# Patient Record
Sex: FEMALE | Race: WHITE | NOT HISPANIC OR LATINO | Employment: UNEMPLOYED | URBAN - METROPOLITAN AREA
[De-identification: names, ages, dates, MRNs, and addresses within clinical notes are randomized per-mention and may not be internally consistent; named-entity substitution may affect disease eponyms.]

---

## 2018-03-21 ENCOUNTER — OFFICE VISIT (OUTPATIENT)
Dept: URGENT CARE | Facility: CLINIC | Age: 28
End: 2018-03-21

## 2018-03-21 ENCOUNTER — HOSPITAL ENCOUNTER (OUTPATIENT)
Facility: MEDICAL CENTER | Age: 28
End: 2018-03-21
Attending: PHYSICIAN ASSISTANT

## 2018-03-21 VITALS
DIASTOLIC BLOOD PRESSURE: 62 MMHG | HEIGHT: 65 IN | TEMPERATURE: 97.3 F | RESPIRATION RATE: 12 BRPM | OXYGEN SATURATION: 97 % | BODY MASS INDEX: 20.68 KG/M2 | HEART RATE: 64 BPM | WEIGHT: 124.12 LBS | SYSTOLIC BLOOD PRESSURE: 110 MMHG

## 2018-03-21 DIAGNOSIS — N30.01 ACUTE CYSTITIS WITH HEMATURIA: ICD-10-CM

## 2018-03-21 LAB
APPEARANCE UR: CLEAR
BILIRUB UR STRIP-MCNC: NEGATIVE MG/DL
COLOR UR AUTO: NORMAL
GLUCOSE UR STRIP.AUTO-MCNC: NEGATIVE MG/DL
KETONES UR STRIP.AUTO-MCNC: NEGATIVE MG/DL
LEUKOCYTE ESTERASE UR QL STRIP.AUTO: NEGATIVE
NITRITE UR QL STRIP.AUTO: NEGATIVE
PH UR STRIP.AUTO: 8 [PH] (ref 5–8)
PROT UR QL STRIP: NEGATIVE MG/DL
RBC UR QL AUTO: NORMAL
SP GR UR STRIP.AUTO: 1
UROBILINOGEN UR STRIP-MCNC: 0.2 MG/DL

## 2018-03-21 PROCEDURE — 99000 SPECIMEN HANDLING OFFICE-LAB: CPT | Performed by: PHYSICIAN ASSISTANT

## 2018-03-21 PROCEDURE — 81002 URINALYSIS NONAUTO W/O SCOPE: CPT | Performed by: PHYSICIAN ASSISTANT

## 2018-03-21 PROCEDURE — 87086 URINE CULTURE/COLONY COUNT: CPT

## 2018-03-21 PROCEDURE — 99202 OFFICE O/P NEW SF 15 MIN: CPT | Performed by: PHYSICIAN ASSISTANT

## 2018-03-21 RX ORDER — NITROFURANTOIN 25; 75 MG/1; MG/1
100 CAPSULE ORAL EVERY 12 HOURS
Qty: 14 CAP | Refills: 0 | Status: SHIPPED | OUTPATIENT
Start: 2018-03-21 | End: 2018-03-28

## 2018-03-21 ASSESSMENT — ENCOUNTER SYMPTOMS
ABDOMINAL PAIN: 1
MUSCULOSKELETAL NEGATIVE: 1
FLANK PAIN: 0
CONSTITUTIONAL NEGATIVE: 1

## 2018-03-22 DIAGNOSIS — N30.01 ACUTE CYSTITIS WITH HEMATURIA: ICD-10-CM

## 2018-03-22 NOTE — PROGRESS NOTES
"Subjective:      Mary Carmen Head is a 27 y.o. female who presents with Dysuria (x1week, frequent urination, difficult to urinate, burns to urinate, does not want cipro)            Dysuria    This is a new problem. The current episode started yesterday. The problem occurs every urination. The problem has been unchanged. The quality of the pain is described as burning. The pain is moderate. There has been no fever. Associated symptoms include frequency and urgency. Pertinent negatives include no discharge, flank pain or hematuria. She has tried nothing for the symptoms. The treatment provided no relief. There is no history of catheterization, kidney stones, recurrent UTIs, a single kidney, urinary stasis or a urological procedure.       Review of Systems   Constitutional: Negative.    Gastrointestinal: Positive for abdominal pain.   Genitourinary: Positive for dysuria, frequency and urgency. Negative for flank pain and hematuria.   Musculoskeletal: Negative.    Skin: Negative.           Objective:     /62   Pulse 64   Temp 36.3 °C (97.3 °F)   Resp 12   Ht 1.651 m (5' 5\")   Wt 56.3 kg (124 lb 1.9 oz)   SpO2 97%   BMI 20.65 kg/m²      Physical Exam   Constitutional: She is oriented to person, place, and time. She appears well-developed and well-nourished. No distress.   Abdominal: She exhibits no distension. There is no tenderness.    No flank or CVAT             Neurological: She is alert and oriented to person, place, and time.   Skin: Skin is warm and dry.   Psychiatric: She has a normal mood and affect. Her behavior is normal.   Nursing note and vitals reviewed.    Active Ambulatory Problems     Diagnosis Date Noted   • No Active Ambulatory Problems     Resolved Ambulatory Problems     Diagnosis Date Noted   • No Resolved Ambulatory Problems     No Additional Past Medical History     Current Outpatient Prescriptions on File Prior to Visit   Medication Sig Dispense Refill   • " promethazine-codeine (PHENERGAN-CODEINE) 6.25-10 MG/5ML SYRP Take 5-10 mL by mouth 4 times a day as needed for Cough. 150 mL 0     No current facility-administered medications on file prior to visit.      Social History     Social History   • Marital status: Single     Spouse name: N/A   • Number of children: N/A   • Years of education: N/A     Occupational History   • Not on file.     Social History Main Topics   • Smoking status: Current Every Day Smoker   • Smokeless tobacco: Never Used   • Alcohol use Not on file   • Drug use: Yes     Types: Marijuana   • Sexual activity: Not on file     Other Topics Concern   • Not on file     Social History Narrative   • No narrative on file     History reviewed. No pertinent family history.  Claritin         ua ng     Assessment/Plan:     ·  uti [consider IC?]      · rx abx, cx

## 2018-03-25 ENCOUNTER — TELEPHONE (OUTPATIENT)
Dept: URGENT CARE | Facility: CLINIC | Age: 28
End: 2018-03-25

## 2018-03-25 LAB
BACTERIA UR CULT: NORMAL
SIGNIFICANT IND 70042: NORMAL
SITE SITE: NORMAL
SOURCE SOURCE: NORMAL

## 2018-04-03 ENCOUNTER — TELEPHONE (OUTPATIENT)
Dept: URGENT CARE | Facility: CLINIC | Age: 28
End: 2018-04-03

## 2018-04-03 NOTE — TELEPHONE ENCOUNTER
1. Caller Name: Mireille                                         Call Back Number: [phone] (home)       Patient approves a detailed voicemail message: yes    2. Patient is requesting lab results dated: 3/25/18.  Per pt, states she left a couple of messages and never received a call back from Tj.    3. Confirmed results are in chart. Patient advised they will be contacted once interpreted by provider.

## 2018-04-04 ENCOUNTER — TELEPHONE (OUTPATIENT)
Dept: URGENT CARE | Facility: CLINIC | Age: 28
End: 2018-04-04

## 2018-04-04 NOTE — TELEPHONE ENCOUNTER
Called pt back [this time did not get 'not in service' ans.but left msg of UA results ; told to call back if still having sx or follow up PCP. rw

## 2018-04-04 NOTE — TELEPHONE ENCOUNTER
Talked to pt; still having urinary urg [but almost sounds more like polyuria?; recommend PCP f/u [can r/o diab.vs ADH /diab.insip.sx?]; rw

## 2018-05-23 ENCOUNTER — HOSPITAL ENCOUNTER (OUTPATIENT)
Dept: LAB | Facility: MEDICAL CENTER | Age: 28
End: 2018-05-23
Attending: NURSE PRACTITIONER
Payer: COMMERCIAL

## 2018-05-23 ENCOUNTER — HOSPITAL ENCOUNTER (OUTPATIENT)
Facility: MEDICAL CENTER | Age: 28
End: 2018-05-23
Attending: NURSE PRACTITIONER
Payer: COMMERCIAL

## 2018-05-23 ENCOUNTER — OFFICE VISIT (OUTPATIENT)
Dept: MEDICAL GROUP | Facility: MEDICAL CENTER | Age: 28
End: 2018-05-23
Payer: COMMERCIAL

## 2018-05-23 VITALS
DIASTOLIC BLOOD PRESSURE: 70 MMHG | HEIGHT: 65 IN | BODY MASS INDEX: 20.66 KG/M2 | OXYGEN SATURATION: 95 % | TEMPERATURE: 98 F | WEIGHT: 124 LBS | RESPIRATION RATE: 16 BRPM | HEART RATE: 68 BPM | SYSTOLIC BLOOD PRESSURE: 122 MMHG

## 2018-05-23 DIAGNOSIS — Z00.00 ANNUAL PHYSICAL EXAM: ICD-10-CM

## 2018-05-23 DIAGNOSIS — R35.89 POLYURIA: ICD-10-CM

## 2018-05-23 DIAGNOSIS — R30.0 DYSURIA: ICD-10-CM

## 2018-05-23 DIAGNOSIS — N30.01 ACUTE CYSTITIS WITH HEMATURIA: ICD-10-CM

## 2018-05-23 DIAGNOSIS — K02.9 TOOTH DECAY: ICD-10-CM

## 2018-05-23 DIAGNOSIS — R63.1 POLYDIPSIA: ICD-10-CM

## 2018-05-23 DIAGNOSIS — H04.123 BILATERAL DRY EYES: ICD-10-CM

## 2018-05-23 PROBLEM — R68.2 DRY MOUTH: Status: ACTIVE | Noted: 2018-05-23

## 2018-05-23 LAB
25(OH)D3 SERPL-MCNC: 26 NG/ML (ref 30–100)
ALBUMIN SERPL BCP-MCNC: 4.6 G/DL (ref 3.2–4.9)
ALBUMIN/GLOB SERPL: 1.4 G/DL
ALP SERPL-CCNC: 44 U/L (ref 30–99)
ALT SERPL-CCNC: 14 U/L (ref 2–50)
ANION GAP SERPL CALC-SCNC: 7 MMOL/L (ref 0–11.9)
APPEARANCE UR: NORMAL
AST SERPL-CCNC: 15 U/L (ref 12–45)
BASOPHILS # BLD AUTO: 1.6 % (ref 0–1.8)
BASOPHILS # BLD: 0.09 K/UL (ref 0–0.12)
BILIRUB SERPL-MCNC: 0.7 MG/DL (ref 0.1–1.5)
BILIRUB UR STRIP-MCNC: NEGATIVE MG/DL
BUN SERPL-MCNC: 6 MG/DL (ref 8–22)
CALCIUM SERPL-MCNC: 9.6 MG/DL (ref 8.5–10.5)
CHLORIDE SERPL-SCNC: 103 MMOL/L (ref 96–112)
CHOLEST SERPL-MCNC: 130 MG/DL (ref 100–199)
CO2 SERPL-SCNC: 30 MMOL/L (ref 20–33)
COLOR UR AUTO: NORMAL
CREAT SERPL-MCNC: 0.61 MG/DL (ref 0.5–1.4)
CREAT UR-MCNC: 22 MG/DL
EOSINOPHIL # BLD AUTO: 0.19 K/UL (ref 0–0.51)
EOSINOPHIL NFR BLD: 3.3 % (ref 0–6.9)
ERYTHROCYTE [DISTWIDTH] IN BLOOD BY AUTOMATED COUNT: 38.6 FL (ref 35.9–50)
GLOBULIN SER CALC-MCNC: 3.2 G/DL (ref 1.9–3.5)
GLUCOSE BLD-MCNC: 86 MG/DL (ref 70–100)
GLUCOSE SERPL-MCNC: 84 MG/DL (ref 65–99)
GLUCOSE UR STRIP.AUTO-MCNC: NEGATIVE MG/DL
HBA1C MFR BLD: 5.2 % (ref ?–5.8)
HCT VFR BLD AUTO: 41.4 % (ref 37–47)
HDLC SERPL-MCNC: 58 MG/DL
HGB BLD-MCNC: 14.2 G/DL (ref 12–16)
IMM GRANULOCYTES # BLD AUTO: 0.01 K/UL (ref 0–0.11)
IMM GRANULOCYTES NFR BLD AUTO: 0.2 % (ref 0–0.9)
INT CON NEG: NEGATIVE
INT CON POS: POSITIVE
KETONES UR STRIP.AUTO-MCNC: NEGATIVE MG/DL
LDLC SERPL CALC-MCNC: 61 MG/DL
LEUKOCYTE ESTERASE UR QL STRIP.AUTO: NORMAL
LYMPHOCYTES # BLD AUTO: 1.95 K/UL (ref 1–4.8)
LYMPHOCYTES NFR BLD: 33.7 % (ref 22–41)
MCH RBC QN AUTO: 30.5 PG (ref 27–33)
MCHC RBC AUTO-ENTMCNC: 34.3 G/DL (ref 33.6–35)
MCV RBC AUTO: 89 FL (ref 81.4–97.8)
MICROALBUMIN UR-MCNC: 64.8 MG/DL
MICROALBUMIN/CREAT UR: 2945 MG/G (ref 0–30)
MONOCYTES # BLD AUTO: 0.36 K/UL (ref 0–0.85)
MONOCYTES NFR BLD AUTO: 6.2 % (ref 0–13.4)
NEUTROPHILS # BLD AUTO: 3.18 K/UL (ref 2–7.15)
NEUTROPHILS NFR BLD: 55 % (ref 44–72)
NITRITE UR QL STRIP.AUTO: POSITIVE
NRBC # BLD AUTO: 0 K/UL
NRBC BLD-RTO: 0 /100 WBC
OSMOLALITY UR: 61 MOSM/KG H2O (ref 300–900)
PH UR STRIP.AUTO: 7 [PH] (ref 5–8)
PLATELET # BLD AUTO: 226 K/UL (ref 164–446)
PMV BLD AUTO: 10.5 FL (ref 9–12.9)
POTASSIUM SERPL-SCNC: 3.6 MMOL/L (ref 3.6–5.5)
PROT SERPL-MCNC: 7.8 G/DL (ref 6–8.2)
PROT UR QL STRIP: 100 MG/DL
RBC # BLD AUTO: 4.65 M/UL (ref 4.2–5.4)
RBC UR QL AUTO: NORMAL
SODIUM SERPL-SCNC: 140 MMOL/L (ref 135–145)
SP GR UR STRIP.AUTO: 1
TRIGL SERPL-MCNC: 56 MG/DL (ref 0–149)
TSH SERPL DL<=0.005 MIU/L-ACNC: 2.08 UIU/ML (ref 0.38–5.33)
UROBILINOGEN UR STRIP-MCNC: NEGATIVE MG/DL
WBC # BLD AUTO: 5.8 K/UL (ref 4.8–10.8)

## 2018-05-23 PROCEDURE — 36415 COLL VENOUS BLD VENIPUNCTURE: CPT

## 2018-05-23 PROCEDURE — 83036 HEMOGLOBIN GLYCOSYLATED A1C: CPT | Performed by: NURSE PRACTITIONER

## 2018-05-23 PROCEDURE — 83935 ASSAY OF URINE OSMOLALITY: CPT

## 2018-05-23 PROCEDURE — 99214 OFFICE O/P EST MOD 30 MIN: CPT | Performed by: NURSE PRACTITIONER

## 2018-05-23 PROCEDURE — 85025 COMPLETE CBC W/AUTO DIFF WBC: CPT

## 2018-05-23 PROCEDURE — 82043 UR ALBUMIN QUANTITATIVE: CPT

## 2018-05-23 PROCEDURE — 84443 ASSAY THYROID STIM HORMONE: CPT

## 2018-05-23 PROCEDURE — 87086 URINE CULTURE/COLONY COUNT: CPT

## 2018-05-23 PROCEDURE — 87086 URINE CULTURE/COLONY COUNT: CPT | Mod: 91

## 2018-05-23 PROCEDURE — 81002 URINALYSIS NONAUTO W/O SCOPE: CPT | Performed by: NURSE PRACTITIONER

## 2018-05-23 PROCEDURE — 82306 VITAMIN D 25 HYDROXY: CPT

## 2018-05-23 PROCEDURE — 80061 LIPID PANEL: CPT

## 2018-05-23 PROCEDURE — 80053 COMPREHEN METABOLIC PANEL: CPT

## 2018-05-23 PROCEDURE — 82570 ASSAY OF URINE CREATININE: CPT

## 2018-05-23 RX ORDER — SULFAMETHOXAZOLE AND TRIMETHOPRIM 800; 160 MG/1; MG/1
1 TABLET ORAL 2 TIMES DAILY
Qty: 6 TAB | Refills: 0 | Status: SHIPPED | OUTPATIENT
Start: 2018-05-23 | End: 2018-05-23

## 2018-05-23 RX ORDER — SULFAMETHOXAZOLE AND TRIMETHOPRIM 800; 160 MG/1; MG/1
1 TABLET ORAL 2 TIMES DAILY
Qty: 6 TAB | Refills: 0 | Status: SHIPPED | OUTPATIENT
Start: 2018-05-23 | End: 2018-05-26

## 2018-05-23 ASSESSMENT — PATIENT HEALTH QUESTIONNAIRE - PHQ9
5. POOR APPETITE OR OVEREATING: 3 - NEARLY EVERY DAY
CLINICAL INTERPRETATION OF PHQ2 SCORE: 2

## 2018-05-23 NOTE — PROGRESS NOTES
Mary Carmen Head is a 28 y.o. female here to establish care and discuss the following concerns:    HPI:      Polyuria  Doesn't matter if she is drinking moderate amounts of water or no water, she is having to urinate frequently all day.     Tooth decay  Due to dry mouth, diagnosed by dentist.  Patient not using mouth moisturizers, patient using fluids to moisten mouth.    Polydipsia  Patient complains of significant dry mouth as well as dry eyes.  She was told by her dentist that she has rapid and persistent tooth decay due to dry mouth.  She has been drinking approximately 64 ounces of water per day, 64 ounces of tea per day, and between 8 and 16 ounces of cranberry juice per day.  She is not currently on any medications.    Bilateral dry eyes  Patient began having eye irritation and what she thought was an eye infection in January.  She was seen for this and prescribed lubricating eyedrops and glasses at [facility].  She states the eyedrops are helping with the dry eye.    Acute cystitis with hematuria  Patient has been having persistent dysuria, foul-smelling urine, urine color changes (green per patient) and lower abdominal pain since March.  Patient was prescribed Macrobid via urgent care, POC urinalysis that day was negative.  Patient was also prescribed ciprofloxacin via [facility] which also did not improve symptoms.  Patient has been drinking excessive amounts of water as  this helps with the dysuria.  If patient is not drinking moderate amounts of water and her bladder discomfort worsens.    Current medicines (including changes today)  Current Outpatient Prescriptions   Medication Sig Dispense Refill   • sulfamethoxazole-trimethoprim (BACTRIM DS) 800-160 MG tablet Take 1 Tab by mouth 2 times a day for 3 days. 6 Tab 0   • CRANBERRY PO Take  by mouth.     • Probiotic Product (PROBIOTIC PO) Take  by mouth.       No current facility-administered medications for this visit.      She  has no past medical  "history on file.  She  has no past surgical history on file.  Social History   Substance Use Topics   • Smoking status: Former Smoker   • Smokeless tobacco: Never Used   • Alcohol use No     Social History     Social History Narrative   • No narrative on file     No family history on file.  No family status information on file.         ROS  No chest pain, no abdominal pain, no rash.  Positive ROS as per HPI.  All other systems reviewed and are negative      Objective:     Blood pressure 122/70, pulse 68, temperature 36.7 °C (98 °F), resp. rate 16, height 1.651 m (5' 5\"), weight 56.2 kg (124 lb), last menstrual period 05/20/2018, SpO2 95 %, not currently breastfeeding. Body mass index is 20.63 kg/m².     Physical Exam:    Constitutional: Alert, no distress.  Skin: Warm, dry, good turgor, no rashes in visible areas.  Eye: Equal, round and reactive, conjunctiva clear, lids normal.  ENMT: Lips without lesions, good dentition, oropharynx clear.  Neck: Trachea midline, no masses, no thyromegaly. No cervical or supraclavicular lymphadenopathy.  Respiratory: Unlabored respiratory effort, lungs clear to auscultation, no wheezes, no ronchi.  Cardiovascular: Normal S1, S2, no murmur, no edema.  Abdomen: Soft, non-tender, no masses, no hepatosplenomegaly. + CVA tenderness  Psych: Alert and oriented x3, normal affect and mood.        Assessment and Plan:   The following treatment plan was discussed    1. Annual physical exam  Check labs, follow for annual.  - CBC WITH DIFFERENTIAL; Future  - COMP METABOLIC PANEL; Future  - MICROALBUMIN CREAT RATIO URINE; Future  - TSH WITH REFLEX TO FT4; Future  - VITAMIN D,25 HYDROXY; Future  - LIPID PROFILE; Future    2. Polyuria  Unstable.  Likely due to UTI and excessive liquid intake.  Check labs to rule out diabetes insipidus or kidney dysfunction.  - MICROALBUMIN CREAT RATIO URINE; Future  - VASOPRESSIN(ADH); Future  - OSMOLALITY URINE; Future    3. Polydipsia  Unstable.  POC glucose was " 86.  A1c 5.2.  Check labs to rule out diabetes insipidus or kidney dysfunction.  - MICROALBUMIN CREAT RATIO URINE; Future  - VASOPRESSIN(ADH); Future  - OSMOLALITY URINE; Future  - POCT Glucose  - POCT Hemoglobin A1C    4. Dysuria  Unstable.  Urine positive for large leuks and positive for nitrates.  Also positive for blood the patient is currently on her period.  Urine sent for culture.  Patient not responsive to Macrobid or ciprofloxacin.  Begin Bactrim -160 mg twice daily for 3 days.  - URINE CULTURE(NEW); Future  - sulfamethoxazole-trimethoprim (BACTRIM DS) 800-160 MG tablet; Take 1 Tab by mouth 2 times a day for 3 days.  Dispense: 6 Tab; Refill: 0    5. Acute cystitis with hematuria  Unstable, as above.  - URINE CULTURE(NEW); Future  - sulfamethoxazole-trimethoprim (BACTRIM DS) 800-160 MG tablet; Take 1 Tab by mouth 2 times a day for 3 days.  Dispense: 6 Tab; Refill: 0    6. Tooth decay  Unstable.  Continue follow-up with dentist.  Evaluated for diabetes insipidus.  We will do further evaluation for causes of dry mouth induced tooth decay at follow-up appointment.    7. Bilateral dry eyes  Unstable.  Continue lubricating eyedrops.  Check labs, follow-up on dry eyes.      Records requested.  Followup: Return in about 3 weeks (around 6/13/2018) for UTI, dry mouth/dry eyes, polyuria/polydipsia, labs.    I have placed the below orders and discussed them with an approved delegating provider. The MA is performing the below orders under the direction of Dr. Villalobos

## 2018-05-23 NOTE — ASSESSMENT & PLAN NOTE
Due to dry mouth, diagnosed by dentist.  Patient not using mouth moisturizers, patient using fluids to moisten mouth.

## 2018-05-23 NOTE — ASSESSMENT & PLAN NOTE
Patient complains of significant dry mouth as well as dry eyes.  She was told by her dentist that she has rapid and persistent tooth decay due to dry mouth.  She has been drinking approximately 64 ounces of water per day, 64 ounces of tea per day, and between 8 and 16 ounces of cranberry juice per day.  She is not currently on any medications.

## 2018-05-23 NOTE — ASSESSMENT & PLAN NOTE
Patient has been having persistent dysuria, foul-smelling urine, urine color changes (green per patient) and lower abdominal pain since March.  Patient was prescribed Macrobid via urgent care, POC urinalysis that day was negative.  Patient was also prescribed ciprofloxacin via [facility] which also did not improve symptoms.  Patient has been drinking excessive amounts of water as  this helps with the dysuria.  If patient is not drinking moderate amounts of water and her bladder discomfort worsens.

## 2018-05-23 NOTE — ASSESSMENT & PLAN NOTE
Patient began having eye irritation and what she thought was an eye infection in January.  She was seen for this and prescribed lubricating eyedrops and glasses at [facility].  She states the eyedrops are helping with the dry eye.

## 2018-05-23 NOTE — ASSESSMENT & PLAN NOTE
Doesn't matter if she is drinking moderate amounts of water or no water, she is having to urinate frequently all day.

## 2018-05-24 ENCOUNTER — HOSPITAL ENCOUNTER (OUTPATIENT)
Dept: LAB | Facility: MEDICAL CENTER | Age: 28
End: 2018-05-24
Attending: NURSE PRACTITIONER
Payer: COMMERCIAL

## 2018-05-24 ENCOUNTER — TELEPHONE (OUTPATIENT)
Dept: MEDICAL GROUP | Facility: MEDICAL CENTER | Age: 28
End: 2018-05-24

## 2018-05-24 DIAGNOSIS — H04.129 DRY EYE: ICD-10-CM

## 2018-05-24 DIAGNOSIS — R35.89 POLYURIA: ICD-10-CM

## 2018-05-24 DIAGNOSIS — R68.2 DRY MOUTH: ICD-10-CM

## 2018-05-24 DIAGNOSIS — R80.9 MICROALBUMINURIA: ICD-10-CM

## 2018-05-24 DIAGNOSIS — R63.1 POLYDIPSIA: ICD-10-CM

## 2018-05-24 LAB
CRP SERPL HS-MCNC: 0.2 MG/L (ref 0–7.5)
ERYTHROCYTE [SEDIMENTATION RATE] IN BLOOD BY WESTERGREN METHOD: 5 MM/HOUR (ref 0–20)
OSMOLALITY SERPL: 289 MOSM/KG H2O (ref 278–298)

## 2018-05-24 PROCEDURE — 86225 DNA ANTIBODY NATIVE: CPT

## 2018-05-24 PROCEDURE — 86141 C-REACTIVE PROTEIN HS: CPT

## 2018-05-24 PROCEDURE — 86235 NUCLEAR ANTIGEN ANTIBODY: CPT | Mod: 91

## 2018-05-24 PROCEDURE — 84588 ASSAY OF VASOPRESSIN: CPT

## 2018-05-24 PROCEDURE — 83930 ASSAY OF BLOOD OSMOLALITY: CPT

## 2018-05-24 PROCEDURE — 85652 RBC SED RATE AUTOMATED: CPT

## 2018-05-24 PROCEDURE — 36415 COLL VENOUS BLD VENIPUNCTURE: CPT

## 2018-05-24 PROCEDURE — 86038 ANTINUCLEAR ANTIBODIES: CPT

## 2018-05-24 NOTE — TELEPHONE ENCOUNTER
Please notify patient that her preliminary lab work came back abnormal and possibly concerning for diabetes insipidus.  I have ordered additional lab work but I would like her to have done as soon as possible.  She can go to any [facility] and they will have the orders in their system.  Also please schedule patient for a follow-up appointment with me as soon as possible in the next few weeks.    ADEBAYO David.

## 2018-05-25 LAB
BACTERIA UR CULT: NORMAL
BACTERIA UR CULT: NORMAL
SIGNIFICANT IND 70042: NORMAL
SIGNIFICANT IND 70042: NORMAL
SITE SITE: NORMAL
SITE SITE: NORMAL
SOURCE SOURCE: NORMAL
SOURCE SOURCE: NORMAL

## 2018-05-26 ENCOUNTER — PATIENT MESSAGE (OUTPATIENT)
Dept: MEDICAL GROUP | Facility: MEDICAL CENTER | Age: 28
End: 2018-05-26

## 2018-05-26 DIAGNOSIS — E23.2 DIABETES INSIPIDUS (HCC): ICD-10-CM

## 2018-05-26 LAB — NUCLEAR IGG SER QL IA: NORMAL

## 2018-05-27 LAB
SSA52 R0ENA AB IGG Q0420: 6 AU/ML (ref 0–40)
SSA60 R0ENA AB IGG Q0419: 0 AU/ML (ref 0–40)

## 2018-05-30 ENCOUNTER — PATIENT MESSAGE (OUTPATIENT)
Dept: MEDICAL GROUP | Facility: MEDICAL CENTER | Age: 28
End: 2018-05-30

## 2018-05-30 DIAGNOSIS — E23.2 DIABETES INSIPIDUS (HCC): ICD-10-CM

## 2018-05-31 ENCOUNTER — HOSPITAL ENCOUNTER (OUTPATIENT)
Dept: LAB | Facility: MEDICAL CENTER | Age: 28
End: 2018-05-31
Attending: FAMILY MEDICINE
Payer: COMMERCIAL

## 2018-05-31 DIAGNOSIS — E23.2 DIABETES INSIPIDUS (HCC): ICD-10-CM

## 2018-05-31 LAB — MISCELLANEOUS LAB RESULT MISCLAB: NORMAL

## 2018-05-31 PROCEDURE — 36415 COLL VENOUS BLD VENIPUNCTURE: CPT

## 2018-05-31 PROCEDURE — 84588 ASSAY OF VASOPRESSIN: CPT

## 2018-06-01 ENCOUNTER — PATIENT MESSAGE (OUTPATIENT)
Dept: MEDICAL GROUP | Facility: MEDICAL CENTER | Age: 28
End: 2018-06-01

## 2018-06-01 DIAGNOSIS — E23.2 DIABETES INSIPIDUS (HCC): ICD-10-CM

## 2018-06-02 ENCOUNTER — HOSPITAL ENCOUNTER (OUTPATIENT)
Facility: MEDICAL CENTER | Age: 28
End: 2018-06-02
Attending: FAMILY MEDICINE
Payer: COMMERCIAL

## 2018-06-02 PROCEDURE — 84156 ASSAY OF PROTEIN URINE: CPT

## 2018-06-02 PROCEDURE — 81050 URINALYSIS VOLUME MEASURE: CPT

## 2018-06-04 ENCOUNTER — HOSPITAL ENCOUNTER (OUTPATIENT)
Dept: LAB | Facility: MEDICAL CENTER | Age: 28
End: 2018-06-04
Attending: FAMILY MEDICINE
Payer: COMMERCIAL

## 2018-06-04 DIAGNOSIS — E23.2 DIABETES INSIPIDUS (HCC): ICD-10-CM

## 2018-06-04 LAB
ANION GAP SERPL CALC-SCNC: 9 MMOL/L (ref 0–11.9)
BUN SERPL-MCNC: 6 MG/DL (ref 8–22)
CALCIUM SERPL-MCNC: 9.6 MG/DL (ref 8.5–10.5)
CHLORIDE SERPL-SCNC: 104 MMOL/L (ref 96–112)
CO2 SERPL-SCNC: 29 MMOL/L (ref 20–33)
CREAT SERPL-MCNC: 0.63 MG/DL (ref 0.5–1.4)
GLUCOSE SERPL-MCNC: 84 MG/DL (ref 65–99)
OSMOLALITY SERPL: 296 MOSM/KG H2O (ref 278–298)
OSMOLALITY UR: 216 MOSM/KG H2O (ref 300–900)
POTASSIUM SERPL-SCNC: 3.4 MMOL/L (ref 3.6–5.5)
PROT 24H UR-MCNC: NORMAL MG/24 HR (ref 30–150)
PROT 24H UR-MRATE: <4 MG/DL (ref 0–15)
SODIUM SERPL-SCNC: 142 MMOL/L (ref 135–145)
SPECIMEN VOL UR: 2900 ML

## 2018-06-04 PROCEDURE — 36415 COLL VENOUS BLD VENIPUNCTURE: CPT

## 2018-06-04 PROCEDURE — 83935 ASSAY OF URINE OSMOLALITY: CPT

## 2018-06-04 PROCEDURE — 80048 BASIC METABOLIC PNL TOTAL CA: CPT

## 2018-06-04 PROCEDURE — 84300 ASSAY OF URINE SODIUM: CPT

## 2018-06-04 PROCEDURE — 83930 ASSAY OF BLOOD OSMOLALITY: CPT

## 2018-06-04 PROCEDURE — 82570 ASSAY OF URINE CREATININE: CPT

## 2018-06-05 LAB
CREAT UR-MCNC: 45.9 MG/DL
MISCELLANEOUS LAB RESULT MISCLAB: NORMAL
SODIUM UR-SCNC: 10 MMOL/L

## 2018-06-08 ENCOUNTER — TELEPHONE (OUTPATIENT)
Dept: MEDICAL GROUP | Facility: MEDICAL CENTER | Age: 28
End: 2018-06-08

## 2018-06-08 DIAGNOSIS — E23.2 DIABETES INSIPIDUS (HCC): ICD-10-CM

## 2018-06-08 NOTE — TELEPHONE ENCOUNTER
Urgent referral placed to endocrinology for new diagnosis of diabetes insipidus.  Collaborating physician, Dr. Villalobos, has spoken with endocrinology, Dr. Rhodes, and agrees that patient will need treatment for this.  Outpatient treatment possible with infusion center.  Message sent to patient regarding this referral, however patient states that her symptoms are worsening and she may not feel that she can wait a few more days for treatment to begin.  Advised patient to go to the emergency room if she feels she cannot wait for her appointment for more urgent treatment.    ADEBAYO David.

## 2018-06-09 ENCOUNTER — HOSPITAL ENCOUNTER (EMERGENCY)
Facility: MEDICAL CENTER | Age: 28
End: 2018-06-09
Attending: EMERGENCY MEDICINE
Payer: COMMERCIAL

## 2018-06-09 VITALS
SYSTOLIC BLOOD PRESSURE: 101 MMHG | HEIGHT: 65 IN | WEIGHT: 125 LBS | DIASTOLIC BLOOD PRESSURE: 66 MMHG | HEART RATE: 61 BPM | OXYGEN SATURATION: 100 % | TEMPERATURE: 98.6 F | RESPIRATION RATE: 18 BRPM | BODY MASS INDEX: 20.83 KG/M2

## 2018-06-09 DIAGNOSIS — R39.15 URINARY URGENCY: ICD-10-CM

## 2018-06-09 DIAGNOSIS — R10.9 ABDOMINAL PAIN, UNSPECIFIED ABDOMINAL LOCATION: ICD-10-CM

## 2018-06-09 LAB
ALBUMIN SERPL BCP-MCNC: 4.5 G/DL (ref 3.2–4.9)
ALBUMIN/GLOB SERPL: 1.7 G/DL
ALP SERPL-CCNC: 46 U/L (ref 30–99)
ALT SERPL-CCNC: 32 U/L (ref 2–50)
ANION GAP SERPL CALC-SCNC: 12 MMOL/L (ref 0–11.9)
APPEARANCE UR: CLEAR
AST SERPL-CCNC: 23 U/L (ref 12–45)
BACTERIA #/AREA URNS HPF: NEGATIVE /HPF
BASOPHILS # BLD AUTO: 1.1 % (ref 0–1.8)
BASOPHILS # BLD: 0.08 K/UL (ref 0–0.12)
BILIRUB SERPL-MCNC: 0.5 MG/DL (ref 0.1–1.5)
BILIRUB UR QL STRIP.AUTO: NEGATIVE
BUN SERPL-MCNC: 8 MG/DL (ref 8–22)
CA-I SERPL-SCNC: 1.1 MMOL/L (ref 1.1–1.3)
CALCIUM SERPL-MCNC: 10 MG/DL (ref 8.5–10.5)
CHLORIDE SERPL-SCNC: 105 MMOL/L (ref 96–112)
CO2 SERPL-SCNC: 26 MMOL/L (ref 20–33)
COLOR UR: YELLOW
CREAT SERPL-MCNC: 0.51 MG/DL (ref 0.5–1.4)
EOSINOPHIL # BLD AUTO: 0.18 K/UL (ref 0–0.51)
EOSINOPHIL NFR BLD: 2.5 % (ref 0–6.9)
EPI CELLS #/AREA URNS HPF: ABNORMAL /HPF
ERYTHROCYTE [DISTWIDTH] IN BLOOD BY AUTOMATED COUNT: 38.8 FL (ref 35.9–50)
GLOBULIN SER CALC-MCNC: 2.7 G/DL (ref 1.9–3.5)
GLUCOSE SERPL-MCNC: 87 MG/DL (ref 65–99)
GLUCOSE UR STRIP.AUTO-MCNC: NEGATIVE MG/DL
HCG SERPL QL: NEGATIVE
HCT VFR BLD AUTO: 39.7 % (ref 37–47)
HGB BLD-MCNC: 13.5 G/DL (ref 12–16)
HYALINE CASTS #/AREA URNS LPF: ABNORMAL /LPF
IMM GRANULOCYTES # BLD AUTO: 0.02 K/UL (ref 0–0.11)
IMM GRANULOCYTES NFR BLD AUTO: 0.3 % (ref 0–0.9)
KETONES UR STRIP.AUTO-MCNC: NEGATIVE MG/DL
LEUKOCYTE ESTERASE UR QL STRIP.AUTO: NEGATIVE
LYMPHOCYTES # BLD AUTO: 1.87 K/UL (ref 1–4.8)
LYMPHOCYTES NFR BLD: 26 % (ref 22–41)
MCH RBC QN AUTO: 30.2 PG (ref 27–33)
MCHC RBC AUTO-ENTMCNC: 34 G/DL (ref 33.6–35)
MCV RBC AUTO: 88.8 FL (ref 81.4–97.8)
MICRO URNS: ABNORMAL
MONOCYTES # BLD AUTO: 0.54 K/UL (ref 0–0.85)
MONOCYTES NFR BLD AUTO: 7.5 % (ref 0–13.4)
NEUTROPHILS # BLD AUTO: 4.51 K/UL (ref 2–7.15)
NEUTROPHILS NFR BLD: 62.6 % (ref 44–72)
NITRITE UR QL STRIP.AUTO: NEGATIVE
NRBC # BLD AUTO: 0 K/UL
NRBC BLD-RTO: 0 /100 WBC
OSMOLALITY SERPL: 294 MOSM/KG H2O (ref 278–298)
OSMOLALITY UR: 220 MOSM/KG H2O (ref 300–900)
PH UR STRIP.AUTO: 6.5 [PH]
PLATELET # BLD AUTO: 218 K/UL (ref 164–446)
PMV BLD AUTO: 10.4 FL (ref 9–12.9)
POTASSIUM SERPL-SCNC: 3.4 MMOL/L (ref 3.6–5.5)
PROT SERPL-MCNC: 7.2 G/DL (ref 6–8.2)
PROT UR QL STRIP: NEGATIVE MG/DL
RBC # BLD AUTO: 4.47 M/UL (ref 4.2–5.4)
RBC # URNS HPF: ABNORMAL /HPF
RBC UR QL AUTO: ABNORMAL
SODIUM SERPL-SCNC: 143 MMOL/L (ref 135–145)
SP GR UR STRIP.AUTO: 1
UROBILINOGEN UR STRIP.AUTO-MCNC: 0.2 MG/DL
WBC # BLD AUTO: 7.2 K/UL (ref 4.8–10.8)
WBC #/AREA URNS HPF: ABNORMAL /HPF

## 2018-06-09 PROCEDURE — 81001 URINALYSIS AUTO W/SCOPE: CPT

## 2018-06-09 PROCEDURE — 83935 ASSAY OF URINE OSMOLALITY: CPT

## 2018-06-09 PROCEDURE — 84703 CHORIONIC GONADOTROPIN ASSAY: CPT

## 2018-06-09 PROCEDURE — 83930 ASSAY OF BLOOD OSMOLALITY: CPT

## 2018-06-09 PROCEDURE — 85025 COMPLETE CBC W/AUTO DIFF WBC: CPT

## 2018-06-09 PROCEDURE — 80053 COMPREHEN METABOLIC PANEL: CPT

## 2018-06-09 PROCEDURE — 99284 EMERGENCY DEPT VISIT MOD MDM: CPT

## 2018-06-09 PROCEDURE — 82330 ASSAY OF CALCIUM: CPT

## 2018-06-09 RX ORDER — VITS A,C,E/LUTEIN/MINERALS 300MCG-200
1 TABLET ORAL DAILY
Status: SHIPPED | COMMUNITY
End: 2023-08-01

## 2018-06-09 RX ORDER — ASCORBIC ACID 500 MG
500 TABLET ORAL DAILY
COMMUNITY

## 2018-06-09 ASSESSMENT — PAIN SCALES - GENERAL: PAINLEVEL_OUTOF10: 4

## 2018-06-09 ASSESSMENT — LIFESTYLE VARIABLES: DO YOU DRINK ALCOHOL: NO

## 2018-06-10 NOTE — ED NOTES
Med rec complete per pt at bedside   Allergies reviewed  No prescription medications currently  Pt was prescribed a 3 day course of Bactrim on 5/23/18, took 3 doses then instructed to stop by MD because she did not have a UTI

## 2018-06-10 NOTE — ED TRIAGE NOTES
"Mary Carmen Arizmendi Sonido  28 y.o.  /66   Pulse 62   Temp 37 °C (98.6 °F) (Temporal)   Resp 16   Ht 1.651 m (5' 5\")   Wt 56.7 kg (125 lb)   LMP 05/20/2018   SpO2 98%   BMI 20.80 kg/m²   Chief Complaint   Patient presents with   • Pain     pt complains of all over pain, pt states she has had >20lbs weight loss since December due to anorexia and food intolerances.     Pt needs to set an apt with an endocrinologist, she is closely followed by Nieves VILA, complains of either extremely diluted urine or concentrated urine, pt admits to drinking large amounts of water every day. VSS, no visible distress. Pt safe to be returned to lobby, educated on triage process and wait times, instructed to notify any staff of worsening/changing of symptoms.     "

## 2018-06-10 NOTE — ED NOTES
Patient given discharge teaching and education. Verbalizes understanding. Given chance to ask questions, all questions answered. Educated patient of signs and symptoms to returned to ER, and educated patient they can return for any concerning symptoms. Education given to patient about medication. Patient states they have their belongings. Denies additional needs at this time. Reports pain is well controlled. Patient monitored every hour and PRN for safety and comfort. Patient ambulatory out of department.

## 2018-06-10 NOTE — ED PROVIDER NOTES
"ED Provider Note    Scribed for Demetrius Mi M.D. by Bartolome Chris. 6/9/2018, 6:57 PM.    Primary care provider: JULITO Santos  Means of arrival: Walk-in  History obtained from: Patient  History limited by: None    CHIEF COMPLAINT  Chief Complaint   Patient presents with   • Pain     pt complains of all over pain, pt states she has had >20lbs weight loss since December due to anorexia and food intolerances.       RONI Head is a 28 y.o. female who presents to the Emergency Department with complaints of worsening groin pain onset a few weeks ago. The patient explains that sometime in December she went to the hospital as she was having flu-like symptoms and left flank pain. Immediately after she left the hospital she began having worsening left flank pain. She notes that her lab work-up in the ED did not indicate any abnormal changes. The patient has not had any imaging done for her pain though. She explains that since the onset she has been evaluated a few different times at multiple Urgent Cares and each Urgent Care only wanted to give her antibiotics as they thought she might have a bladder infection, but she admits that her pain is worse with antibiotics.  In March she was seen by another physician who thought she might have centralized diabetes insipidus. The patient was referred to Nieves Morales (JULITO) and she was given an urgent referral for endocrinology. However, she notes that she has not been able to wait until her appointment with an endocrinologist as her pain has been worsening. She explains that this pain began in her left flank but it has since radiated primarily to her groin region and seems to be radiating to the right side of her body. She also has been experiencing associated nausea which she has not experienced in the past. She describes this pain as a \"stabbing\" pain. Her last menstrual period was 1-2 weeks ago. Patient reports that she has only been able to eat " "lettuce as of recently as well and explains that any other food such as an apple will cause \"shooting\" pains throughout her body. She has not experienced dysuria, but does note that she has had increased urgency, but smaller amounts of urine output. Patient denies any trauma, pregnancy or other possible events that could have caused damage to her internal abdominal organs since December. Patient denies any other history of medical problems. Patient admits that heat on her left side makes her pain better, but heat on her right side exacerbates her pain.     REVIEW OF SYSTEMS  See HPI for further details. All other systems are negative.     C.     PAST MEDICAL HISTORY    No history pertinent to complaint.    SURGICAL HISTORY  patient denies any surgical history    SOCIAL HISTORY  Social History   Substance Use Topics   • Smoking status: Former Smoker   • Smokeless tobacco: Never Used   • Alcohol use No      History   Drug Use No       FAMILY HISTORY  No family history noted    CURRENT MEDICATIONS  Reviewed.  See Encounter Summary.     ALLERGIES  Allergies   Allergen Reactions   • Claritin      Heart palpitations        PHYSICAL EXAM  VITAL SIGNS: /66   Pulse 62   Temp 37 °C (98.6 °F) (Temporal)   Resp 16   Ht 1.651 m (5' 5\")   Wt 56.7 kg (125 lb)   LMP 05/20/2018   SpO2 98%   BMI 20.80 kg/m²   Constitutional: Alert in no apparent distress.  HENT: No signs of trauma, Bilateral external ears normal, Nose normal.   Eyes: Pupils are equal and reactive, Conjunctiva normal, Non-icteric.   Neck: Normal range of motion, No tenderness, Supple, No stridor.   Lymphatic: No lymphadenopathy noted.   Cardiovascular: Regular rate and rhythm, no murmurs.   Thorax & Lungs: Normal breath sounds, No respiratory distress, No wheezing, No chest tenderness.   Abdomen: Bowel sounds normal, Soft, No tenderness, No masses, No pulsatile masses. No peritoneal signs.  Skin: Warm, Dry, No erythema, No rash. Multiple well healed " linear cut scars on bilateral thighs, right greater than left  Back: No bony tenderness, No CVA tenderness.   Extremities: Intact distal pulses, No edema, No tenderness, No cyanosis  Musculoskeletal: Good range of motion in all major joints. No tenderness to palpation or major deformities noted.   Neurologic: Alert , Normal motor function, Normal sensory function, No focal deficits noted.   Psychiatric: Affect normal, Judgment normal, Mood normal.     DIAGNOSTIC STUDIES / PROCEDURES     LABS  Results for orders placed or performed during the hospital encounter of 06/09/18   CBC WITH DIFFERENTIAL   Result Value Ref Range    WBC 7.2 4.8 - 10.8 K/uL    RBC 4.47 4.20 - 5.40 M/uL    Hemoglobin 13.5 12.0 - 16.0 g/dL    Hematocrit 39.7 37.0 - 47.0 %    MCV 88.8 81.4 - 97.8 fL    MCH 30.2 27.0 - 33.0 pg    MCHC 34.0 33.6 - 35.0 g/dL    RDW 38.8 35.9 - 50.0 fL    Platelet Count 218 164 - 446 K/uL    MPV 10.4 9.0 - 12.9 fL    Neutrophils-Polys 62.60 44.00 - 72.00 %    Lymphocytes 26.00 22.00 - 41.00 %    Monocytes 7.50 0.00 - 13.40 %    Eosinophils 2.50 0.00 - 6.90 %    Basophils 1.10 0.00 - 1.80 %    Immature Granulocytes 0.30 0.00 - 0.90 %    Nucleated RBC 0.00 /100 WBC    Neutrophils (Absolute) 4.51 2.00 - 7.15 K/uL    Lymphs (Absolute) 1.87 1.00 - 4.80 K/uL    Monos (Absolute) 0.54 0.00 - 0.85 K/uL    Eos (Absolute) 0.18 0.00 - 0.51 K/uL    Baso (Absolute) 0.08 0.00 - 0.12 K/uL    Immature Granulocytes (abs) 0.02 0.00 - 0.11 K/uL    NRBC (Absolute) 0.00 K/uL   COMP METABOLIC PANEL   Result Value Ref Range    Sodium 143 135 - 145 mmol/L    Potassium 3.4 (L) 3.6 - 5.5 mmol/L    Chloride 105 96 - 112 mmol/L    Co2 26 20 - 33 mmol/L    Anion Gap 12.0 (H) 0.0 - 11.9    Glucose 87 65 - 99 mg/dL    Bun 8 8 - 22 mg/dL    Creatinine 0.51 0.50 - 1.40 mg/dL    Calcium 10.0 8.5 - 10.5 mg/dL    AST(SGOT) 23 12 - 45 U/L    ALT(SGPT) 32 2 - 50 U/L    Alkaline Phosphatase 46 30 - 99 U/L    Total Bilirubin 0.5 0.1 - 1.5 mg/dL    Albumin  4.5 3.2 - 4.9 g/dL    Total Protein 7.2 6.0 - 8.2 g/dL    Globulin 2.7 1.9 - 3.5 g/dL    A-G Ratio 1.7 g/dL   URINALYSIS CULTURE, IF INDICATED   Result Value Ref Range    Color Yellow     Character Clear     Specific Gravity 1.004 <1.035    Ph 6.5 5.0 - 8.0    Glucose Negative Negative mg/dL    Ketones Negative Negative mg/dL    Protein Negative Negative mg/dL    Bilirubin Negative Negative    Urobilinogen, Urine 0.2 Negative    Nitrite Negative Negative    Leukocyte Esterase Negative Negative    Occult Blood Trace (A) Negative    Micro Urine Req Microscopic    HCG Qual Serum   Result Value Ref Range    Beta-Hcg Qualitative Serum Negative Negative   OSMOLALITY URINE   Result Value Ref Range    Osmolality Urine 220 (L) 300 - 900 mOsm/kg H2O   OSMOLALITY SERUM   Result Value Ref Range    Osmolality Serum 294 278 - 298 mOsm/kg H2O   IONIZED CALCIUM   Result Value Ref Range    Ionized Calcium 1.1 1.1 - 1.3 mmol/L   URINE MICROSCOPIC (W/UA)   Result Value Ref Range    WBC 0-2 /hpf    RBC 2-5 (A) /hpf    Bacteria Negative None /hpf    Epithelial Cells Few /hpf    Hyaline Cast 0-2 /lpf   ESTIMATED GFR   Result Value Ref Range    GFR If African American >60 >60 mL/min/1.73 m 2    GFR If Non African American >60 >60 mL/min/1.73 m 2       All labs were reviewed by me.    COURSE & MEDICAL DECISION MAKING  Pertinent Labs & Imaging studies reviewed. (See chart for details)    Differential diagnoses include but are not limited to: DI, UTI, pyelonephritis, pregnancy    6:57 PM - Patient seen and examined at bedside. Ordered osmolality serum, osmolality urine, urinalysis culture, HCG qual serum, CBC with differential, CMP to evaluate her symptoms.     7:50 PM - Ordered estimated GFR, urine microscopic (W/UA), ionized calcium    9:23 PM I discussed the patient's case and the above findings with Internal Medicine who suggests that the patient be sent home and be further evaluated by her endocrinologist as soon as possible.     9:31  PM Patient was reevaluated at bedside. Discussed lab and radiology results with the patient and informed them that there were no abnormal findings. Advised the patient that if she is still experiencing this pain to take Tylenol. Also advised the patient that she needs to follow-up with her endocrinologist as soon as possible for further evaluation and consultation. Did go over the risks and benefits of doing a CT scan, but the patient opted to follow-up with her endocrinologist instead. Patient and patient's mother understands and verbalizes agreement.     Decision Making:  This is a 28 y.o. year old female who presents with multiple vague complaints. Patient's mother at bedside expressed that she has become frustrated with prolonged outpatient workup and scheduling. Patient tonight knows that she has had some migratory discomfort. On physical exam the patient has multiple well-healed cuts the bilateral anterior thighs. This consistent with patient's overall affect with likely underlying psychologic disorder. At this point underlying psychologic etiology of her significant liquid consumption must be considered. Additionally the patient is currently being worked up as an outpatient for possible diabetes insipidus. I do believe this will need to be continued with endocrinology as currently referred. At this time she does not meet any required teary for ongoing inpatient care. She has refused CT as well as pelvic exam which leaves these as possible studies of identification. Patient is understanding return precautions to ER if needed.    The patient will return for new or worsening symptoms and is stable at the time of discharge.    DISPOSITION:  Patient will be discharged home in stable condition.    FOLLOW UP:  Nieves Morales, ARLENE.P.R.N.  [address]  [address]  [facility] [id number] [phone]          [facility], Emergency Dept  [address] [facility] [address] [phone]    If  symptoms worsen      FINAL IMPRESSION  1. Abdominal pain, unspecified abdominal location    2. Urinary urgency          Bartolome ROSE (Scribe), am scribing for, and in the presence of, Demetrius Mi M.D..    Electronically signed by: Bartolome Chris (Scribe), 6/9/2018    Demetrius ROSE M.D. personally performed the services described in this documentation, as scribed by Bartolome Chris in my presence, and it is both accurate and complete.    The note accurately reflects work and decisions made by me.  Demetrius Mi  6/10/2018  2:00 AM

## 2018-06-10 NOTE — DISCHARGE INSTRUCTIONS
Abdominal Pain, Adult  Abdominal pain can be caused by many things. Often, abdominal pain is not serious and it gets better with no treatment or by being treated at home. However, sometimes abdominal pain is serious. Your health care provider will do a medical history and a physical exam to try to determine the cause of your abdominal pain.  Follow these instructions at home:  · Take over-the-counter and prescription medicines only as told by your health care provider. Do not take a laxative unless told by your health care provider.  · Drink enough fluid to keep your urine clear or pale yellow.  · Watch your condition for any changes.  · Keep all follow-up visits as told by your health care provider. This is important.  Contact a health care provider if:  · Your abdominal pain changes or gets worse.  · You are not hungry or you lose weight without trying.  · You are constipated or have diarrhea for more than 2-3 days.  · You have pain when you urinate or have a bowel movement.  · Your abdominal pain wakes you up at night.  · Your pain gets worse with meals, after eating, or with certain foods.  · You are throwing up and cannot keep anything down.  · You have a fever.  Get help right away if:  · Your pain does not go away as soon as your health care provider told you to expect.  · You cannot stop throwing up.  · Your pain is only in areas of the abdomen, such as the right side or the left lower portion of the abdomen.  · You have bloody or black stools, or stools that look like tar.  · You have severe pain, cramping, or bloating in your abdomen.  · You have signs of dehydration, such as:  ¨ Dark urine, very little urine, or no urine.  ¨ Cracked lips.  ¨ Dry mouth.  ¨ Sunken eyes.  ¨ Sleepiness.  ¨ Weakness.  This information is not intended to replace advice given to you by your health care provider. Make sure you discuss any questions you have with your health care provider.  Document Released: 09/27/2006 Document  Revised: 07/07/2017 Document Reviewed: 05/31/2017  [employer] Interactive Patient Education © 2017 [employer]

## 2018-06-13 ENCOUNTER — OFFICE VISIT (OUTPATIENT)
Dept: MEDICAL GROUP | Facility: MEDICAL CENTER | Age: 28
End: 2018-06-13
Payer: COMMERCIAL

## 2018-06-13 VITALS
OXYGEN SATURATION: 96 % | SYSTOLIC BLOOD PRESSURE: 110 MMHG | BODY MASS INDEX: 20.16 KG/M2 | HEART RATE: 53 BPM | HEIGHT: 65 IN | WEIGHT: 121 LBS | TEMPERATURE: 98 F | DIASTOLIC BLOOD PRESSURE: 70 MMHG

## 2018-06-13 DIAGNOSIS — R10.30 LOWER ABDOMINAL PAIN: ICD-10-CM

## 2018-06-13 DIAGNOSIS — E23.2 DIABETES INSIPIDUS (HCC): ICD-10-CM

## 2018-06-13 PROCEDURE — 99214 OFFICE O/P EST MOD 30 MIN: CPT | Performed by: NURSE PRACTITIONER

## 2018-06-13 RX ORDER — TRAMADOL HYDROCHLORIDE 50 MG/1
50 TABLET ORAL EVERY 8 HOURS PRN
Qty: 30 TAB | Refills: 0 | Status: SHIPPED | OUTPATIENT
Start: 2018-06-13 | End: 2018-06-27

## 2018-06-13 NOTE — ASSESSMENT & PLAN NOTE
Patient was seen in the ER per my and Dr. Villalobos recommendation as patient's symptoms had worsened. She was not admitted and was told she does not have diabetes insipidus despite the ongoing mychart discussions and abnormal lab work done. Urgent referral was placed by myself and patient wasn't scheduled until October, patient was then scheduled for July after pleading with the staff. It seemed that they thought that she was being treated for Type 2 diabetes. Patient is having worsening of symptoms. Has severe abdominal pain and bladder pain and difficulty urinating with most foods and drinks.

## 2018-06-13 NOTE — ASSESSMENT & PLAN NOTE
Patient has been having significant pain with new diagnosis of DI. She has severe low abdominal pain with urination and after eating. She has taken this in the past for tooth issues.

## 2018-06-13 NOTE — PROGRESS NOTES
Subjective:   Mary Carmen Head is a 28 y.o. female here today for follow-up on possible diabetes insipidus:    Diabetes insipidus (HCC)  Patient was seen in the ER per my and Dr. Villalobos recommendation as patient's symptoms had worsened. She was not admitted and was told she does not have diabetes insipidus despite the ongoing mychart discussions and abnormal lab work done. Urgent referral was placed by myself and patient wasn't scheduled until October, patient was then scheduled for July after pleading with the staff. It seemed that they thought that she was being treated for Type 2 diabetes. Patient is having worsening of symptoms. Has severe abdominal pain and bladder pain and difficulty urinating with most foods and drinks.     Lower abdominal pain  Patient has been having significant pain with new diagnosis of DI. She has severe low abdominal pain with urination and after eating. She has taken this in the past for tooth issues.        Current medicines (including changes today)  Current Outpatient Prescriptions   Medication Sig Dispense Refill   • tramadol (ULTRAM) 50 MG Tab Take 1 Tab by mouth every 8 hours as needed for Severe Pain for up to 14 days. 30 Tab 0   • ascorbic acid (ASCORBIC ACID) 500 MG Tab Take 500 mg by mouth every day.     • Multiple Vitamins-Minerals (OCUVITE-LUTEIN) Tab Take 1 tablet by mouth every day.     • Multiple Vitamins-Minerals (HAIR/SKIN/NAILS) Tab Take 1 Tab by mouth every day.     • coenzyme Q-10 30 MG capsule Take 60 mg by mouth every day.     • B Complex-Biotin-FA (VITAMIN B50 COMPLEX PO) Take 1 Tab by mouth every day.     • CALCIUM PO Take 1 Tab by mouth every day.     • CRANBERRY PO Take 3 Caps by mouth every day.     • Probiotic Product (PROBIOTIC PO) Take 1 Cap by mouth every day.       No current facility-administered medications for this visit.      She  has no past medical history on file.    ROS   No chest pain, no shortness of breath, no abdominal pain  Positive  "ROS as per HPI.  All other systems reviewed and are negative.     Objective:     Blood pressure 110/70, pulse (!) 53, temperature 36.7 °C (98 °F), height 1.651 m (5' 5\"), weight 54.9 kg (121 lb), last menstrual period 05/21/2018, SpO2 96 %, not currently breastfeeding. Body mass index is 20.14 kg/m².     Physical Exam:  Constitutional: Alert, no distress.  Skin: Warm, dry, good turgor, no rashes in visible areas.  Eye: Equal, round and reactive, conjunctiva clear, lids normal.  ENMT: Lips without lesions, good dentition, oropharynx clear.  Neck: Trachea midline, no masses, no thyromegaly. No cervical or supraclavicular lymphadenopathy  Respiratory: Unlabored respiratory effort, lungs clear to auscultation, no wheezes, no ronchi.  Cardiovascular: Normal S1, S2, no murmur, no edema.  Abdomen: Soft, non-tender, no masses, no hepatosplenomegaly.  Psych: Alert and oriented x3, normal affect and mood.        Assessment and Plan:   The following treatment plan was discussed    1. Diabetes insipidus (HCC)  Unstable.  Urgent referral placed to endocrinology, Dr. Rhodes and Dr. Villalobos have reviewed patient's case together.  Called endocrinology office today to get patient in sooner as it seemed they felt she was being seen for regular diabetes treatment.  This was the case and they had her scheduled incorrectly, patient rescheduled for June 28 for diagnosis and treatment of diabetes insipidus.    2. Lower abdominal pain  Unstable.  Patient has been having significant lower pelvic pain after eating and with urination likely due to inflammation.  I do not want patient taking NSAIDs or aspirin due to the renal effects as patient's microalbumin was significantly elevated recently.  Patient has been trying to take Tylenol however did not notice any improvement in pain and may have had worsening pain with this.  Short course of tramadol 50 mg given until she is able to see endocrinology and start treatment in 2 weeks.  Patient is " unable to drink alcohol due to her current condition, she does not use any other controlled substances or recreational drugs.  Drug screen done in office via mouth swab due to difficulty urinating.  Controlled substance agreement discussed and signed, consent for opiates signed.  - tramadol (ULTRAM) 50 MG Tab; Take 1 Tab by mouth every 8 hours as needed for Severe Pain for up to 14 days.  Dispense: 30 Tab; Refill: 0  - PAIN MANAGEMENT SCRN, UR; Future  - Controlled Substance Treatment Agreement  - CONSENT FOR OPIATE PRESCRIPTION      Followup: Return in about 4 weeks (around 7/11/2018) for Diabetes insipidus.    I have placed the below orders and discussed them with an approved delegating provider. The MA is performing the below orders under the direction of Dr. Villalobos

## 2018-06-28 ENCOUNTER — OFFICE VISIT (OUTPATIENT)
Dept: ENDOCRINOLOGY | Facility: MEDICAL CENTER | Age: 28
End: 2018-06-28
Payer: COMMERCIAL

## 2018-06-28 VITALS
OXYGEN SATURATION: 98 % | WEIGHT: 126 LBS | HEIGHT: 65 IN | HEART RATE: 67 BPM | DIASTOLIC BLOOD PRESSURE: 62 MMHG | BODY MASS INDEX: 20.99 KG/M2 | SYSTOLIC BLOOD PRESSURE: 104 MMHG

## 2018-06-28 DIAGNOSIS — E23.2 DIABETES INSIPIDUS (HCC): ICD-10-CM

## 2018-06-28 DIAGNOSIS — E55.9 VITAMIN D DEFICIENCY: ICD-10-CM

## 2018-06-28 DIAGNOSIS — R53.83 FATIGUE, UNSPECIFIED TYPE: ICD-10-CM

## 2018-06-28 PROCEDURE — 99204 OFFICE O/P NEW MOD 45 MIN: CPT | Performed by: INTERNAL MEDICINE

## 2018-06-28 RX ORDER — ERGOCALCIFEROL 1.25 MG/1
50000 CAPSULE ORAL
Qty: 12 CAP | Refills: 3 | Status: SHIPPED | OUTPATIENT
Start: 2018-06-28 | End: 2018-10-10 | Stop reason: SDUPTHER

## 2018-06-28 RX ORDER — DESMOPRESSIN ACETATE 0.1 MG/1
0.2 TABLET ORAL 2 TIMES DAILY
Qty: 120 TAB | Refills: 3 | Status: SHIPPED | OUTPATIENT
Start: 2018-06-28 | End: 2018-09-17 | Stop reason: SDUPTHER

## 2018-06-28 NOTE — PROGRESS NOTES
New Patient Consult Note  Primary care physician: EMMA David    Reason for consult: Diabetes insipidus    HPI:  Mary Carmen Head is a 28 y.o. old patient who comes in today for evaluation of diabetes insipidus.  She has polyuria and polydipsia since last 8 months progressively getting worse over the period of time.  She has to go to urinate 5-6 times every hour.  She does have to drink a lot of water but not able to keep up with her thirst and increasing frequency of urination.  She is also exhausted and feeling tired.  She also has dry eyes and dry mouth.     ROS:  Constitutional: fatigue, dry mouth, dry eyes, No weight loss  Cardiac: No palpitations or racing heart  Resp: No shortness of breath  Neuro: No numbness or tinging in feet  Endo: No heat or cold intolerance,  polyuria and polydipsia  All other systems were reviewed and were negative.    Past Medical History:  Patient Active Problem List    Diagnosis Date Noted   • Lower abdominal pain 06/13/2018   • Diabetes insipidus (HCC) 06/08/2018   • Polyuria 05/23/2018   • Polydipsia 05/23/2018   • Tooth decay 05/23/2018   • Dry mouth 05/23/2018   • Bilateral dry eyes 05/23/2018   • Dysuria 05/23/2018   • Acute cystitis with hematuria 05/23/2018       Past Surgical History:  History reviewed. No pertinent surgical history.    Allergies:  Claritin    Social History:  Social History     Social History   • Marital status: Single     Spouse name: N/A   • Number of children: N/A   • Years of education: N/A     Occupational History   • Not on file.     Social History Main Topics   • Smoking status: Former Smoker   • Smokeless tobacco: Never Used   • Alcohol use No   • Drug use: No   • Sexual activity: Not Currently     Other Topics Concern   • Not on file     Social History Narrative   • No narrative on file       Family History:  Family History   Problem Relation Age of Onset   • Hypertension Mother    • Thyroid Mother    • Heart Disease  "Maternal Grandmother    • Heart Disease Maternal Grandfather    • Heart Disease Paternal Grandmother    • Scoliosis Paternal Grandmother    • Prostate cancer Paternal Grandfather        Medications:    Current Outpatient Prescriptions:   •  desmopressin (DDAVP) 0.1 MG Tab, Take 2 Tabs by mouth 2 times a day., Disp: 120 Tab, Rfl: 3  •  vitamin D, Ergocalciferol, (DRISDOL) 48097 units Cap capsule, Take 1 Cap by mouth every 7 days., Disp: 12 Cap, Rfl: 3  •  Multiple Vitamins-Minerals (OCUVITE-LUTEIN) Tab, Take 1 tablet by mouth every day., Disp: , Rfl:   •  Multiple Vitamins-Minerals (HAIR/SKIN/NAILS) Tab, Take 1 Tab by mouth every day., Disp: , Rfl:   •  coenzyme Q-10 30 MG capsule, Take 60 mg by mouth every day., Disp: , Rfl:   •  B Complex-Biotin-FA (VITAMIN B50 COMPLEX PO), Take 1 Tab by mouth every day., Disp: , Rfl:   •  CALCIUM PO, Take 1 Tab by mouth every day., Disp: , Rfl:   •  Probiotic Product (PROBIOTIC PO), Take 1 Cap by mouth every day., Disp: , Rfl:   •  ascorbic acid (ASCORBIC ACID) 500 MG Tab, Take 500 mg by mouth every day., Disp: , Rfl:   •  CRANBERRY PO, Take 3 Caps by mouth every day., Disp: , Rfl:     Labs: Reviewed    Physical Examination:  Vital signs: /62   Pulse 67   Ht 1.651 m (5' 5\")   Wt 57.2 kg (126 lb)   SpO2 98%   BMI 20.97 kg/m²  Body mass index is 20.97 kg/m².  General: No apparent distress, cooperative,thin  Eyes: No scleral icterus or discharge  ENMT: Normal on external inspection of nose, lips, normal thyroid exam  Neck: No abnormal masses on inspection  Resp: Normal effort, clear to auscultation bilaterally   CVS: Regular rate and rhythm, S1 S2 normal, no murmur   Extremities: No edema  Abdomen:  No abdominal obesity present  Neuro: Alert and oriented  Skin: No rash  Psych: Normal mood and affect, intact memory and able to make informed decisions    Assessment and Plan:    1. Diabetes insipidus (HCC)  Start   - desmopressin (DDAVP) 0.1 MG Tab; Take 2 Tabs by mouth 2 " times a day.      - MR-BRAIN PITUITARY W/WO contrast to rule out pituitary adenoma or lesion contributing to DI.     2. Vitamin D deficiency  Start   - vitamin D, Ergocalciferol, (DRISDOL) 70803 units Cap capsule; Take 1 Cap by mouth every 7 days.  Dispense: 12 Cap; Refill: 3    3. Fatigue, unspecified type  Could be secondary to increasing polyuria, polydipsia and vitamin D deficiency.    Treating the above underlying condition should help reduce fatigue.    Return in about 2 weeks (around 7/12/2018).     Thank you for allowing me to participate in the care of this patient.    Sandeep Morrell M.D.  06/28/18    CC:   EMMA David    This note was created using voice recognition software (Dragon). The accuracy of the dictation is limited by the abilities of the software. I have reviewed the note prior to signing, however some errors in grammar and context are still possible. If you have any questions related to this note please do not hesitate to contact our office.

## 2018-07-05 ENCOUNTER — HOSPITAL ENCOUNTER (OUTPATIENT)
Dept: LAB | Facility: MEDICAL CENTER | Age: 28
End: 2018-07-05
Attending: NURSE PRACTITIONER
Payer: COMMERCIAL

## 2018-07-05 ENCOUNTER — OFFICE VISIT (OUTPATIENT)
Dept: MEDICAL GROUP | Facility: MEDICAL CENTER | Age: 28
End: 2018-07-05
Payer: COMMERCIAL

## 2018-07-05 DIAGNOSIS — M79.2 NERVE PAIN: ICD-10-CM

## 2018-07-05 DIAGNOSIS — E23.2 DIABETES INSIPIDUS (HCC): ICD-10-CM

## 2018-07-05 DIAGNOSIS — R25.3 MUSCLE TWITCHING: ICD-10-CM

## 2018-07-05 PROBLEM — F32.81 PMDD (PREMENSTRUAL DYSPHORIC DISORDER): Status: ACTIVE | Noted: 2018-07-05

## 2018-07-05 LAB
ANION GAP SERPL CALC-SCNC: 8 MMOL/L (ref 0–11.9)
BUN SERPL-MCNC: 12 MG/DL (ref 8–22)
CALCIUM SERPL-MCNC: 9.5 MG/DL (ref 8.5–10.5)
CHLORIDE SERPL-SCNC: 102 MMOL/L (ref 96–112)
CO2 SERPL-SCNC: 28 MMOL/L (ref 20–33)
CREAT SERPL-MCNC: 0.62 MG/DL (ref 0.5–1.4)
GLUCOSE SERPL-MCNC: 85 MG/DL (ref 65–99)
MAGNESIUM SERPL-MCNC: 2.6 MG/DL (ref 1.5–2.5)
POTASSIUM SERPL-SCNC: 3.9 MMOL/L (ref 3.6–5.5)
SODIUM SERPL-SCNC: 138 MMOL/L (ref 135–145)

## 2018-07-05 PROCEDURE — 83735 ASSAY OF MAGNESIUM: CPT

## 2018-07-05 PROCEDURE — 36415 COLL VENOUS BLD VENIPUNCTURE: CPT

## 2018-07-05 PROCEDURE — 99214 OFFICE O/P EST MOD 30 MIN: CPT | Performed by: NURSE PRACTITIONER

## 2018-07-05 PROCEDURE — 80048 BASIC METABOLIC PNL TOTAL CA: CPT

## 2018-07-06 VITALS
BODY MASS INDEX: 20.66 KG/M2 | SYSTOLIC BLOOD PRESSURE: 112 MMHG | WEIGHT: 124 LBS | HEART RATE: 70 BPM | OXYGEN SATURATION: 95 % | HEIGHT: 65 IN | TEMPERATURE: 97.4 F | DIASTOLIC BLOOD PRESSURE: 72 MMHG

## 2018-07-06 NOTE — PROGRESS NOTES
Subjective:   Mary Carmen Head is a 28 y.o. female here today for follow-up on diabetes insipidus:    Diabetes insipidus (HCC)  Patient has been started on desmopressin by endocrinologist.  She reports her symptoms have significantly improved.  She has an MRI of her brain scheduled in a few weeks and has follow-up with endocrinology next week.  She does state that she has been having increased muscle spasms and numbness and tingling in her arms and legs which has been worse at night since starting the medication.  Her potassium level was previously low prior to starting this medication.  She denies chest pain or heart palpitations.       Current medicines (including changes today)  Current Outpatient Prescriptions   Medication Sig Dispense Refill   • desmopressin (DDAVP) 0.1 MG Tab Take 2 Tabs by mouth 2 times a day. 120 Tab 3   • vitamin D, Ergocalciferol, (DRISDOL) 63477 units Cap capsule Take 1 Cap by mouth every 7 days. 12 Cap 3   • ascorbic acid (ASCORBIC ACID) 500 MG Tab Take 500 mg by mouth every day.     • Multiple Vitamins-Minerals (OCUVITE-LUTEIN) Tab Take 1 tablet by mouth every day.     • Multiple Vitamins-Minerals (HAIR/SKIN/NAILS) Tab Take 1 Tab by mouth every day.     • coenzyme Q-10 30 MG capsule Take 60 mg by mouth every day.     • B Complex-Biotin-FA (VITAMIN B50 COMPLEX PO) Take 1 Tab by mouth every day.     • CALCIUM PO Take 1 Tab by mouth every day.     • CRANBERRY PO Take 3 Caps by mouth every day.     • Probiotic Product (PROBIOTIC PO) Take 1 Cap by mouth every day.       No current facility-administered medications for this visit.      She  has no past medical history on file.    ROS   No chest pain, no shortness of breath, no abdominal pain  Positive ROS as per HPI.  All other systems reviewed and are negative.     Objective:     There were no vitals taken for this visit. There is no height or weight on file to calculate BMI.   Physical Exam:  Constitutional: Alert, no  distress.  Skin: Warm, dry, good turgor, no rashes in visible areas.  Eye: Equal, round and reactive, conjunctiva clear, lids normal.  ENMT: Lips without lesions, good dentition, oropharynx clear.  Neck: Trachea midline, no masses, no thyromegaly. No cervical or supraclavicular lymphadenopathy  Respiratory: Unlabored respiratory effort, lungs clear to auscultation, no wheezes, no ronchi.  Cardiovascular: Normal S1, S2, no murmur, no edema.  Abdomen: Soft, non-tender, no masses, no hepatosplenomegaly.  Psych: Alert and oriented x3, normal affect and mood.        Assessment and Plan:   The following treatment plan was discussed    1. Nerve pain  Unstable.  Check electrolytes.  - BASIC METABOLIC PANEL; Future  - MAGNESIUM; Future    2. Muscle twitching  Unstable.  Check electrolytes.  - BASIC METABOLIC PANEL; Future  - MAGNESIUM; Future    3. Diabetes insipidus (HCC)  Stable.  Continue follow-up with endocrinology.  Continue desmopressin      Followup: Return in about 2 months (around 9/5/2018) for Diabetes insipidus.    I have placed the below orders and discussed them with an approved delegating provider. The MA is performing the below orders under the direction of Dr. Villalobos

## 2018-07-06 NOTE — ASSESSMENT & PLAN NOTE
Patient has been started on desmopressin by endocrinologist.  She reports her symptoms have significantly improved.  She has an MRI of her brain scheduled in a few weeks and has follow-up with endocrinology next week.  She does state that she has been having increased muscle spasms and numbness and tingling in her arms and legs which has been worse at night since starting the medication.  Her potassium level was previously low prior to starting this medication.  She denies chest pain or heart palpitations.

## 2018-07-12 ENCOUNTER — OFFICE VISIT (OUTPATIENT)
Dept: ENDOCRINOLOGY | Facility: MEDICAL CENTER | Age: 28
End: 2018-07-12
Payer: COMMERCIAL

## 2018-07-12 VITALS
DIASTOLIC BLOOD PRESSURE: 50 MMHG | BODY MASS INDEX: 20.83 KG/M2 | WEIGHT: 125 LBS | HEIGHT: 65 IN | HEART RATE: 83 BPM | SYSTOLIC BLOOD PRESSURE: 102 MMHG | OXYGEN SATURATION: 97 %

## 2018-07-12 DIAGNOSIS — E23.2 DIABETES INSIPIDUS (HCC): ICD-10-CM

## 2018-07-12 DIAGNOSIS — E55.9 VITAMIN D DEFICIENCY: ICD-10-CM

## 2018-07-12 PROCEDURE — 99214 OFFICE O/P EST MOD 30 MIN: CPT | Performed by: INTERNAL MEDICINE

## 2018-07-13 ENCOUNTER — TELEPHONE (OUTPATIENT)
Dept: MEDICAL GROUP | Facility: MEDICAL CENTER | Age: 28
End: 2018-07-13

## 2018-07-13 DIAGNOSIS — E23.2 DIABETES INSIPIDUS (HCC): ICD-10-CM

## 2018-07-13 DIAGNOSIS — R10.30 LOWER ABDOMINAL PAIN: ICD-10-CM

## 2018-07-13 DIAGNOSIS — R63.39 FOOD AVERSION: ICD-10-CM

## 2018-07-13 NOTE — TELEPHONE ENCOUNTER
Referral for GI placed per recommendation of endocrinology due to persistent abdominal pain and food aversion not improved with treatment of Diabetes Insipidus.     ADEBAYO David.

## 2018-07-13 NOTE — TELEPHONE ENCOUNTER
----- Message from Mary Carmen Head sent at 7/12/2018  6:55 PM PDT -----  Regarding: RE: RE: Procedure Question  Contact: [phone]  I was hoping the same thing but oh well. Yes, that's the kind he mentioned but I didn't remember it. Thank you :)    ----- Message -----  From: EMMA David  Sent: 7/12/18 3:50 PM  To: Mary Carmen Head  Subject: RE: Procedure Question    Nik Kendrick,    Why don't we have you see a gastroenterologist, a stomach specialist. I was hoping your eating would improve with treatment, but since it hasn't, lets get you in with them. Does that sound ok?    EMMA David      ----- Message -----     From: Mary Carmen Head     Sent: 7/12/2018 12:59 PM PDT       To: EMMA David  Subject: Procedure Question    Hi, just came back from seeing Dr. Morrell. I told him that while the desmopressin is improving my condition, I still can't really eat-not even fruit or bread or really anthing at all-and still have kidney and bladder discomfort.    He said that my kidneys appear fine. That the extreme food intolerance is not associated with diabetes insipitus at all. He recommended that I go through you to book an appointment with a specialist that deals with gastro-related issues and/or a nutrionist.

## 2018-07-17 NOTE — PROGRESS NOTES
Endocrinology Clinic Progress Note  PCP: EMMA David    HPI:  Mary Carmen Head is a 28 y.o. old patient who comes in today for review of endocrine problems.  Diabetes insipidus: Currently on DDAVP 1-1/2 tablets twice daily.  She has noticed reduction in polyuria and polydipsia.  She will be increasing it to 2 tablets twice a day.  She reports pain in bilateral groin and stretching sensation over both kidney area and she relates this to her diabetes insipidus.  She does mention that the stretching sensation or put the kidney area is beginning to subside however her bilateral groin pain is still ongoing.  ROS:  Constitutional: On and off bilateral groin pain ,no unintentional weight loss  Endo: Denies excessive thirst or frequent urination  All other systems were reviewed and were negative.    Past Medical History:  Patient Active Problem List    Diagnosis Date Noted   • Food aversion 07/13/2018   • PMDD (premenstrual dysphoric disorder) 07/05/2018   • Nerve pain 07/05/2018   • Muscle twitching 07/05/2018   • Lower abdominal pain 06/13/2018   • Diabetes insipidus (HCC) 06/08/2018   • Polyuria 05/23/2018   • Polydipsia 05/23/2018   • Tooth decay 05/23/2018   • Dry mouth 05/23/2018   • Bilateral dry eyes 05/23/2018   • Dysuria 05/23/2018   • Acute cystitis with hematuria 05/23/2018       Medications:    Current Outpatient Prescriptions:   •  desmopressin (DDAVP) 0.1 MG Tab, Take 2 Tabs by mouth 2 times a day., Disp: 120 Tab, Rfl: 3  •  vitamin D, Ergocalciferol, (DRISDOL) 20588 units Cap capsule, Take 1 Cap by mouth every 7 days., Disp: 12 Cap, Rfl: 3  •  ascorbic acid (ASCORBIC ACID) 500 MG Tab, Take 500 mg by mouth every day., Disp: , Rfl:   •  Multiple Vitamins-Minerals (OCUVITE-LUTEIN) Tab, Take 1 tablet by mouth every day., Disp: , Rfl:   •  Multiple Vitamins-Minerals (HAIR/SKIN/NAILS) Tab, Take 1 Tab by mouth every day., Disp: , Rfl:   •  coenzyme Q-10 30 MG capsule, Take 60 mg by mouth  "every day., Disp: , Rfl:   •  B Complex-Biotin-FA (VITAMIN B50 COMPLEX PO), Take 1 Tab by mouth every day., Disp: , Rfl:   •  CALCIUM PO, Take 1 Tab by mouth every day., Disp: , Rfl:   •  CRANBERRY PO, Take 3 Caps by mouth every day., Disp: , Rfl:   •  Probiotic Product (PROBIOTIC PO), Take 1 Cap by mouth every day., Disp: , Rfl:     Labs: Reviewed    Physical Examination:  Vital signs: /50   Pulse 83   Ht 1.651 m (5' 5\")   Wt 56.7 kg (125 lb)   LMP 06/23/2018   SpO2 97%   BMI 20.80 kg/m²  Body mass index is 20.8 kg/m².  General: No apparent distress, cooperative  Eyes: No scleral icterus, no discharge, normal eyelids  Neck: No abnormal masses on inspection, normal thyroid exam  Resp: Normal effort, clear to auscultation bilaterally  CVS: Regular rate and rhythm, S1 S2 normal, no murmur  Extremities: No lower extremity edema  Abdomen: abdominal obesity present  Musculoskeletal: Normal digits and nails  Skin: No rash on visible skin  Psych: Alert and oriented, normal mood and affect, intact memory and able to make informed decisions.    Assessment and Plan:    1. Diabetes insipidus (HCC)  Continue DDAVP 0.2 mg twice daily.    2. Vitamin D deficiency  Her vitamin D levels are low and will benefit from the maintenance dose as prescribed.  (50,000 units once a week)    Return in about 3 months (around 10/12/2018).    Total face to face time spent with patient equals 40 minutes. 25/40 minutes were spent on counseling the patient about the pathophysiology of DI, relation of diabetes insipidus to stretching sensation in both kidneys and bilateral groin pain, pituitary-adrenal axis, pituitary-gonadal axis, side effects and benefits of  DDAVP and Vit D and replacement.    Thank you for allowing me to participate in the care of this patient.    Sandeep Morrell M.D.    CC:   ADEBAYO David.    This note was created using voice recognition software (Dragon). The accuracy of the dictation is limited by " the abilities of the software. I have reviewed the note prior to signing, however some errors in grammar and context are still possible. If you have any questions related to this note please do not hesitate to contact our office.

## 2018-07-24 ENCOUNTER — HOSPITAL ENCOUNTER (OUTPATIENT)
Dept: RADIOLOGY | Facility: MEDICAL CENTER | Age: 28
End: 2018-07-24
Attending: INTERNAL MEDICINE
Payer: COMMERCIAL

## 2018-07-24 DIAGNOSIS — E23.2 DIABETES INSIPIDUS (HCC): ICD-10-CM

## 2018-07-24 PROCEDURE — 700117 HCHG RX CONTRAST REV CODE 255: Performed by: INTERNAL MEDICINE

## 2018-07-24 PROCEDURE — A9585 GADOBUTROL INJECTION: HCPCS | Performed by: INTERNAL MEDICINE

## 2018-07-24 PROCEDURE — 70553 MRI BRAIN STEM W/O & W/DYE: CPT

## 2018-07-24 RX ORDER — GADOBUTROL 604.72 MG/ML
10 INJECTION INTRAVENOUS ONCE
Status: COMPLETED | OUTPATIENT
Start: 2018-07-24 | End: 2018-07-24

## 2018-07-24 RX ADMIN — GADOBUTROL 10 ML: 604.72 INJECTION INTRAVENOUS at 15:56

## 2018-08-16 ENCOUNTER — HOSPITAL ENCOUNTER (OUTPATIENT)
Facility: MEDICAL CENTER | Age: 28
End: 2018-08-16
Attending: NURSE PRACTITIONER
Payer: COMMERCIAL

## 2018-08-16 ENCOUNTER — OFFICE VISIT (OUTPATIENT)
Dept: MEDICAL GROUP | Facility: MEDICAL CENTER | Age: 28
End: 2018-08-16
Payer: COMMERCIAL

## 2018-08-16 VITALS
TEMPERATURE: 98.3 F | RESPIRATION RATE: 16 BRPM | DIASTOLIC BLOOD PRESSURE: 70 MMHG | HEART RATE: 80 BPM | HEIGHT: 65 IN | WEIGHT: 125 LBS | SYSTOLIC BLOOD PRESSURE: 110 MMHG | OXYGEN SATURATION: 95 % | BODY MASS INDEX: 20.83 KG/M2

## 2018-08-16 DIAGNOSIS — R80.9 MICROALBUMINURIA: ICD-10-CM

## 2018-08-16 DIAGNOSIS — M54.9 CVA TENDERNESS: ICD-10-CM

## 2018-08-16 DIAGNOSIS — R39.89 BLADDER PAIN: ICD-10-CM

## 2018-08-16 DIAGNOSIS — E23.2 DIABETES INSIPIDUS (HCC): ICD-10-CM

## 2018-08-16 PROCEDURE — 99214 OFFICE O/P EST MOD 30 MIN: CPT | Performed by: NURSE PRACTITIONER

## 2018-08-16 PROCEDURE — 82570 ASSAY OF URINE CREATININE: CPT

## 2018-08-16 PROCEDURE — 82043 UR ALBUMIN QUANTITATIVE: CPT

## 2018-08-16 RX ORDER — NORETHINDRONE ACETATE AND ETHINYL ESTRADIOL AND FERROUS FUMARATE 1MG-20(21)
1 KIT ORAL DAILY
COMMUNITY
Start: 2018-08-09 | End: 2019-04-08

## 2018-08-17 LAB
CREAT UR-MCNC: 134.5 MG/DL
MICROALBUMIN UR-MCNC: 174.8 MG/DL
MICROALBUMIN/CREAT UR: 1300 MG/G (ref 0–30)

## 2018-08-17 NOTE — PROGRESS NOTES
"Subjective:   Mary Carmen Head is a 28 y.o. female here today for follow-up:    Diabetes insipidus (HCC)  Continuing to see endocrinology, continues to take desmopressin twice daily.    Bladder pain  Patient reports she is still having bladder and kidney pain.  Had significantly elevated microalbuminuria in May, 2018.  Was seen by gastroenterology who stated her pain is not GI related.  She has recently seen gynecology as well who is working her up for endometriosis.  She was started on birth control which she will begin at the end of this week after her..  We will see if symptoms improve on this.       Current medicines (including changes today)  Current Outpatient Prescriptions   Medication Sig Dispense Refill   • MICROGESTIN FE 1/20 1-20 MG-MCG per tablet Take 1 Tab by mouth every day.     • desmopressin (DDAVP) 0.1 MG Tab Take 2 Tabs by mouth 2 times a day. 120 Tab 3   • vitamin D, Ergocalciferol, (DRISDOL) 09700 units Cap capsule Take 1 Cap by mouth every 7 days. 12 Cap 3   • ascorbic acid (ASCORBIC ACID) 500 MG Tab Take 500 mg by mouth every day.     • Multiple Vitamins-Minerals (OCUVITE-LUTEIN) Tab Take 1 tablet by mouth every day.     • Multiple Vitamins-Minerals (HAIR/SKIN/NAILS) Tab Take 1 Tab by mouth every day.     • coenzyme Q-10 30 MG capsule Take 60 mg by mouth every day.     • B Complex-Biotin-FA (VITAMIN B50 COMPLEX PO) Take 1 Tab by mouth every day.     • CALCIUM PO Take 1 Tab by mouth every day.     • CRANBERRY PO Take 3 Caps by mouth every day.     • Probiotic Product (PROBIOTIC PO) Take 1 Cap by mouth every day.       No current facility-administered medications for this visit.      She  has no past medical history on file.    ROS   No chest pain, no shortness of breath, no abdominal pain  Positive ROS as per HPI.  All other systems reviewed and are negative.     Objective:     Blood pressure 110/70, pulse 80, temperature 36.8 °C (98.3 °F), resp. rate 16, height 1.651 m (5' 5\"), weight " 56.7 kg (125 lb), SpO2 95 %. Body mass index is 20.8 kg/m².     Physical Exam:  Constitutional: Alert, no distress.  Skin: Warm, dry, good turgor, no rashes in visible areas.  Eye: Equal, round and reactive, conjunctiva clear, lids normal.  ENMT: Lips without lesions, good dentition, oropharynx clear.  Neck: Trachea midline, no masses, no thyromegaly. No cervical or supraclavicular lymphadenopathy  Respiratory: Unlabored respiratory effort, lungs clear to auscultation, no wheezes, no ronchi.  Cardiovascular: Normal S1, S2, no murmur, no edema.  Abdomen: Soft, non-tender, no masses, no hepatosplenomegaly.  Psych: Alert and oriented x3, normal affect and mood.      Assessment and Plan:   The following treatment plan was discussed    1. CVA tenderness  Unstable.  Check renal ultrasound.  Recheck microalbumin which was significantly elevated in May.  - US-RENAL; Future    2. Bladder pain  Unstable.  Check bladder ultrasound.  - US-RENAL; Future    3. Microalbuminuria  Unstable.  Recheck microalbumin  - US-RENAL; Future  - MICROALBUMIN CREAT RATIO URINE; Future    4. Diabetes insipidus (HCC)  Stable.  Continue follow-up with endocrinology.      Followup: Return in about 3 months (around 11/16/2018).    I have placed the below orders and discussed them with an approved delegating provider. The MA is performing the below orders under the direction of Dr. Villalobos

## 2018-08-17 NOTE — ASSESSMENT & PLAN NOTE
Patient reports she is still having bladder and kidney pain.  Had significantly elevated microalbuminuria in May, 2018.  Was seen by gastroenterology who stated her pain is not GI related.  She has recently seen gynecology as well who is working her up for endometriosis.  She was started on birth control which she will begin at the end of this week after her..  We will see if symptoms improve on this.

## 2018-08-22 ENCOUNTER — TELEPHONE (OUTPATIENT)
Dept: MEDICAL GROUP | Facility: MEDICAL CENTER | Age: 28
End: 2018-08-22

## 2018-08-22 DIAGNOSIS — R80.9 MICROALBUMINURIA: ICD-10-CM

## 2018-08-22 DIAGNOSIS — M54.9 CVA TENDERNESS: ICD-10-CM

## 2018-08-22 DIAGNOSIS — E23.2 DIABETES INSIPIDUS (HCC): ICD-10-CM

## 2018-08-22 DIAGNOSIS — R39.89 BLADDER PAIN: ICD-10-CM

## 2018-08-22 NOTE — TELEPHONE ENCOUNTER
Patient referred to nephrology due to persistent microalbuminuria despite treatment for nephrogenic diabetes insipidus. Microalbumin/creatinine ratio remains at 1300, down from 2945.  Patient continues to report bladder and kidney pain/feelings of inflammation in her kidneys.    ADEBAYO David.

## 2018-08-22 NOTE — TELEPHONE ENCOUNTER
----- Message from EMMA David sent at 8/22/2018 11:25 AM PDT -----  Please notify patient that I have placed a referral for nephrology to evaluate her kidneys as her kidneys are still showing that they are under stress on her labs.  Please advised patient to schedule her ultrasound as soon as possible.    EMMA David

## 2018-08-29 ENCOUNTER — HOSPITAL ENCOUNTER (OUTPATIENT)
Dept: RADIOLOGY | Facility: MEDICAL CENTER | Age: 28
End: 2018-08-29
Attending: NURSE PRACTITIONER

## 2018-08-29 DIAGNOSIS — R80.9 MICROALBUMINURIA: ICD-10-CM

## 2018-08-29 DIAGNOSIS — M54.9 CVA TENDERNESS: ICD-10-CM

## 2018-08-29 DIAGNOSIS — R39.89 BLADDER PAIN: ICD-10-CM

## 2018-08-29 PROCEDURE — 76775 US EXAM ABDO BACK WALL LIM: CPT

## 2018-09-04 ENCOUNTER — TELEPHONE (OUTPATIENT)
Dept: MEDICAL GROUP | Facility: MEDICAL CENTER | Age: 28
End: 2018-09-04

## 2018-09-04 NOTE — TELEPHONE ENCOUNTER
----- Message from EMMA David sent at 9/4/2018 11:42 AM PDT -----  Please notify patient that her kidney and bladder ultrasound was unremarkable.  She still needs to follow-up with the nephrologist.     EMMA David

## 2018-09-10 ENCOUNTER — TELEPHONE (OUTPATIENT)
Dept: MEDICAL GROUP | Facility: MEDICAL CENTER | Age: 28
End: 2018-09-10

## 2018-09-10 DIAGNOSIS — H57.13 PAIN OF BOTH EYES: ICD-10-CM

## 2018-09-10 DIAGNOSIS — E23.2 DIABETES INSIPIDUS (HCC): ICD-10-CM

## 2018-09-10 DIAGNOSIS — H53.9 VISION CHANGES: ICD-10-CM

## 2018-09-10 NOTE — TELEPHONE ENCOUNTER
Regarding: Procedure Question  Contact: [phone]  ----- Message from Miguel Mo Ass't sent at 9/7/2018  4:30 PM PDT -----       ----- Message from Mary Carmen Head to EMMA David sent at 9/7/2018  3:50 PM -----   Hi, got the message about the ultrasound results. I couldn't do it properly because she wanted me to fill my bladder for an entire hour and that just isn't possible right now.    I was wondering if you could put in a referral for me with an opthalmologist. I've had terrible eye pain for the past week. Haven't been able to read, write, use my eyes without strain. Following movement is very difficult. Been pretty much keeping them closed most of the week and there's barely any improvement. Not sure if it's related to all the other stuff. When I went to the optometrist at [facility] in Feb/March, he said my eyes were really dry. The eyedrops he told me to use back then only seem to make it worse now.

## 2018-09-17 DIAGNOSIS — E23.2 DIABETES INSIPIDUS (HCC): ICD-10-CM

## 2018-09-17 RX ORDER — DESMOPRESSIN ACETATE 0.1 MG/1
0.2 TABLET ORAL 2 TIMES DAILY
Qty: 120 TAB | Refills: 3 | Status: SHIPPED | OUTPATIENT
Start: 2018-09-17 | End: 2018-10-10 | Stop reason: SDUPTHER

## 2018-10-04 ENCOUNTER — TELEPHONE (OUTPATIENT)
Dept: MEDICAL GROUP | Facility: MEDICAL CENTER | Age: 28
End: 2018-10-04

## 2018-10-04 DIAGNOSIS — R39.89 BLADDER PAIN: ICD-10-CM

## 2018-10-04 DIAGNOSIS — R30.0 DYSURIA: ICD-10-CM

## 2018-10-04 DIAGNOSIS — M54.9 CVA TENDERNESS: ICD-10-CM

## 2018-10-04 DIAGNOSIS — R10.30 LOWER ABDOMINAL PAIN: ICD-10-CM

## 2018-10-04 DIAGNOSIS — R80.9 MICROALBUMINURIA: ICD-10-CM

## 2018-10-04 NOTE — TELEPHONE ENCOUNTER
CT abdomen/pelvis ordered due to persistent low abdominal/pelvic/bladder pain and new report of groin swelling. Will recheck BMP, urinalysis, and urine microalbumin.     ADEBAYO David.

## 2018-10-09 ENCOUNTER — HOSPITAL ENCOUNTER (OUTPATIENT)
Dept: RADIOLOGY | Facility: MEDICAL CENTER | Age: 28
End: 2018-10-09
Attending: NURSE PRACTITIONER
Payer: COMMERCIAL

## 2018-10-09 DIAGNOSIS — R39.89 BLADDER PAIN: ICD-10-CM

## 2018-10-09 DIAGNOSIS — R10.30 LOWER ABDOMINAL PAIN: ICD-10-CM

## 2018-10-09 DIAGNOSIS — M54.9 CVA TENDERNESS: ICD-10-CM

## 2018-10-09 DIAGNOSIS — R80.9 MICROALBUMINURIA: ICD-10-CM

## 2018-10-09 DIAGNOSIS — R30.0 DYSURIA: ICD-10-CM

## 2018-10-09 PROCEDURE — 74176 CT ABD & PELVIS W/O CONTRAST: CPT

## 2018-10-10 ENCOUNTER — OFFICE VISIT (OUTPATIENT)
Dept: ENDOCRINOLOGY | Facility: MEDICAL CENTER | Age: 28
End: 2018-10-10
Payer: COMMERCIAL

## 2018-10-10 VITALS
DIASTOLIC BLOOD PRESSURE: 72 MMHG | OXYGEN SATURATION: 98 % | BODY MASS INDEX: 21.66 KG/M2 | HEIGHT: 65 IN | HEART RATE: 74 BPM | SYSTOLIC BLOOD PRESSURE: 110 MMHG | WEIGHT: 130 LBS | RESPIRATION RATE: 14 BRPM

## 2018-10-10 DIAGNOSIS — R53.83 FATIGUE, UNSPECIFIED TYPE: ICD-10-CM

## 2018-10-10 DIAGNOSIS — E23.2 DIABETES INSIPIDUS (HCC): ICD-10-CM

## 2018-10-10 DIAGNOSIS — R79.89 PATHOLOGIC HIGH SERUM PROLACTIN: ICD-10-CM

## 2018-10-10 DIAGNOSIS — E55.9 VITAMIN D DEFICIENCY: ICD-10-CM

## 2018-10-10 PROCEDURE — 99215 OFFICE O/P EST HI 40 MIN: CPT | Performed by: INTERNAL MEDICINE

## 2018-10-10 RX ORDER — DESMOPRESSIN ACETATE 0.1 MG/1
0.2 TABLET ORAL 2 TIMES DAILY
Qty: 360 TAB | Refills: 3 | Status: SHIPPED | OUTPATIENT
Start: 2018-10-10 | End: 2019-11-04 | Stop reason: SDUPTHER

## 2018-10-10 RX ORDER — ERGOCALCIFEROL 1.25 MG/1
50000 CAPSULE ORAL
Qty: 12 CAP | Refills: 3 | Status: SHIPPED | OUTPATIENT
Start: 2018-10-10 | End: 2019-06-12

## 2018-10-15 NOTE — PROGRESS NOTES
Endocrinology Clinic Progress Note  PCP: EMMA David    HPI:  Mary Carmen Head is a 28 y.o. old patient who comes in today for review of endocrine problems.  Diabetes insipidus: Currently taking 1 tablet of 0.1 mg twice daily.  She states that when she was taking 1.5 tablets twice daily her symptoms of increased urination got worse.    She continues to have nonspecific symptoms including questionable enlargement of the lymph nodes in the perineal and groin area.  In the past she complained of stretching sensation in the kidney area as well.  Ultrasound of the kidney and the CAT scan of the abdomen and pelvis is normal.  She would like to get her endocrine system tested to ensure that nothing else is happening with her.  She also complains of on and off headaches.      ROS:  Constitutional: headaches,No unintentional weight loss  Endo:  excessive thirst or frequent urination  All other systems were reviewed and were negative.    Past Medical History:  Patient Active Problem List    Diagnosis Date Noted   • CVA tenderness 08/16/2018   • Bladder pain 08/16/2018   • Microalbuminuria 08/16/2018   • Food aversion 07/13/2018   • PMDD (premenstrual dysphoric disorder) 07/05/2018   • Nerve pain 07/05/2018   • Muscle twitching 07/05/2018   • Lower abdominal pain 06/13/2018   • Diabetes insipidus (HCC) 06/08/2018   • Polyuria 05/23/2018   • Polydipsia 05/23/2018   • Tooth decay 05/23/2018   • Dry mouth 05/23/2018   • Bilateral dry eyes 05/23/2018   • Dysuria 05/23/2018   • Acute cystitis with hematuria 05/23/2018       Medications:    Current Outpatient Prescriptions:   •  vitamin D, Ergocalciferol, (DRISDOL) 47256 units Cap capsule, Take 1 Cap by mouth every 7 days., Disp: 12 Cap, Rfl: 3  •  desmopressin (DDAVP) 0.1 MG Tab, Take 2 Tabs by mouth 2 times a day., Disp: 360 Tab, Rfl: 3  •  ascorbic acid (ASCORBIC ACID) 500 MG Tab, Take 500 mg by mouth every day., Disp: , Rfl:   •  Multiple  "Vitamins-Minerals (OCUVITE-LUTEIN) Tab, Take 1 tablet by mouth every day., Disp: , Rfl:   •  Multiple Vitamins-Minerals (HAIR/SKIN/NAILS) Tab, Take 1 Tab by mouth every day., Disp: , Rfl:   •  coenzyme Q-10 30 MG capsule, Take 60 mg by mouth every day., Disp: , Rfl:   •  B Complex-Biotin-FA (VITAMIN B50 COMPLEX PO), Take 1 Tab by mouth every day., Disp: , Rfl:   •  CALCIUM PO, Take 1 Tab by mouth every day., Disp: , Rfl:   •  CRANBERRY PO, Take 3 Caps by mouth every day., Disp: , Rfl:   •  Probiotic Product (PROBIOTIC PO), Take 1 Cap by mouth every day., Disp: , Rfl:   •  MICROGESTIN FE 1/20 1-20 MG-MCG per tablet, Take 1 Tab by mouth every day., Disp: , Rfl:     Labs: Reviewed    Physical Examination:  Vital signs: /72 (BP Location: Left arm, Patient Position: Sitting, BP Cuff Size: Adult)   Pulse 74   Resp 14   Ht 1.651 m (5' 5\")   Wt 59 kg (130 lb)   SpO2 98%   BMI 21.63 kg/m²  Body mass index is 21.63 kg/m².  General: No apparent distress, cooperative  Eyes: No scleral icterus, no discharge, normal eyelids  Neck: No abnormal masses on inspection, normal thyroid exam  Resp: Normal effort, clear to auscultation bilaterally  CVS: Regular rate and rhythm, S1 S2 normal, no murmur  Extremities: No lower extremity edema  Abdomen: abdominal obesity present  Musculoskeletal: Normal digits and nails  Skin: No rash on visible skin  Psych: Alert and oriented, normal mood and affect, intact memory and able to make informed decisions.    Assessment and Plan:    1. Vitamin D deficiency  She never got started taking vitamin D as she was waiting for this appointment and wanted questions to be answered.  Explained to the patient that vitamin D deficiency can also sometimes lead to nonspecific symptoms and signs  - vitamin D, Ergocalciferol, (DRISDOL) 60926 units Cap capsule; Take 1 Cap by mouth every 7 days.  Dispense: 12 Cap; Refill: 3    2. Diabetes insipidus (HCC)  increase  - desmopressin (DDAVP) 0.1 MG Tab; Take " 2 Tabs by mouth 2 times a day.  Dispense: 360 Tab; Refill: 3    3. Fatigue, unspecified type  Rule out adrenal insufficiency as the contributory factor for fatigue.  Rule out hypothyroidism as the contributory factor for fatigue.  Rule out Vitamin B12 deficiency as the contributory factor for fatigue.    4. Pathologic high serum prolactin  Rule out hyperprolactinemia as the contributory factor for headaches.   - PROLACTIN; Future    Return in about 2 months (around 12/10/2018).    Total face to face time spent with patient equals 40 minutes. 25/40 minutes were spent on counseling the patient about the pathophysiology of Diabetes insipidus, pituitary-thyroid axis, pituitary-adrenal axis, pituitary-gonadal axis,  side effects and benefits of DDAVP and Vit D  replacement.    Thank you for allowing me to participate in the care of this patient.    Sandeep Morrell M.D.    CC:   ADEBAYO David.    This note was created using voice recognition software (Dragon). The accuracy of the dictation is limited by the abilities of the software. I have reviewed the note prior to signing, however some errors in grammar and context are still possible. If you have any questions related to this note please do not hesitate to contact our office.

## 2018-10-17 ENCOUNTER — HOSPITAL ENCOUNTER (OUTPATIENT)
Dept: LAB | Facility: MEDICAL CENTER | Age: 28
End: 2018-10-17
Attending: INTERNAL MEDICINE
Payer: MEDICAID

## 2018-10-17 ENCOUNTER — HOSPITAL ENCOUNTER (OUTPATIENT)
Dept: LAB | Facility: MEDICAL CENTER | Age: 28
End: 2018-10-17
Attending: NURSE PRACTITIONER
Payer: MEDICAID

## 2018-10-17 DIAGNOSIS — R30.0 DYSURIA: ICD-10-CM

## 2018-10-17 DIAGNOSIS — R79.89 PATHOLOGIC HIGH SERUM PROLACTIN: ICD-10-CM

## 2018-10-17 DIAGNOSIS — R10.30 LOWER ABDOMINAL PAIN: ICD-10-CM

## 2018-10-17 DIAGNOSIS — R53.83 FATIGUE, UNSPECIFIED TYPE: ICD-10-CM

## 2018-10-17 DIAGNOSIS — R39.89 BLADDER PAIN: ICD-10-CM

## 2018-10-17 DIAGNOSIS — R80.9 MICROALBUMINURIA: ICD-10-CM

## 2018-10-17 LAB
ANION GAP SERPL CALC-SCNC: 7 MMOL/L (ref 0–11.9)
APPEARANCE UR: ABNORMAL
BACTERIA #/AREA URNS HPF: ABNORMAL /HPF
BILIRUB UR QL STRIP.AUTO: NEGATIVE
BUN SERPL-MCNC: 12 MG/DL (ref 8–22)
CALCIUM SERPL-MCNC: 10 MG/DL (ref 8.5–10.5)
CAOX CRY #/AREA URNS HPF: ABNORMAL /HPF
CHLORIDE SERPL-SCNC: 101 MMOL/L (ref 96–112)
CO2 SERPL-SCNC: 27 MMOL/L (ref 20–33)
COLOR UR: YELLOW
CORTIS SERPL-MCNC: 10.9 UG/DL (ref 0–23)
CREAT SERPL-MCNC: 0.67 MG/DL (ref 0.5–1.4)
CREAT UR-MCNC: 129.5 MG/DL
EPI CELLS #/AREA URNS HPF: ABNORMAL /HPF
GLUCOSE SERPL-MCNC: 82 MG/DL (ref 65–99)
GLUCOSE UR STRIP.AUTO-MCNC: NEGATIVE MG/DL
HYALINE CASTS #/AREA URNS LPF: ABNORMAL /LPF
KETONES UR STRIP.AUTO-MCNC: ABNORMAL MG/DL
LEUKOCYTE ESTERASE UR QL STRIP.AUTO: ABNORMAL
MICRO URNS: ABNORMAL
MICROALBUMIN UR-MCNC: 1.1 MG/DL
MICROALBUMIN/CREAT UR: 8 MG/G (ref 0–30)
NITRITE UR QL STRIP.AUTO: NEGATIVE
PH UR STRIP.AUTO: 6.5 [PH]
POTASSIUM SERPL-SCNC: 4 MMOL/L (ref 3.6–5.5)
PROLACTIN SERPL-MCNC: 7.99 NG/ML (ref 2.8–26)
PROT UR QL STRIP: NEGATIVE MG/DL
RBC # URNS HPF: ABNORMAL /HPF
RBC UR QL AUTO: NEGATIVE
SODIUM SERPL-SCNC: 135 MMOL/L (ref 135–145)
SP GR UR STRIP.AUTO: 1.02
T3 SERPL-MCNC: 103.5 NG/DL (ref 60–181)
T3FREE SERPL-MCNC: 3.04 PG/ML (ref 2.4–4.2)
T4 FREE SERPL-MCNC: 0.82 NG/DL (ref 0.53–1.43)
THYROPEROXIDASE AB SERPL-ACNC: 11.4 IU/ML (ref 0–9)
TSH SERPL DL<=0.005 MIU/L-ACNC: 6.7 UIU/ML (ref 0.38–5.33)
UROBILINOGEN UR STRIP.AUTO-MCNC: 0.2 MG/DL
VIT B12 SERPL-MCNC: 308 PG/ML (ref 211–911)
WBC #/AREA URNS HPF: ABNORMAL /HPF

## 2018-10-17 PROCEDURE — 82043 UR ALBUMIN QUANTITATIVE: CPT

## 2018-10-17 PROCEDURE — 84480 ASSAY TRIIODOTHYRONINE (T3): CPT

## 2018-10-17 PROCEDURE — 82024 ASSAY OF ACTH: CPT

## 2018-10-17 PROCEDURE — 82533 TOTAL CORTISOL: CPT

## 2018-10-17 PROCEDURE — 86376 MICROSOMAL ANTIBODY EACH: CPT

## 2018-10-17 PROCEDURE — 82570 ASSAY OF URINE CREATININE: CPT

## 2018-10-17 PROCEDURE — 81001 URINALYSIS AUTO W/SCOPE: CPT

## 2018-10-17 PROCEDURE — 84443 ASSAY THYROID STIM HORMONE: CPT

## 2018-10-17 PROCEDURE — 84481 FREE ASSAY (FT-3): CPT

## 2018-10-17 PROCEDURE — 36415 COLL VENOUS BLD VENIPUNCTURE: CPT

## 2018-10-17 PROCEDURE — 84146 ASSAY OF PROLACTIN: CPT

## 2018-10-17 PROCEDURE — 84439 ASSAY OF FREE THYROXINE: CPT

## 2018-10-17 PROCEDURE — 80048 BASIC METABOLIC PNL TOTAL CA: CPT

## 2018-10-17 PROCEDURE — 82607 VITAMIN B-12: CPT

## 2018-10-19 ENCOUNTER — TELEPHONE (OUTPATIENT)
Dept: MEDICAL GROUP | Facility: MEDICAL CENTER | Age: 28
End: 2018-10-19

## 2018-10-19 LAB — ACTH PLAS-MCNC: 14 PG/ML (ref 6–58)

## 2018-10-19 NOTE — TELEPHONE ENCOUNTER
Phone Number Called: [phone] (home)       Message: Tired to contact patient, left message for patient to call [phone]      Left Message for patient to call back: yes

## 2018-10-19 NOTE — TELEPHONE ENCOUNTER
----- Message from EMMA David sent at 10/19/2018  7:22 AM PDT -----  Please notify patient that her electrolytes and kidney function are normal.     Her urine protein has normalized indicating that her kidneys are no longer stressed.     Her urinalysis shows that she is likely a little dehydrated and should try to push more water. No sign of infection.     EMMA David

## 2018-10-23 ENCOUNTER — OFFICE VISIT (OUTPATIENT)
Dept: NEPHROLOGY | Facility: MEDICAL CENTER | Age: 28
End: 2018-10-23
Payer: MEDICAID

## 2018-10-23 VITALS
WEIGHT: 131 LBS | RESPIRATION RATE: 14 BRPM | BODY MASS INDEX: 21.83 KG/M2 | TEMPERATURE: 99 F | SYSTOLIC BLOOD PRESSURE: 94 MMHG | HEIGHT: 65 IN | OXYGEN SATURATION: 99 % | HEART RATE: 73 BPM | DIASTOLIC BLOOD PRESSURE: 58 MMHG

## 2018-10-23 DIAGNOSIS — N20.0 NEPHROLITHIASIS: ICD-10-CM

## 2018-10-23 DIAGNOSIS — E03.8 SUBCLINICAL HYPOTHYROIDISM: ICD-10-CM

## 2018-10-23 DIAGNOSIS — R80.9 MICROALBUMINURIA: ICD-10-CM

## 2018-10-23 DIAGNOSIS — N18.9 CHRONIC KIDNEY DISEASE, UNSPECIFIED CKD STAGE: ICD-10-CM

## 2018-10-23 DIAGNOSIS — E23.2 DIABETES INSIPIDUS (HCC): ICD-10-CM

## 2018-10-23 PROCEDURE — 99203 OFFICE O/P NEW LOW 30 MIN: CPT | Performed by: INTERNAL MEDICINE

## 2018-10-23 RX ORDER — LEVOTHYROXINE SODIUM 0.03 MG/1
25 TABLET ORAL
Qty: 30 TAB | Refills: 0 | Status: SHIPPED | OUTPATIENT
Start: 2018-10-23 | End: 2018-11-19 | Stop reason: SDUPTHER

## 2018-10-23 ASSESSMENT — ENCOUNTER SYMPTOMS
CHILLS: 0
COUGH: 0
NAUSEA: 0
VOMITING: 0
NERVOUS/ANXIOUS: 1
BACK PAIN: 1
ABDOMINAL PAIN: 1
FEVER: 0
SHORTNESS OF BREATH: 0

## 2018-10-23 NOTE — PROGRESS NOTES
"Subjective:      Mary Carmen Head is a 28 y.o. female who presents with Chronic Kidney Disease            The patient was diagnosed with diabetes insipidus, also has a chronic abdominal and back pain, was recently diagnosed with possible endometriosis.  She had an abdominal CT scan which showed nonobstructing right kidney stone  Patient had proteinuria earlier this year but the recent micro albumin /creatinine ratio is normal      Chronic Kidney Disease   This is a new problem. The current episode started more than 1 month ago. The problem has been unchanged. Associated symptoms include abdominal pain. Pertinent negatives include no chest pain, chills, coughing, fever, nausea, urinary symptoms or vomiting. Nothing aggravates the symptoms. She has tried NSAIDs for the symptoms.       Review of Systems   Constitutional: Negative for chills, fever and malaise/fatigue.   Respiratory: Negative for cough and shortness of breath.    Cardiovascular: Negative for chest pain and leg swelling.   Gastrointestinal: Positive for abdominal pain. Negative for nausea and vomiting.   Genitourinary: Negative for dysuria, frequency and urgency.   Musculoskeletal: Positive for back pain.   Psychiatric/Behavioral: The patient is nervous/anxious.           Objective:     BP (!) 94/58 (BP Location: Right arm, Patient Position: Sitting, BP Cuff Size: Adult)   Pulse 73   Temp 37.2 °C (99 °F) (Temporal)   Resp 14   Ht 1.651 m (5' 5\")   Wt 59.4 kg (131 lb)   SpO2 99%   BMI 21.80 kg/m²      Physical Exam   Constitutional: She is oriented to person, place, and time. She appears well-developed and well-nourished. No distress.   HENT:   Head: Normocephalic and atraumatic.   Right Ear: External ear normal.   Left Ear: External ear normal.   Nose: Nose normal.   Eyes: Conjunctivae are normal. Right eye exhibits no discharge. Left eye exhibits no discharge. No scleral icterus.   Cardiovascular: Normal rate and regular rhythm.  "   Pulmonary/Chest: Effort normal and breath sounds normal. No respiratory distress.   Musculoskeletal: She exhibits no edema.   Neurological: She is alert and oriented to person, place, and time.   Skin: Skin is warm. She is not diaphoretic.   Psychiatric: Her mood appears anxious.   Nursing note and vitals reviewed.    I have reviewed the abdominal CT scan images with the patient which showed nonobstructing right kidney stone          Assessment/Plan:     1. Microalbuminuria  No clear etiology  Possible UTI  Repeat micro cath ratio annually    2. Chronic kidney disease, unspecified CKD stage  I do not think her back pain is kidney in origin  Creatinine is normal  Renal dose all medication  Avoid nephrotoxins like NSAIDs    3. Diabetes insipidus (HCC)    4. Nephrolithiasis  Patient was advised to increase her water intake to about 3 L per 24-hour  Low-sodium diet    Over 50% of this 30 minute visit was spent on counseling and coordination of care regarding diet, side effects, and complication.

## 2018-11-19 ENCOUNTER — OFFICE VISIT (OUTPATIENT)
Dept: MEDICAL GROUP | Facility: MEDICAL CENTER | Age: 28
End: 2018-11-19

## 2018-11-19 VITALS
DIASTOLIC BLOOD PRESSURE: 62 MMHG | SYSTOLIC BLOOD PRESSURE: 106 MMHG | BODY MASS INDEX: 21.83 KG/M2 | TEMPERATURE: 97.7 F | OXYGEN SATURATION: 95 % | HEIGHT: 65 IN | WEIGHT: 131 LBS | HEART RATE: 69 BPM

## 2018-11-19 DIAGNOSIS — N80.9 ENDOMETRIOSIS: ICD-10-CM

## 2018-11-19 DIAGNOSIS — E03.9 ACQUIRED HYPOTHYROIDISM: ICD-10-CM

## 2018-11-19 DIAGNOSIS — E23.2 DIABETES INSIPIDUS (HCC): ICD-10-CM

## 2018-11-19 PROBLEM — N30.01 ACUTE CYSTITIS WITH HEMATURIA: Status: RESOLVED | Noted: 2018-05-23 | Resolved: 2018-11-19

## 2018-11-19 PROBLEM — M54.9 CVA TENDERNESS: Status: RESOLVED | Noted: 2018-08-16 | Resolved: 2018-11-19

## 2018-11-19 PROCEDURE — 99214 OFFICE O/P EST MOD 30 MIN: CPT | Performed by: FAMILY MEDICINE

## 2018-11-20 NOTE — PROGRESS NOTES
Subjective:   Mary Carmen Head is a 28 y.o. female here today for diabetes insipidus, endometriosis, hypothyroidism    Patient is having difficulty with diabetes insipidus.  She is being followed by endocrinology.  Patient is having a hard time working and had to quit her job because she was going to the bathroom every 10 minutes.  She is getting to a point where she needs to have something done and has requested a referral to [facility] in [place].    Patient has suspected endometriosis and her GYN has requested oral contraceptive pills to see if they help to help diagnose the endometriosis.  Patient is concerned because she bruises easily and she was asking if this is a risk factor for blood clots.  She is not a smoker.    Patient has been diagnosed with hypothyroidism because her TSH is the 60s and she is symptomatic.  She has been started levothyroxine 25 mcg daily.  She was off the medication for 2 weeks but now is back on the medication.  She has follow-up with her endocrinologist next month.    Current medicines (including changes today)  Current Outpatient Prescriptions   Medication Sig Dispense Refill   • levothyroxine (SYNTHROID) 25 MCG Tab TAKE 1 TAB BY MOUTH EVERY MORNING ON AN EMPTY STOMACH. 30 Tab 0   • GARLIC PO Take  by mouth.     • Capsicum, Cayenne, (CAYENNE PO) Take  by mouth.     • vitamin D, Ergocalciferol, (DRISDOL) 21650 units Cap capsule Take 1 Cap by mouth every 7 days. 12 Cap 3   • desmopressin (DDAVP) 0.1 MG Tab Take 2 Tabs by mouth 2 times a day. 360 Tab 3   • MICROGESTIN FE 1/20 1-20 MG-MCG per tablet Take 1 Tab by mouth every day.     • ascorbic acid (ASCORBIC ACID) 500 MG Tab Take 500 mg by mouth every day.     • Multiple Vitamins-Minerals (OCUVITE-LUTEIN) Tab Take 1 tablet by mouth every day.     • Multiple Vitamins-Minerals (HAIR/SKIN/NAILS) Tab Take 1 Tab by mouth every day.     • coenzyme Q-10 30 MG capsule Take 60 mg by mouth every day.     • B Complex-Biotin-FA  "(VITAMIN B50 COMPLEX PO) Take 1 Tab by mouth every day.     • CALCIUM PO Take 1 Tab by mouth every day.     • CRANBERRY PO Take 3 Caps by mouth every day.     • Probiotic Product (PROBIOTIC PO) Take 1 Cap by mouth every day.       No current facility-administered medications for this visit.      She  has no past medical history on file.    ROS   No chest pain, no shortness of breath       Objective:     Blood pressure 106/62, pulse 69, temperature 36.5 °C (97.7 °F), height 1.651 m (5' 5\"), weight 59.4 kg (131 lb), SpO2 95 %, not currently breastfeeding. Body mass index is 21.8 kg/m².   Physical Exam:  Constitutional: Alert, no distress.  Skin: Warm, dry, good turgor, no rashes in visible areas.  Eye: Equal, round and reactive, conjunctiva clear, lids normal.  Psych: Alert and oriented x3, normal affect and mood.        Assessment and Plan:   The following treatment plan was discussed    1. Diabetes insipidus (HCC)  Referral to [facility] per patient's request.  - REFERRAL TO ENDOCRINOLOGY    2. Endometriosis  Advised patient to consider OCP to help her symptoms and that her risk for clot is not more elevated than average.    3. Acquired hypothyroidism  Discussed levothyroxine and thyroid replacement.      Followup: Return in about 3 months (around 2/19/2019) for Multiple issues.         "

## 2018-12-12 ENCOUNTER — OFFICE VISIT (OUTPATIENT)
Dept: ENDOCRINOLOGY | Facility: MEDICAL CENTER | Age: 28
End: 2018-12-12
Payer: MEDICAID

## 2018-12-12 VITALS
OXYGEN SATURATION: 99 % | WEIGHT: 130.6 LBS | HEART RATE: 83 BPM | SYSTOLIC BLOOD PRESSURE: 112 MMHG | BODY MASS INDEX: 21.76 KG/M2 | HEIGHT: 65 IN | DIASTOLIC BLOOD PRESSURE: 70 MMHG

## 2018-12-12 DIAGNOSIS — E23.2 DIABETES INSIPIDUS (HCC): ICD-10-CM

## 2018-12-12 DIAGNOSIS — E53.8 VITAMIN B 12 DEFICIENCY: ICD-10-CM

## 2018-12-12 DIAGNOSIS — E55.9 VITAMIN D DEFICIENCY: ICD-10-CM

## 2018-12-12 DIAGNOSIS — E03.9 ACQUIRED HYPOTHYROIDISM: ICD-10-CM

## 2018-12-12 PROCEDURE — 99214 OFFICE O/P EST MOD 30 MIN: CPT | Performed by: INTERNAL MEDICINE

## 2018-12-12 NOTE — PROGRESS NOTES
Endocrinology Clinic Progress Note  PCP: EMMA David    HPI:  Mary Carmen Head is a 28 y.o. old patient who comes in today for review of multiple endocrine problems.  Diabetes insipidus: currently on DDAVP 0.1 mg 2 tabs bid, hypothyroid currently on Levothyroxine 25 mcg daily and vitamin d deficiency and using Ergocalcferol 72132 iu per week    Mary Carmen Head is a 28 y.o. old patient who comes in today for evaluation of above stated problem.      ROS:  Constitutional: Flat effect, difficulty emptying bladder ,no weight loss  Cardiac: No palpitations or racing heart  Resp: No shortness of breath  Neuro: No numbness or tinging in feet  Endo: No heat or cold intolerance, no polyuria or polydipsia  All other systems were reviewed and were negative.    Past Medical History:  Patient Active Problem List    Diagnosis Date Noted   • Vitamin D deficiency 12/12/2018   • Endometriosis 11/19/2018   • Acquired hypothyroidism 11/19/2018   • Bladder pain 08/16/2018   • Microalbuminuria 08/16/2018   • Food aversion 07/13/2018   • PMDD (premenstrual dysphoric disorder) 07/05/2018   • Nerve pain 07/05/2018   • Muscle twitching 07/05/2018   • Lower abdominal pain 06/13/2018   • Diabetes insipidus (HCC) 06/08/2018   • Polyuria 05/23/2018   • Polydipsia 05/23/2018   • Tooth decay 05/23/2018   • Dry mouth 05/23/2018   • Bilateral dry eyes 05/23/2018   • Dysuria 05/23/2018       Past Surgical History:  History reviewed. No pertinent surgical history.    Allergies:  Claritin    Social History:  Social History     Social History   • Marital status: Single     Spouse name: N/A   • Number of children: N/A   • Years of education: N/A     Occupational History   • Not on file.     Social History Main Topics   • Smoking status: Former Smoker   • Smokeless tobacco: Never Used   • Alcohol use No   • Drug use: No   • Sexual activity: Not Currently     Other Topics Concern   • Not on file     Social History  "Narrative   • No narrative on file       Family History:  Family History   Problem Relation Age of Onset   • Hypertension Mother    • Thyroid Mother    • Heart Disease Maternal Grandmother    • Heart Disease Maternal Grandfather    • Heart Disease Paternal Grandmother    • Scoliosis Paternal Grandmother    • Prostate cancer Paternal Grandfather        Medications:    Current Outpatient Prescriptions:   •  levothyroxine (SYNTHROID) 25 MCG Tab, TAKE 1 TAB BY MOUTH EVERY MORNING ON AN EMPTY STOMACH., Disp: 30 Tab, Rfl: 0  •  GARLIC PO, Take  by mouth., Disp: , Rfl:   •  Capsicum, Cayenne, (CAYENNE PO), Take  by mouth., Disp: , Rfl:   •  vitamin D, Ergocalciferol, (DRISDOL) 59921 units Cap capsule, Take 1 Cap by mouth every 7 days., Disp: 12 Cap, Rfl: 3  •  desmopressin (DDAVP) 0.1 MG Tab, Take 2 Tabs by mouth 2 times a day., Disp: 360 Tab, Rfl: 3  •  ascorbic acid (ASCORBIC ACID) 500 MG Tab, Take 500 mg by mouth every day., Disp: , Rfl:   •  Multiple Vitamins-Minerals (OCUVITE-LUTEIN) Tab, Take 1 tablet by mouth every day., Disp: , Rfl:   •  Multiple Vitamins-Minerals (HAIR/SKIN/NAILS) Tab, Take 1 Tab by mouth every day., Disp: , Rfl:   •  coenzyme Q-10 30 MG capsule, Take 60 mg by mouth every day., Disp: , Rfl:   •  B Complex-Biotin-FA (VITAMIN B50 COMPLEX PO), Take 1 Tab by mouth every day., Disp: , Rfl:   •  CALCIUM PO, Take 1 Tab by mouth every day., Disp: , Rfl:   •  Probiotic Product (PROBIOTIC PO), Take 1 Cap by mouth every day., Disp: , Rfl:   •  MICROGESTIN FE 1/20 1-20 MG-MCG per tablet, Take 1 Tab by mouth every day., Disp: , Rfl:     Labs: Reviewed    Physical Examination:  Vital signs: /70   Pulse 83   Ht 1.651 m (5' 5\")   Wt 59.2 kg (130 lb 9.6 oz)   SpO2 99%   BMI 21.73 kg/m²  Body mass index is 21.73 kg/m².  General: No apparent distress, cooperative,thin  Eyes: No scleral icterus or discharge  ENMT: Normal on external inspection of nose, lips, normal thyroid exam  Neck: No abnormal masses on " inspection  Resp: Normal effort, clear to auscultation bilaterally   CVS: Regular rate and rhythm, S1 S2 normal, no murmur   Extremities: No edema  Abdomen: no abdominal obesity present  Neuro: Alert and oriented  Skin: No rash  Psych: Normal mood and affect, intact memory and able to make informed decisions    Assessment and Plan:    1. Acquired hypothyroidism  Continue levothyroxine.    2. Diabetes insipidus (HCC)  continue DDAVP.    3. Vitamin D deficiency  Continue vitamin D supplementation    Return in about 2 months (around 2/12/2019).    Thank you for allowing me to participate in the care of this patient.    Sandeep Morrell M.D.  12/12/18    CC:   ADEBAYO David.    This note was created using voice recognition software (Dragon). The accuracy of the dictation is limited by the abilities of the software. I have reviewed the note prior to signing, however some errors in grammar and context are still possible. If you have any questions related to this note please do not hesitate to contact our office.

## 2018-12-31 DIAGNOSIS — E03.8 SUBCLINICAL HYPOTHYROIDISM: ICD-10-CM

## 2018-12-31 RX ORDER — LEVOTHYROXINE SODIUM 0.03 MG/1
25 TABLET ORAL
Qty: 30 TAB | Refills: 4 | Status: SHIPPED | OUTPATIENT
Start: 2018-12-31 | End: 2019-05-07 | Stop reason: SDUPTHER

## 2019-01-12 ENCOUNTER — OFFICE VISIT (OUTPATIENT)
Dept: URGENT CARE | Facility: CLINIC | Age: 29
End: 2019-01-12
Payer: MEDICAID

## 2019-01-12 ENCOUNTER — APPOINTMENT (OUTPATIENT)
Dept: RADIOLOGY | Facility: IMAGING CENTER | Age: 29
End: 2019-01-12
Attending: PHYSICIAN ASSISTANT
Payer: COMMERCIAL

## 2019-01-12 VITALS
HEIGHT: 65 IN | RESPIRATION RATE: 16 BRPM | SYSTOLIC BLOOD PRESSURE: 110 MMHG | HEART RATE: 66 BPM | BODY MASS INDEX: 21.66 KG/M2 | OXYGEN SATURATION: 94 % | WEIGHT: 130 LBS | DIASTOLIC BLOOD PRESSURE: 78 MMHG | TEMPERATURE: 99.1 F

## 2019-01-12 DIAGNOSIS — S69.90XA FINGER INJURY, INITIAL ENCOUNTER: ICD-10-CM

## 2019-01-12 PROCEDURE — 73130 X-RAY EXAM OF HAND: CPT | Mod: 26,RT | Performed by: PHYSICIAN ASSISTANT

## 2019-01-12 PROCEDURE — 99214 OFFICE O/P EST MOD 30 MIN: CPT | Performed by: PHYSICIAN ASSISTANT

## 2019-01-12 RX ORDER — CEFUROXIME AXETIL 250 MG/1
250 TABLET ORAL 2 TIMES DAILY
Qty: 14 TAB | Refills: 0 | Status: SHIPPED | OUTPATIENT
Start: 2019-01-12 | End: 2019-01-19

## 2019-01-12 RX ORDER — METHYLPREDNISOLONE 4 MG/1
TABLET ORAL
Qty: 1 TAB | Refills: 0 | Status: SHIPPED | OUTPATIENT
Start: 2019-01-12 | End: 2019-04-08

## 2019-01-12 ASSESSMENT — ENCOUNTER SYMPTOMS
NUMBNESS: 0
SENSORY CHANGE: 0
WEAKNESS: 1
FOCAL WEAKNESS: 1
JOINT SWELLING: 1

## 2019-01-12 NOTE — PROGRESS NOTES
"Subjective:      Mary Carmen Head is a 28 y.o. female who presents with No chief complaint on file.            Other   This is a new (finger swell) problem. The current episode started in the past 7 days. The problem occurs constantly. The problem has been unchanged. Associated symptoms include joint swelling and weakness. Pertinent negatives include no numbness. The symptoms are aggravated by bending. She has tried rest for the symptoms. The treatment provided no relief.       Review of Systems   Musculoskeletal: Positive for joint pain and joint swelling.   Skin: Negative.    Neurological: Positive for focal weakness and weakness. Negative for sensory change and numbness.          Objective:     Pulse 66   Temp 37.3 °C (99.1 °F) (Temporal)   Resp 16   Ht 1.651 m (5' 5\")   Wt 59 kg (130 lb)   SpO2 94%   BMI 21.63 kg/m²      Physical Exam   Constitutional: She is oriented to person, place, and time. She appears well-developed and well-nourished. No distress.   Musculoskeletal: She exhibits edema and tenderness. She exhibits no deformity.   Neurological: She is alert and oriented to person, place, and time. No sensory deficit. She exhibits abnormal muscle tone. Coordination normal.   Skin: Skin is warm and dry. Capillary refill takes less than 2 seconds. No erythema.   Psychiatric: She has a normal mood and affect. Her behavior is normal.   Nursing note and vitals reviewed.    Active Ambulatory Problems     Diagnosis Date Noted   • Polyuria 05/23/2018   • Polydipsia 05/23/2018   • Tooth decay 05/23/2018   • Dry mouth 05/23/2018   • Bilateral dry eyes 05/23/2018   • Dysuria 05/23/2018   • Diabetes insipidus (HCC) 06/08/2018   • Lower abdominal pain 06/13/2018   • PMDD (premenstrual dysphoric disorder) 07/05/2018   • Nerve pain 07/05/2018   • Muscle twitching 07/05/2018   • Food aversion 07/13/2018   • Bladder pain 08/16/2018   • Microalbuminuria 08/16/2018   • Endometriosis 11/19/2018   • Acquired " hypothyroidism 11/19/2018   • Vitamin D deficiency 12/12/2018     Resolved Ambulatory Problems     Diagnosis Date Noted   • Acute cystitis with hematuria 05/23/2018   • CVA tenderness 08/16/2018     Past Medical History:   Diagnosis Date   • Vitamin D deficiency 12/12/2018     Current Outpatient Prescriptions on File Prior to Visit   Medication Sig Dispense Refill   • levothyroxine (SYNTHROID) 25 MCG Tab Take 1 Tab by mouth Every morning on an empty stomach. 30 Tab 4   • GARLIC PO Take  by mouth.     • Capsicum, Cayenne, (CAYENNE PO) Take  by mouth.     • vitamin D, Ergocalciferol, (DRISDOL) 99228 units Cap capsule Take 1 Cap by mouth every 7 days. 12 Cap 3   • desmopressin (DDAVP) 0.1 MG Tab Take 2 Tabs by mouth 2 times a day. 360 Tab 3   • MICROGESTIN FE 1/20 1-20 MG-MCG per tablet Take 1 Tab by mouth every day.     • ascorbic acid (ASCORBIC ACID) 500 MG Tab Take 500 mg by mouth every day.     • Multiple Vitamins-Minerals (OCUVITE-LUTEIN) Tab Take 1 tablet by mouth every day.     • Multiple Vitamins-Minerals (HAIR/SKIN/NAILS) Tab Take 1 Tab by mouth every day.     • coenzyme Q-10 30 MG capsule Take 60 mg by mouth every day.     • B Complex-Biotin-FA (VITAMIN B50 COMPLEX PO) Take 1 Tab by mouth every day.     • CALCIUM PO Take 1 Tab by mouth every day.     • Probiotic Product (PROBIOTIC PO) Take 1 Cap by mouth every day.       No current facility-administered medications on file prior to visit.      Social History     Social History   • Marital status: Single     Spouse name: N/A   • Number of children: N/A   • Years of education: N/A     Occupational History   • Not on file.     Social History Main Topics   • Smoking status: Former Smoker   • Smokeless tobacco: Never Used   • Alcohol use No   • Drug use: No   • Sexual activity: Not Currently     Other Topics Concern   • Not on file     Social History Narrative   • No narrative on file     Family History   Problem Relation Age of Onset   • Hypertension Mother    •  Thyroid Mother    • Heart Disease Maternal Grandmother    • Heart Disease Maternal Grandfather    • Heart Disease Paternal Grandmother    • Scoliosis Paternal Grandmother    • Prostate cancer Paternal Grandfather      Claritin         xr ng  (read/interpret. By me. Rw)       Assessment/Plan:     ·  finger swell; contusion      · RICE, nsaids; splint prn

## 2019-02-22 ENCOUNTER — OFFICE VISIT (OUTPATIENT)
Dept: MEDICAL GROUP | Facility: MEDICAL CENTER | Age: 29
End: 2019-02-22
Payer: COMMERCIAL

## 2019-02-22 VITALS
RESPIRATION RATE: 16 BRPM | OXYGEN SATURATION: 94 % | DIASTOLIC BLOOD PRESSURE: 68 MMHG | WEIGHT: 132 LBS | SYSTOLIC BLOOD PRESSURE: 110 MMHG | BODY MASS INDEX: 21.99 KG/M2 | HEART RATE: 74 BPM | TEMPERATURE: 98.3 F | HEIGHT: 65 IN

## 2019-02-22 DIAGNOSIS — M79.641 PAIN OF RIGHT HAND: ICD-10-CM

## 2019-02-22 PROCEDURE — 99214 OFFICE O/P EST MOD 30 MIN: CPT | Performed by: NURSE PRACTITIONER

## 2019-02-23 NOTE — PROGRESS NOTES
Subjective:   Mary Carmen Head is a 28 y.o. female here today for hand pain    Pain of right hand  Ongoing for over 1 month. She hit her knuckle on something and it began to swell, now has developed multiple red, painful vesicles on fingers with surrounding swelling. Was seen in urgent care for this and prescribed antibiotic, Medrol Dosepak, and steroid cream.  She was reluctant to take the steroid or antibiotic and has only been using the cream which does help with the itching/burning.  Symptoms are worsening with more vesicles appearing on fingers.  Hands are very sensitive to touch.     She denies fevers, chills, body aches, nausea, fatigue.  She otherwise feels well aside from the pain.  No recent sick contacts.  Not taking any immune suppressive medications.       Current medicines (including changes today)  Current Outpatient Prescriptions   Medication Sig Dispense Refill   • MethylPREDNISolone (MEDROL DOSEPAK) 4 MG Tablet Therapy Pack U.D. 1 Tab 0   • fluocinonide (LIDEX) 0.05 % Cream Apply 1 Application to affected area(s) 2 times a day. For skin rash or inflammation 30 g 0   • levothyroxine (SYNTHROID) 25 MCG Tab Take 1 Tab by mouth Every morning on an empty stomach. 30 Tab 4   • GARLIC PO Take  by mouth.     • Capsicum, Cayenne, (CAYENNE PO) Take  by mouth.     • vitamin D, Ergocalciferol, (DRISDOL) 42476 units Cap capsule Take 1 Cap by mouth every 7 days. 12 Cap 3   • desmopressin (DDAVP) 0.1 MG Tab Take 2 Tabs by mouth 2 times a day. 360 Tab 3   • MICROGESTIN FE 1/20 1-20 MG-MCG per tablet Take 1 Tab by mouth every day.     • ascorbic acid (ASCORBIC ACID) 500 MG Tab Take 500 mg by mouth every day.     • Multiple Vitamins-Minerals (OCUVITE-LUTEIN) Tab Take 1 tablet by mouth every day.     • Multiple Vitamins-Minerals (HAIR/SKIN/NAILS) Tab Take 1 Tab by mouth every day.     • coenzyme Q-10 30 MG capsule Take 60 mg by mouth every day.     • B Complex-Biotin-FA (VITAMIN B50 COMPLEX PO) Take 1 Tab  "by mouth every day.     • CALCIUM PO Take 1 Tab by mouth every day.     • Probiotic Product (PROBIOTIC PO) Take 1 Cap by mouth every day.       No current facility-administered medications for this visit.      She  has a past medical history of Vitamin D deficiency (12/12/2018).    ROS   No chest pain, no shortness of breath, no abdominal pain  Positive ROS as per HPI.  All other systems reviewed and are negative.     Objective:     Blood pressure 110/68, pulse 74, temperature 36.8 °C (98.3 °F), temperature source Temporal, resp. rate 16, height 1.651 m (5' 5\"), weight 59.9 kg (132 lb), SpO2 94 %, not currently breastfeeding. Body mass index is 21.97 kg/m².     Physical Exam:  Constitutional: Alert, no distress.  Skin: Warm, dry, good turgor, + multiple slightly raised, erythematous papules on fingers of right hand with surrounding erythema and edema.  Fingers appear swollen. some papules with white center  Eye: Equal, round and reactive, conjunctiva clear, lids normal.  ENMT: Lips without lesions, good dentition, oropharynx clear.  Neck: Trachea midline, no masses, no thyromegaly. No cervical or supraclavicular lymphadenopathy  Respiratory: Unlabored respiratory effort, lungs clear to auscultation, no wheezes, no ronchi.  Cardiovascular: Normal S1, S2, no murmur, no edema.  Abdomen: Soft, non-tender, no masses, no hepatosplenomegaly.  Psych: Alert and oriented x3, normal affect and mood.      Assessment and Plan:   The following treatment plan was discussed    1. Pain of right hand  Unstable  Possibly dyshidrosis versus herpetic veronica  Does not seem infectious, especially since this has been going on for 1-1/2 months and she is not having any systemic infection symptoms such as fever or significant worsening.  Advised patient to continue using steroid cream, use it more regularly twice daily.  Advised patient to begin Medrol Dosepak that was prescribed by urgent care, hold off on antibiotic at this time.  If " symptoms significantly worsen with steroid, stop steroid and begin antibiotic immediately.  Patient verbalized understanding.  She follows up early next week, we will reevaluate at that time.      Followup: Return in about 1 week (around 3/1/2019).    I have placed the below orders and discussed them with an approved delegating provider. The MA is performing the below orders under the direction of Dr. Villalobos

## 2019-02-23 NOTE — ASSESSMENT & PLAN NOTE
Ongoing for over 1 month. She hit her knuckle on something and it began to swell, now has developed multiple red, painful vesicles on fingers with surrounding swelling. Was seen in urgent care for this and prescribed antibiotic, Medrol Dosepak, and steroid cream.  She was reluctant to take the steroid or antibiotic and has only been using the cream which does help with the itching/burning.  Symptoms are worsening with more vesicles appearing on fingers.  Hands are very sensitive to touch.     She denies fevers, chills, body aches, nausea, fatigue.  She otherwise feels well aside from the pain.  No recent sick contacts.  Not taking any immune suppressive medications.

## 2019-03-06 ENCOUNTER — OFFICE VISIT (OUTPATIENT)
Dept: MEDICAL GROUP | Facility: MEDICAL CENTER | Age: 29
End: 2019-03-06
Payer: COMMERCIAL

## 2019-03-06 VITALS
OXYGEN SATURATION: 99 % | SYSTOLIC BLOOD PRESSURE: 100 MMHG | DIASTOLIC BLOOD PRESSURE: 60 MMHG | HEART RATE: 82 BPM | WEIGHT: 132 LBS | BODY MASS INDEX: 21.99 KG/M2 | RESPIRATION RATE: 16 BRPM | TEMPERATURE: 98.1 F | HEIGHT: 65 IN

## 2019-03-06 DIAGNOSIS — F54 PSYCHOGENIC POLYDIPSIA: ICD-10-CM

## 2019-03-06 DIAGNOSIS — G89.29 CHRONIC ABDOMINAL PAIN: ICD-10-CM

## 2019-03-06 DIAGNOSIS — M79.641 PAIN OF RIGHT HAND: ICD-10-CM

## 2019-03-06 DIAGNOSIS — H02.846 PAIN AND SWELLING OF EYELIDS OF BOTH EYES: ICD-10-CM

## 2019-03-06 DIAGNOSIS — H02.89 PAIN AND SWELLING OF EYELIDS OF BOTH EYES: ICD-10-CM

## 2019-03-06 DIAGNOSIS — H02.843 PAIN AND SWELLING OF EYELIDS OF BOTH EYES: ICD-10-CM

## 2019-03-06 DIAGNOSIS — R63.1 PSYCHOGENIC POLYDIPSIA: ICD-10-CM

## 2019-03-06 DIAGNOSIS — Z91.410 HISTORY OF ADULT PHYSICAL AND SEXUAL ABUSE: ICD-10-CM

## 2019-03-06 DIAGNOSIS — R10.9 CHRONIC ABDOMINAL PAIN: ICD-10-CM

## 2019-03-06 DIAGNOSIS — F43.10 PTSD (POST-TRAUMATIC STRESS DISORDER): ICD-10-CM

## 2019-03-06 PROBLEM — R42 DIZZINESS: Status: ACTIVE | Noted: 2019-03-06

## 2019-03-06 PROBLEM — R42 DIZZINESS: Status: RESOLVED | Noted: 2019-03-06 | Resolved: 2019-03-06

## 2019-03-06 PROCEDURE — 99214 OFFICE O/P EST MOD 30 MIN: CPT | Performed by: NURSE PRACTITIONER
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
## 2019-03-07 NOTE — ASSESSMENT & PLAN NOTE
Ongoing for years. Gets redness, swelling, pain, and cracking of the skin of her bilateral lower eyelids. Worse in the winter when it's dry. Using Aquapor, neosporin, vasaline intensive care lotion and melissa butter for this. Recently worsened again. Causes her to have the appearance of bags under her eyes.
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
## 2019-03-07 NOTE — ASSESSMENT & PLAN NOTE
Rash has improved with steroid use. Now has dry, healing patches where ulcerations were. Pain and redness have improved.

## 2019-03-18 ENCOUNTER — TELEPHONE (OUTPATIENT)
Dept: MEDICAL GROUP | Facility: MEDICAL CENTER | Age: 29
End: 2019-03-18

## 2019-03-18 DIAGNOSIS — H02.89 PAIN AND SWELLING OF EYELIDS OF BOTH EYES: ICD-10-CM

## 2019-03-18 DIAGNOSIS — H02.843 PAIN AND SWELLING OF EYELIDS OF BOTH EYES: ICD-10-CM

## 2019-03-18 DIAGNOSIS — H02.846 PAIN AND SWELLING OF EYELIDS OF BOTH EYES: ICD-10-CM

## 2019-03-18 RX ORDER — TRIAMCINOLONE ACETONIDE 0.25 MG/G
1 CREAM TOPICAL 2 TIMES DAILY
Qty: 1 TUBE | Refills: 1 | Status: SHIPPED | OUTPATIENT
Start: 2019-03-18 | End: 2019-06-12

## 2019-03-18 NOTE — TELEPHONE ENCOUNTER
Prescription sent for under eye redness and swelling, possibly allergic or contact dermatitis.     ADEBAYO David.

## 2019-03-18 NOTE — TELEPHONE ENCOUNTER
----- Message from Mary Carmen Head sent at 3/18/2019  3:12 PM PDT -----  Regarding: RE: RE: Non-Urgent Medical Question  Contact: [phone]  They haven't improved at all, but I haven't had time to try any cooling pads or cucumbers or anything like that yet.    ----- Message -----  From: EMMA David  Sent: 3/18/19 8:09 AM  To: Mary Carmen Head  Subject: RE: Non-Urgent Medical Question    Nik Kendrick,    Sorry about the delay. How are your eyes looking now? We may need to do a topical steroid if they aren't improving at all.     EMMA David      ----- Message -----     From: Mary Carmen Head     Sent: 3/14/2019  4:47 AM PDT       To: EMMA David  Subject: Non-Urgent Medical Question    Ok, I went to the optometrist today for yearly checkup and he commented on how swollen my eyelids and the area beneath them are. He said it was an allergic response and I told him they've been like that all winter. He said that that isn't normal and I should bring it up with [you]. I was thinking, the swelling in my right-hand fingers has barely gone down either. I know that autoimmune problems are rampant on my mom's side of my family, hence the hypothyroidism. So I'm not really sure how to address that at all. I'd like to know your thoughts, thank you.

## 2019-04-08 ENCOUNTER — OFFICE VISIT (OUTPATIENT)
Dept: ENDOCRINOLOGY | Facility: MEDICAL CENTER | Age: 29
End: 2019-04-08
Payer: COMMERCIAL

## 2019-04-08 VITALS
RESPIRATION RATE: 15 BRPM | HEIGHT: 65 IN | SYSTOLIC BLOOD PRESSURE: 110 MMHG | BODY MASS INDEX: 22.96 KG/M2 | DIASTOLIC BLOOD PRESSURE: 68 MMHG | OXYGEN SATURATION: 98 % | WEIGHT: 137.8 LBS | HEART RATE: 77 BPM

## 2019-04-08 DIAGNOSIS — E55.9 VITAMIN D DEFICIENCY: ICD-10-CM

## 2019-04-08 DIAGNOSIS — E23.2 DIABETES INSIPIDUS (HCC): ICD-10-CM

## 2019-04-08 DIAGNOSIS — E03.9 ACQUIRED HYPOTHYROIDISM: ICD-10-CM

## 2019-04-08 DIAGNOSIS — E53.8 VITAMIN B 12 DEFICIENCY: ICD-10-CM

## 2019-04-08 PROCEDURE — 99214 OFFICE O/P EST MOD 30 MIN: CPT | Performed by: INTERNAL MEDICINE
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
## 2019-04-26 ENCOUNTER — TELEPHONE (OUTPATIENT)
Dept: MEDICAL GROUP | Facility: MEDICAL CENTER | Age: 29
End: 2019-04-26

## 2019-04-26 DIAGNOSIS — Z91.410 HISTORY OF ADULT PHYSICAL AND SEXUAL ABUSE: ICD-10-CM

## 2019-04-26 DIAGNOSIS — F43.10 PTSD (POST-TRAUMATIC STRESS DISORDER): ICD-10-CM

## 2019-04-26 DIAGNOSIS — F54 PSYCHOGENIC POLYDIPSIA: ICD-10-CM

## 2019-04-26 DIAGNOSIS — R63.1 PSYCHOGENIC POLYDIPSIA: ICD-10-CM

## 2019-05-07 DIAGNOSIS — E03.8 SUBCLINICAL HYPOTHYROIDISM: ICD-10-CM

## 2019-05-07 RX ORDER — LEVOTHYROXINE SODIUM 0.05 MG/1
50 TABLET ORAL
Qty: 90 TAB | Refills: 3 | Status: SHIPPED | OUTPATIENT
Start: 2019-05-07 | End: 2020-01-26 | Stop reason: SDUPTHER

## 2019-05-13 DIAGNOSIS — E03.9 HYPOTHYROIDISM, UNSPECIFIED TYPE: ICD-10-CM

## 2019-05-13 DIAGNOSIS — E23.2 DIABETES INSIPIDUS (HCC): ICD-10-CM

## 2019-06-05 ENCOUNTER — OFFICE VISIT (OUTPATIENT)
Dept: MEDICAL GROUP | Facility: MEDICAL CENTER | Age: 29
End: 2019-06-05
Payer: COMMERCIAL

## 2019-06-05 VITALS
BODY MASS INDEX: 23.32 KG/M2 | SYSTOLIC BLOOD PRESSURE: 108 MMHG | RESPIRATION RATE: 16 BRPM | TEMPERATURE: 98.4 F | OXYGEN SATURATION: 98 % | DIASTOLIC BLOOD PRESSURE: 74 MMHG | HEIGHT: 65 IN | HEART RATE: 74 BPM | WEIGHT: 140 LBS

## 2019-06-05 DIAGNOSIS — K59.04 CHRONIC IDIOPATHIC CONSTIPATION: ICD-10-CM

## 2019-06-05 DIAGNOSIS — R63.39 FOOD AVERSION: ICD-10-CM

## 2019-06-05 DIAGNOSIS — F32.81 PMDD (PREMENSTRUAL DYSPHORIC DISORDER): ICD-10-CM

## 2019-06-05 DIAGNOSIS — R10.30 LOWER ABDOMINAL PAIN: ICD-10-CM

## 2019-06-05 PROCEDURE — 99214 OFFICE O/P EST MOD 30 MIN: CPT | Performed by: NURSE PRACTITIONER

## 2019-06-05 RX ORDER — DROSPIRENONE AND ETHINYL ESTRADIOL 0.02-3(28)
1 KIT ORAL DAILY
Qty: 28 TAB | Refills: 11 | Status: SHIPPED | OUTPATIENT
Start: 2019-06-05 | End: 2019-09-04 | Stop reason: SDUPTHER

## 2019-06-06 PROBLEM — K59.04 CHRONIC IDIOPATHIC CONSTIPATION: Status: ACTIVE | Noted: 2019-06-06

## 2019-06-06 NOTE — PROGRESS NOTES
Subjective:   Mary Carmen Head is a 29 y.o. female here today for the following concern:    PMDD (premenstrual dysphoric disorder)  Seeing Crystal with [facility]. Considering starting birth control, has never been on this in the past. Has struggled with significant depression and suicidal thoughts from about the time she ovulates though her menses, around 3 out of 4 weeks of the month. She reports one week of reprieve where she feels normal and tried to get everything productive done for the month. She has taken antidepressants in the past and has not had a good experience with them, she is worried about starting hormonal birth control as she has had so many issues with her hormones. She feels that she has been worse since being diagnosed with Diabetes Insipidus.        Current medicines (including changes today)  Current Outpatient Prescriptions   Medication Sig Dispense Refill   • drospirenone-ethinyl estradiol (ANDRES) 3-0.02 MG per tablet Take 1 Tab by mouth every day. 28 Tab 11   • levothyroxine (SYNTHROID) 50 MCG Tab Take 1 Tab by mouth Every morning on an empty stomach. 90 Tab 3   • triamcinolone acetonide (KENALOG) 0.025 % Cream Apply 1 Application to affected area(s) 2 times a day. 1 Tube 1   • GARLIC PO Take  by mouth.     • Capsicum, Cayenne, (CAYENNE PO) Take  by mouth.     • vitamin D, Ergocalciferol, (DRISDOL) 48165 units Cap capsule Take 1 Cap by mouth every 7 days. 12 Cap 3   • desmopressin (DDAVP) 0.1 MG Tab Take 2 Tabs by mouth 2 times a day. 360 Tab 3   • ascorbic acid (ASCORBIC ACID) 500 MG Tab Take 500 mg by mouth every day.     • Multiple Vitamins-Minerals (OCUVITE-LUTEIN) Tab Take 1 tablet by mouth every day.     • Multiple Vitamins-Minerals (HAIR/SKIN/NAILS) Tab Take 1 Tab by mouth every day.     • coenzyme Q-10 30 MG capsule Take 60 mg by mouth every day.     • B Complex-Biotin-FA (VITAMIN B50 COMPLEX PO) Take 1 Tab by mouth every day.     • CALCIUM PO Take 1 Tab by  "mouth every day.     • Probiotic Product (PROBIOTIC PO) Take 1 Cap by mouth every day.       No current facility-administered medications for this visit.      She  has a past medical history of Vitamin D deficiency (12/12/2018).    ROS  No chest pain, no shortness of breath, no abdominal pain  Positive ROS as per HPI.  All other systems reviewed and are negative.     Objective:     /74 (BP Location: Right arm, Patient Position: Sitting, BP Cuff Size: Adult)   Pulse 74   Temp 36.9 °C (98.4 °F) (Temporal)   Resp 16   Ht 1.651 m (5' 5\")   Wt 63.5 kg (140 lb)   SpO2 98%  Body mass index is 23.3 kg/m².     Physical Exam:  Constitutional: Alert, no distress.  Skin: Warm, dry, good turgor, no rashes in visible areas.  Eye: Equal, round, conjunctiva clear, lids normal.  ENMT: Lips without lesions, good dentition  Neck: Trachea midline  Respiratory: Unlabored respiratory effort  Cardiovascular: No edema.  Psych: Alert and oriented x3, normal affect and mood.      Assessment and Plan:   The following treatment plan was discussed    1. PMDD (premenstrual dysphoric disorder)  Unstable  Trial of Andres  Discussed S/E and to stop for worsening depression, migraine, or SI.   - drospirenone-ethinyl estradiol (ANDRES) 3-0.02 MG per tablet; Take 1 Tab by mouth every day.  Dispense: 28 Tab; Refill: 11    2. Lower abdominal pain  - T-TRANSGLUTAMINASE (TTG) IGA; Future  - IGA SERUM QUANT; Future    3. Food aversion  - T-TRANSGLUTAMINASE (TTG) IGA; Future  - IGA SERUM QUANT; Future    4. Chronic idiopathic constipation  Unstable  Check labs per GI, reordered  Advised to follow up with GI  Stop Miralax, use prunes/prune juice, or stool softener regularly.   - T-TRANSGLUTAMINASE (TTG) IGA; Future  - IGA SERUM QUANT; Future      Followup: Return in about 3 months (around 9/5/2019) for PMDD, labs.    I have placed the below orders and discussed them with an approved delegating provider. The MA is performing the below orders under the " direction of Dr. Villalobos

## 2019-06-06 NOTE — ASSESSMENT & PLAN NOTE
Seeing Jeannette with [facility]. Considering starting birth control, has never been on this in the past. Has struggled with significant depression and suicidal thoughts from about the time she ovulates though her menses, around 3 out of 4 weeks of the month. She reports one week of reprieve where she feels normal and tried to get everything productive done for the month. She has taken antidepressants in the past and has not had a good experience with them, she is worried about starting hormonal birth control as she has had so many issues with her hormones. She feels that she has been worse since being diagnosed with Diabetes Insipidus.

## 2019-06-06 NOTE — ASSESSMENT & PLAN NOTE
Ongoing for many years, worse during her menses. Has been using Miralax for this as needed, however she feels it affects her fluid levels in regard to her DI negatively and she feels dehydrated when she uses this. Has also used raisins for this. Drinking adequate water. Was seen by GI who ordered celiac testing and recommended TCA, she declined at that time, has not had lab work done yet. Has not yet followed back up.
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
## 2019-06-12 ENCOUNTER — OFFICE VISIT (OUTPATIENT)
Dept: ENDOCRINOLOGY | Facility: MEDICAL CENTER | Age: 29
End: 2019-06-12
Payer: COMMERCIAL

## 2019-06-12 VITALS
BODY MASS INDEX: 23.03 KG/M2 | OXYGEN SATURATION: 96 % | WEIGHT: 138.2 LBS | HEIGHT: 65 IN | HEART RATE: 74 BPM | SYSTOLIC BLOOD PRESSURE: 112 MMHG | DIASTOLIC BLOOD PRESSURE: 70 MMHG

## 2019-06-12 DIAGNOSIS — E55.9 VITAMIN D DEFICIENCY: ICD-10-CM

## 2019-06-12 DIAGNOSIS — E23.2 DIABETES INSIPIDUS (HCC): ICD-10-CM

## 2019-06-12 DIAGNOSIS — E03.9 ACQUIRED HYPOTHYROIDISM: ICD-10-CM

## 2019-06-12 PROCEDURE — 99214 OFFICE O/P EST MOD 30 MIN: CPT | Performed by: INTERNAL MEDICINE

## 2019-06-12 RX ORDER — ERGOCALCIFEROL 1.25 MG/1
50000 CAPSULE ORAL
Qty: 12 CAP | Refills: 3 | Status: SHIPPED | OUTPATIENT
Start: 2019-06-12 | End: 2019-10-21 | Stop reason: SDUPTHER

## 2019-09-04 ENCOUNTER — OFFICE VISIT (OUTPATIENT)
Dept: MEDICAL GROUP | Facility: MEDICAL CENTER | Age: 29
End: 2019-09-04
Payer: COMMERCIAL

## 2019-09-04 VITALS
HEART RATE: 69 BPM | DIASTOLIC BLOOD PRESSURE: 62 MMHG | SYSTOLIC BLOOD PRESSURE: 112 MMHG | TEMPERATURE: 98.4 F | OXYGEN SATURATION: 96 % | HEIGHT: 65 IN | WEIGHT: 138 LBS | BODY MASS INDEX: 22.99 KG/M2

## 2019-09-04 DIAGNOSIS — F33.1 MODERATE EPISODE OF RECURRENT MAJOR DEPRESSIVE DISORDER (HCC): ICD-10-CM

## 2019-09-04 DIAGNOSIS — F32.81 PMDD (PREMENSTRUAL DYSPHORIC DISORDER): ICD-10-CM

## 2019-09-04 PROCEDURE — 99214 OFFICE O/P EST MOD 30 MIN: CPT | Performed by: NURSE PRACTITIONER

## 2019-09-04 RX ORDER — DROSPIRENONE AND ETHINYL ESTRADIOL 0.02-3(28)
1 KIT ORAL DAILY
Qty: 84 TAB | Refills: 3 | Status: SHIPPED | OUTPATIENT
Start: 2019-09-04 | End: 2019-09-20 | Stop reason: SDUPTHER

## 2019-09-04 RX ORDER — VENLAFAXINE HYDROCHLORIDE 37.5 MG/1
37.5 CAPSULE, EXTENDED RELEASE ORAL DAILY
Qty: 30 CAP | Refills: 3 | Status: SHIPPED | OUTPATIENT
Start: 2019-09-04 | End: 2020-04-01

## 2019-09-04 ASSESSMENT — PATIENT HEALTH QUESTIONNAIRE - PHQ9
CLINICAL INTERPRETATION OF PHQ2 SCORE: 3
5. POOR APPETITE OR OVEREATING: 3 - NEARLY EVERY DAY
SUM OF ALL RESPONSES TO PHQ QUESTIONS 1-9: 16

## 2019-09-04 NOTE — ASSESSMENT & PLAN NOTE
IBS symptoms, bladder issues, mood/suicidality have significantly improved on birth control. Symptoms return while on menstrual cycle. Has not skipped placebo tablets in the past.

## 2019-09-04 NOTE — ASSESSMENT & PLAN NOTE
Chronic problem, ongoing for several years. Was placed on fluoxetine but did not tolerate this. Is interested in trying something else. Does have significant suicidal thoughts during her menstrual cycle.

## 2019-09-05 NOTE — PROGRESS NOTES
Subjective:   Mary Carmen Head is a 29 y.o. female here today for the following concerns:    PMDD (premenstrual dysphoric disorder)  IBS symptoms, bladder issues, mood/suicidality have significantly improved on birth control. Symptoms return while on menstrual cycle. Has not skipped placebo tablets in the past.     Moderate episode of recurrent major depressive disorder (HCC)  Chronic problem, ongoing for several years. Was placed on fluoxetine but did not tolerate this. Is interested in trying something else. Does have significant suicidal thoughts during her menstrual cycle.        Current medicines (including changes today)  Current Outpatient Medications   Medication Sig Dispense Refill   • drospirenone-ethinyl estradiol (ANDRES) 3-0.02 MG per tablet Take 1 Tab by mouth every day. SKIP SUGAR PILLS, ONLY TAKE HORMONE CONTAINING PILLS. 84 Tab 3   • venlafaxine XR (EFFEXOR XR) 37.5 MG CAPSULE SR 24 HR Take 1 Cap by mouth every day. 30 Cap 3   • vitamin D, Ergocalciferol, (DRISDOL) 72079 units Cap capsule Take 1 Cap by mouth every 7 days. Please provide D3 only and not D2. 12 Cap 3   • levothyroxine (SYNTHROID) 50 MCG Tab Take 1 Tab by mouth Every morning on an empty stomach. 90 Tab 3   • GARLIC PO Take  by mouth.     • Capsicum, Cayenne, (CAYENNE PO) Take  by mouth.     • desmopressin (DDAVP) 0.1 MG Tab Take 2 Tabs by mouth 2 times a day. 360 Tab 3   • ascorbic acid (ASCORBIC ACID) 500 MG Tab Take 500 mg by mouth every day.     • Multiple Vitamins-Minerals (OCUVITE-LUTEIN) Tab Take 1 tablet by mouth every day.     • Multiple Vitamins-Minerals (HAIR/SKIN/NAILS) Tab Take 1 Tab by mouth every day.     • coenzyme Q-10 30 MG capsule Take 60 mg by mouth every day.     • B Complex-Biotin-FA (VITAMIN B50 COMPLEX PO) Take 1 Tab by mouth every day.     • CALCIUM PO Take 1 Tab by mouth every day.     • Probiotic Product (PROBIOTIC PO) Take 1 Cap by mouth every day.       No current facility-administered medications for  "this visit.      She  has a past medical history of Diabetes insipidus (HCC), PMDD (premenstrual dysphoric disorder), and Vitamin D deficiency (12/12/2018).    ROS  No chest pain, no shortness of breath, no abdominal pain  Positive ROS as per HPI.  All other systems reviewed and are negative.     Objective:     /62 (BP Location: Right arm, Patient Position: Sitting, BP Cuff Size: Adult)   Pulse 69   Temp 36.9 °C (98.4 °F) (Temporal)   Ht 1.651 m (5' 5\")   Wt 62.6 kg (138 lb)   SpO2 96%  Body mass index is 22.96 kg/m².     Physical Exam:  Constitutional: Alert, no distress.  Skin: Warm, dry, good turgor, no rashes in visible areas.  Eye: Equal, round and reactive, conjunctiva clear, lids normal.  ENMT: Lips without lesions, good dentition  Neck: Trachea midline  Respiratory: Unlabored respiratory effort  Cardiovascular: No edema.  Psych: Alert and oriented x3, normal affect and mood.      Assessment and Plan:   The following treatment plan was discussed    1. PMDD (premenstrual dysphoric disorder)  Unstable, improving  Due to significant symptoms during menses still, advised patient to skip her sugar pills and only take hormone-containing pills.  - drospirenone-ethinyl estradiol (ANDRES) 3-0.02 MG per tablet; Take 1 Tab by mouth every day. SKIP SUGAR PILLS, ONLY TAKE HORMONE CONTAINING PILLS.  Dispense: 84 Tab; Refill: 3    2. Moderate episode of recurrent major depressive disorder (HCC)  Unstable  Trial of venlafaxine 37.5 mg daily  Discussed potential side effects including GI side effects as well as potential for increased suicidal thoughts.  Advised patient to notify me if she develops worsening suicidal thoughts and to seek immediate care if this is severe.  Patient verbalizes understanding.  - venlafaxine XR (EFFEXOR XR) 37.5 MG CAPSULE SR 24 HR; Take 1 Cap by mouth every day.  Dispense: 30 Cap; Refill: 3      Followup: Return in about 4 weeks (around 10/2/2019).    I have placed the below orders and " discussed them with an approved delegating provider. The MA is performing the below orders under the direction of Dr. Villalobos
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
## 2019-09-17 ENCOUNTER — APPOINTMENT (OUTPATIENT)
Dept: ENDOCRINOLOGY | Facility: MEDICAL CENTER | Age: 29
End: 2019-09-17
Payer: COMMERCIAL

## 2019-09-20 ENCOUNTER — TELEPHONE (OUTPATIENT)
Dept: MEDICAL GROUP | Facility: MEDICAL CENTER | Age: 29
End: 2019-09-20

## 2019-09-20 DIAGNOSIS — F32.81 PMDD (PREMENSTRUAL DYSPHORIC DISORDER): ICD-10-CM

## 2019-09-20 RX ORDER — DROSPIRENONE AND ETHINYL ESTRADIOL 0.02-3(28)
1 KIT ORAL DAILY
Qty: 28 TAB | Refills: 11 | Status: SHIPPED | OUTPATIENT
Start: 2019-09-20 | End: 2020-02-13

## 2019-10-19 ENCOUNTER — PATIENT MESSAGE (OUTPATIENT)
Dept: ENDOCRINOLOGY | Facility: MEDICAL CENTER | Age: 29
End: 2019-10-19

## 2019-10-19 DIAGNOSIS — E55.9 VITAMIN D DEFICIENCY: ICD-10-CM

## 2019-10-21 RX ORDER — ERGOCALCIFEROL 1.25 MG/1
50000 CAPSULE ORAL
Qty: 12 CAP | Refills: 3 | Status: SHIPPED | OUTPATIENT
Start: 2019-10-21 | End: 2020-08-12

## 2019-10-21 NOTE — TELEPHONE ENCOUNTER
From: Mary Carmen Head  To: Sandeep Morrell M.D.  Sent: 10/19/2019 4:59 PM PDT  Subject: Prescription Question    Hi, I was wondering if you could reactivate my Vitamin D prescription? I'm definitely not getting enough from over-the-counter. Was going to bring this up in our next appointment but couldn't really wait any longer.

## 2019-11-04 DIAGNOSIS — E23.2 DIABETES INSIPIDUS (HCC): ICD-10-CM

## 2019-11-05 RX ORDER — DESMOPRESSIN ACETATE 0.1 MG/1
TABLET ORAL
Qty: 360 TAB | Refills: 3 | Status: SHIPPED | OUTPATIENT
Start: 2019-11-05 | End: 2020-12-31 | Stop reason: SDUPTHER

## 2019-11-05 NOTE — TELEPHONE ENCOUNTER
Was the patient seen in the last year in this department? Yes    Does patient have an active prescription for medications requested? No     Received Request Via: Pharmacy     LAST SEEN 06/12/2019
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
## 2019-11-27 ENCOUNTER — OFFICE VISIT (OUTPATIENT)
Dept: ENDOCRINOLOGY | Facility: MEDICAL CENTER | Age: 29
End: 2019-11-27
Payer: COMMERCIAL

## 2019-11-27 VITALS
SYSTOLIC BLOOD PRESSURE: 108 MMHG | DIASTOLIC BLOOD PRESSURE: 72 MMHG | BODY MASS INDEX: 25.83 KG/M2 | OXYGEN SATURATION: 98 % | HEART RATE: 87 BPM | WEIGHT: 155 LBS | HEIGHT: 65 IN

## 2019-11-27 DIAGNOSIS — E55.9 VITAMIN D DEFICIENCY: ICD-10-CM

## 2019-11-27 DIAGNOSIS — E03.9 ACQUIRED HYPOTHYROIDISM: ICD-10-CM

## 2019-11-27 DIAGNOSIS — E23.2 DIABETES INSIPIDUS (HCC): ICD-10-CM

## 2019-11-27 PROCEDURE — 99214 OFFICE O/P EST MOD 30 MIN: CPT | Performed by: INTERNAL MEDICINE

## 2020-01-21 ENCOUNTER — HOSPITAL ENCOUNTER (OUTPATIENT)
Dept: LAB | Facility: MEDICAL CENTER | Age: 30
End: 2020-01-21
Attending: INTERNAL MEDICINE
Payer: COMMERCIAL

## 2020-01-21 ENCOUNTER — HOSPITAL ENCOUNTER (OUTPATIENT)
Dept: LAB | Facility: MEDICAL CENTER | Age: 30
End: 2020-01-21
Attending: NURSE PRACTITIONER
Payer: COMMERCIAL

## 2020-01-21 DIAGNOSIS — E55.9 VITAMIN D DEFICIENCY: ICD-10-CM

## 2020-01-21 DIAGNOSIS — E23.2 DIABETES INSIPIDUS (HCC): ICD-10-CM

## 2020-01-21 DIAGNOSIS — E53.8 VITAMIN B 12 DEFICIENCY: ICD-10-CM

## 2020-01-21 DIAGNOSIS — K59.04 CHRONIC IDIOPATHIC CONSTIPATION: ICD-10-CM

## 2020-01-21 DIAGNOSIS — R63.39 FOOD AVERSION: ICD-10-CM

## 2020-01-21 DIAGNOSIS — E03.9 ACQUIRED HYPOTHYROIDISM: ICD-10-CM

## 2020-01-21 DIAGNOSIS — R10.30 LOWER ABDOMINAL PAIN: ICD-10-CM

## 2020-01-21 LAB
ANION GAP SERPL CALC-SCNC: 7 MMOL/L (ref 0–11.9)
BUN SERPL-MCNC: 8 MG/DL (ref 8–22)
CALCIUM SERPL-MCNC: 9.7 MG/DL (ref 8.5–10.5)
CHLORIDE SERPL-SCNC: 101 MMOL/L (ref 96–112)
CO2 SERPL-SCNC: 27 MMOL/L (ref 20–33)
CREAT SERPL-MCNC: 0.77 MG/DL (ref 0.5–1.4)
GLUCOSE SERPL-MCNC: 80 MG/DL (ref 65–99)
POTASSIUM SERPL-SCNC: 3.8 MMOL/L (ref 3.6–5.5)
SODIUM SERPL-SCNC: 135 MMOL/L (ref 135–145)

## 2020-01-21 PROCEDURE — 84480 ASSAY TRIIODOTHYRONINE (T3): CPT

## 2020-01-21 PROCEDURE — 84481 FREE ASSAY (FT-3): CPT

## 2020-01-21 PROCEDURE — 84443 ASSAY THYROID STIM HORMONE: CPT

## 2020-01-21 PROCEDURE — 82784 ASSAY IGA/IGD/IGG/IGM EACH: CPT

## 2020-01-21 PROCEDURE — 80048 BASIC METABOLIC PNL TOTAL CA: CPT

## 2020-01-21 PROCEDURE — 83516 IMMUNOASSAY NONANTIBODY: CPT

## 2020-01-21 PROCEDURE — 84439 ASSAY OF FREE THYROXINE: CPT

## 2020-01-21 PROCEDURE — 36415 COLL VENOUS BLD VENIPUNCTURE: CPT

## 2020-01-21 PROCEDURE — 82306 VITAMIN D 25 HYDROXY: CPT

## 2020-01-21 PROCEDURE — 82607 VITAMIN B-12: CPT

## 2020-01-22 LAB
25(OH)D3 SERPL-MCNC: 119 NG/ML (ref 30–100)
T3 SERPL-MCNC: 162.2 NG/DL (ref 60–181)
T3FREE SERPL-MCNC: 3.27 PG/ML (ref 2.4–4.2)
T4 FREE SERPL-MCNC: 1.11 NG/DL (ref 0.53–1.43)
TSH SERPL DL<=0.005 MIU/L-ACNC: 4.1 UIU/ML (ref 0.38–5.33)
VIT B12 SERPL-MCNC: 342 PG/ML (ref 211–911)

## 2020-01-23 LAB
IGA SERPL-MCNC: 160 MG/DL (ref 68–408)
TTG IGA SER IA-ACNC: 0 U/ML (ref 0–3)

## 2020-01-26 DIAGNOSIS — E03.8 SUBCLINICAL HYPOTHYROIDISM: ICD-10-CM

## 2020-01-26 RX ORDER — LEVOTHYROXINE SODIUM 0.07 MG/1
75 TABLET ORAL
Qty: 90 TAB | Refills: 3 | Status: SHIPPED | OUTPATIENT
Start: 2020-01-26 | End: 2020-02-20

## 2020-01-29 ENCOUNTER — TELEPHONE (OUTPATIENT)
Dept: MEDICAL GROUP | Facility: MEDICAL CENTER | Age: 30
End: 2020-01-29

## 2020-01-29 NOTE — TELEPHONE ENCOUNTER
----- Message from EMMA David sent at 1/28/2020  9:38 PM PST -----  Please notify patient that her celiac testing was normal.     EMMA David

## 2020-02-04 NOTE — TELEPHONE ENCOUNTER
Left a message for patient to call back at [phone]. Sending letter.     Radha Fishman  [facility] Assistant

## 2020-02-20 DIAGNOSIS — E03.9 HYPOTHYROIDISM, UNSPECIFIED TYPE: ICD-10-CM

## 2020-02-20 RX ORDER — LEVOTHYROXINE SODIUM 0.07 MG/1
75 TABLET ORAL
Qty: 90 TAB | Refills: 3 | Status: SHIPPED | OUTPATIENT
Start: 2020-02-20 | End: 2020-12-11

## 2020-04-01 ENCOUNTER — OFFICE VISIT (OUTPATIENT)
Dept: ENDOCRINOLOGY | Facility: MEDICAL CENTER | Age: 30
End: 2020-04-01
Payer: COMMERCIAL

## 2020-04-01 DIAGNOSIS — E53.8 VITAMIN B 12 DEFICIENCY: ICD-10-CM

## 2020-04-01 DIAGNOSIS — E55.9 VITAMIN D DEFICIENCY: ICD-10-CM

## 2020-04-01 DIAGNOSIS — E03.9 ACQUIRED HYPOTHYROIDISM: ICD-10-CM

## 2020-04-01 DIAGNOSIS — E23.2 DIABETES INSIPIDUS (HCC): ICD-10-CM

## 2020-04-01 PROCEDURE — 99214 OFFICE O/P EST MOD 30 MIN: CPT | Mod: 95,CR | Performed by: INTERNAL MEDICINE
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
## 2020-05-11 ENCOUNTER — HOSPITAL ENCOUNTER (OUTPATIENT)
Dept: LAB | Facility: MEDICAL CENTER | Age: 30
End: 2020-05-11
Attending: INTERNAL MEDICINE
Payer: COMMERCIAL

## 2020-05-11 DIAGNOSIS — E23.2 DIABETES INSIPIDUS (HCC): ICD-10-CM

## 2020-05-11 DIAGNOSIS — E53.8 VITAMIN B 12 DEFICIENCY: ICD-10-CM

## 2020-05-11 DIAGNOSIS — E55.9 VITAMIN D DEFICIENCY: ICD-10-CM

## 2020-05-11 DIAGNOSIS — E03.9 ACQUIRED HYPOTHYROIDISM: ICD-10-CM

## 2020-05-11 LAB
25(OH)D3 SERPL-MCNC: 46 NG/ML (ref 30–100)
ANION GAP SERPL CALC-SCNC: 12 MMOL/L (ref 7–16)
BUN SERPL-MCNC: 5 MG/DL (ref 8–22)
CALCIUM SERPL-MCNC: 9.3 MG/DL (ref 8.5–10.5)
CHLORIDE SERPL-SCNC: 98 MMOL/L (ref 96–112)
CO2 SERPL-SCNC: 23 MMOL/L (ref 20–33)
CREAT SERPL-MCNC: 0.5 MG/DL (ref 0.5–1.4)
GLUCOSE SERPL-MCNC: 84 MG/DL (ref 65–99)
OSMOLALITY UR: 509 MOSM/KG H2O (ref 300–900)
POTASSIUM SERPL-SCNC: 4.4 MMOL/L (ref 3.6–5.5)
SODIUM SERPL-SCNC: 133 MMOL/L (ref 135–145)
T3FREE SERPL-MCNC: 3.72 PG/ML (ref 2.4–4.2)
T4 FREE SERPL-MCNC: 1.43 NG/DL (ref 0.93–1.7)
TSH SERPL DL<=0.005 MIU/L-ACNC: 0.2 UIU/ML (ref 0.38–5.33)
VIT B12 SERPL-MCNC: 1491 PG/ML (ref 211–911)

## 2020-05-11 PROCEDURE — 84439 ASSAY OF FREE THYROXINE: CPT

## 2020-05-11 PROCEDURE — 80048 BASIC METABOLIC PNL TOTAL CA: CPT

## 2020-05-11 PROCEDURE — 84481 FREE ASSAY (FT-3): CPT

## 2020-05-11 PROCEDURE — 83935 ASSAY OF URINE OSMOLALITY: CPT

## 2020-05-11 PROCEDURE — 36415 COLL VENOUS BLD VENIPUNCTURE: CPT

## 2020-05-11 PROCEDURE — 83930 ASSAY OF BLOOD OSMOLALITY: CPT

## 2020-05-11 PROCEDURE — 82306 VITAMIN D 25 HYDROXY: CPT

## 2020-05-11 PROCEDURE — 82607 VITAMIN B-12: CPT

## 2020-05-11 PROCEDURE — 84443 ASSAY THYROID STIM HORMONE: CPT

## 2020-05-12 LAB — OSMOLALITY SERPL: 272 MOSM/KG H2O (ref 278–298)

## 2020-07-06 DIAGNOSIS — E03.9 HYPOTHYROIDISM, UNSPECIFIED TYPE: ICD-10-CM

## 2020-07-06 DIAGNOSIS — R79.89 PATHOLOGIC HIGH SERUM PROLACTIN: ICD-10-CM

## 2020-07-06 DIAGNOSIS — E23.2 DIABETES INSIPIDUS (HCC): ICD-10-CM

## 2020-08-11 DIAGNOSIS — E55.9 VITAMIN D DEFICIENCY: ICD-10-CM

## 2020-08-11 NOTE — TELEPHONE ENCOUNTER
Received request via: Pharmacy    Was the patient seen in the last year in this department? Yes    Does the patient have an active prescription (recently filled or refills available) for medication(s) requested? No     VITAMIN D2 1.25MG(50,000 UNIT)        Will file in chart as: vitamin D, Ergocalciferol, (DRISDOL) 1.25 MG (11463 UT) Cap capsule   Sig: TAKE 1 CAPSULE BY MOUTH EVERY 7 DAYS AS DIRECTED   Disp:  12 Cap    Refills:  3   Start: 8/11/2020   Class: Normal   Non-formulary For: Vitamin D deficiency   Last ordered: 9 months ago by Sandeep Morrell M.D. Last refill: 6/8/2020   Rx #: [id number]

## 2020-08-12 RX ORDER — ERGOCALCIFEROL 1.25 MG/1
50000 CAPSULE ORAL
Qty: 6 CAP | Refills: 3 | Status: SHIPPED | OUTPATIENT
Start: 2020-08-12 | End: 2021-08-16 | Stop reason: SDUPTHER

## 2020-09-03 ENCOUNTER — TELEMEDICINE (OUTPATIENT)
Dept: MEDICAL GROUP | Facility: MEDICAL CENTER | Age: 30
End: 2020-09-03
Payer: COMMERCIAL

## 2020-09-03 VITALS — HEIGHT: 65 IN | BODY MASS INDEX: 25.79 KG/M2

## 2020-09-03 DIAGNOSIS — F51.01 PRIMARY INSOMNIA: ICD-10-CM

## 2020-09-03 PROCEDURE — 99213 OFFICE O/P EST LOW 20 MIN: CPT | Mod: 95,CR | Performed by: NURSE PRACTITIONER

## 2020-09-03 RX ORDER — TRAZODONE HYDROCHLORIDE 50 MG/1
25-100 TABLET ORAL NIGHTLY PRN
Qty: 30 TAB | Refills: 3 | Status: SHIPPED | OUTPATIENT
Start: 2020-09-03 | End: 2020-09-11
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
## 2020-09-04 NOTE — ASSESSMENT & PLAN NOTE
Chronic. Ongoing on and off for the past 4 years. Worse in the last two months. Going to bed around 2am, falls asleep around 6-7 am, sleeps until around 4-5 pm. Previously worked night shift. Has tried to get back on a night sleep schedule but was not able to.     Feel like she will jerk awake when she finally falls asleep.     Has tried exercise, getting up early, melatonin, ZzzQuil, Valerian Root. Melatonin will sometimes have the opposite effect.     Has never been on a rx medication.     Does have a history of Major depression, not treated for this.

## 2020-09-11 DIAGNOSIS — F51.01 PRIMARY INSOMNIA: ICD-10-CM

## 2020-09-11 RX ORDER — TRAZODONE HYDROCHLORIDE 50 MG/1
25-100 TABLET ORAL NIGHTLY PRN
Qty: 180 TAB | Refills: 1 | Status: SHIPPED | OUTPATIENT
Start: 2020-09-11 | End: 2021-03-12

## 2020-09-11 NOTE — TELEPHONE ENCOUNTER
Received request via: Pharmacy    Was the patient seen in the last year in this department? Yes    Does the patient have an active prescription (recently filled or refills available) for medication(s) requested? Yes. 90 day supply request, dx code needed

## 2020-12-11 ENCOUNTER — TELEMEDICINE (OUTPATIENT)
Dept: ENDOCRINOLOGY | Facility: MEDICAL CENTER | Age: 30
End: 2020-12-11
Attending: INTERNAL MEDICINE
Payer: COMMERCIAL

## 2020-12-11 DIAGNOSIS — E03.9 ACQUIRED HYPOTHYROIDISM: ICD-10-CM

## 2020-12-11 DIAGNOSIS — E23.2 DIABETES INSIPIDUS (HCC): ICD-10-CM

## 2020-12-11 DIAGNOSIS — Z79.899 HIGH RISK MEDICATION USE: ICD-10-CM

## 2020-12-11 PROCEDURE — 99214 OFFICE O/P EST MOD 30 MIN: CPT | Mod: CR | Performed by: INTERNAL MEDICINE
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
## 2020-12-22 ENCOUNTER — HOSPITAL ENCOUNTER (OUTPATIENT)
Dept: LAB | Facility: MEDICAL CENTER | Age: 30
End: 2020-12-22
Attending: INTERNAL MEDICINE
Payer: COMMERCIAL

## 2020-12-22 DIAGNOSIS — E23.2 DIABETES INSIPIDUS (HCC): ICD-10-CM

## 2020-12-22 DIAGNOSIS — E03.9 ACQUIRED HYPOTHYROIDISM: ICD-10-CM

## 2020-12-22 DIAGNOSIS — Z79.899 HIGH RISK MEDICATION USE: ICD-10-CM

## 2020-12-22 LAB
ALBUMIN SERPL BCP-MCNC: 4.1 G/DL (ref 3.2–4.9)
ALBUMIN/GLOB SERPL: 1.2 G/DL
ALP SERPL-CCNC: 51 U/L (ref 30–99)
ALT SERPL-CCNC: 21 U/L (ref 2–50)
ANION GAP SERPL CALC-SCNC: 12 MMOL/L (ref 7–16)
AST SERPL-CCNC: 19 U/L (ref 12–45)
BILIRUB SERPL-MCNC: 0.3 MG/DL (ref 0.1–1.5)
BUN SERPL-MCNC: 6 MG/DL (ref 8–22)
CALCIUM SERPL-MCNC: 9.3 MG/DL (ref 8.4–10.2)
CHLORIDE SERPL-SCNC: 102 MMOL/L (ref 96–112)
CO2 SERPL-SCNC: 21 MMOL/L (ref 20–33)
CREAT SERPL-MCNC: 0.61 MG/DL (ref 0.5–1.4)
GLOBULIN SER CALC-MCNC: 3.5 G/DL (ref 1.9–3.5)
GLUCOSE SERPL-MCNC: 86 MG/DL (ref 65–99)
OSMOLALITY SERPL: 280 MOSM/KG H2O (ref 278–298)
OSMOLALITY UR: 625 MOSM/KG H2O (ref 300–900)
POTASSIUM SERPL-SCNC: 3.9 MMOL/L (ref 3.6–5.5)
PROT SERPL-MCNC: 7.6 G/DL (ref 6–8.2)
SODIUM SERPL-SCNC: 135 MMOL/L (ref 135–145)
SODIUM UR-SCNC: 175 MMOL/L
T4 FREE SERPL-MCNC: 1.37 NG/DL (ref 0.93–1.7)
TSH SERPL DL<=0.005 MIU/L-ACNC: 1.51 UIU/ML (ref 0.38–5.33)

## 2020-12-22 PROCEDURE — 80053 COMPREHEN METABOLIC PANEL: CPT

## 2020-12-22 PROCEDURE — 84439 ASSAY OF FREE THYROXINE: CPT

## 2020-12-22 PROCEDURE — 84300 ASSAY OF URINE SODIUM: CPT

## 2020-12-22 PROCEDURE — 36415 COLL VENOUS BLD VENIPUNCTURE: CPT

## 2020-12-22 PROCEDURE — 84443 ASSAY THYROID STIM HORMONE: CPT

## 2020-12-22 PROCEDURE — 83935 ASSAY OF URINE OSMOLALITY: CPT

## 2020-12-22 PROCEDURE — 83930 ASSAY OF BLOOD OSMOLALITY: CPT

## 2020-12-22 RX ORDER — LEVOTHYROXINE SODIUM 75 MCG
75 TABLET ORAL
Qty: 30 TAB | Refills: 11 | Status: SHIPPED | OUTPATIENT
Start: 2020-12-22 | End: 2021-01-21

## 2020-12-31 DIAGNOSIS — E23.2 DIABETES INSIPIDUS (HCC): ICD-10-CM

## 2020-12-31 RX ORDER — DESMOPRESSIN ACETATE 0.1 MG/1
TABLET ORAL
Qty: 360 TAB | Refills: 3 | Status: SHIPPED | OUTPATIENT
Start: 2020-12-31 | End: 2021-02-16 | Stop reason: SDUPTHER

## 2021-01-05 ENCOUNTER — TELEPHONE (OUTPATIENT)
Dept: MEDICAL GROUP | Facility: MEDICAL CENTER | Age: 31
End: 2021-01-05

## 2021-01-05 NOTE — TELEPHONE ENCOUNTER
ESTABLISHED PATIENT PRE-VISIT PLANNING     Patient was NOT contacted to complete PVP.     Note: Patient will not be contacted if there is no indication to call.     1.  Reviewed notes from the last few office visits within the medical group: Yes    2.  If any orders were placed at last visit or intended to be done for this visit (i.e. 6 mos follow-up), do we have Results/Consult Notes?         •  Labs - Labs were not ordered at last office visit.  Note: If patient appointment is for lab review and patient did not complete labs, check with provider if OK to reschedule patient until labs completed.       •  Imaging - Imaging was not ordered at last office visit.       •  Referrals - No referrals were ordered at last office visit.    3. Is this appointment scheduled as a Hospital Follow-Up? No    4.  Immunizations were updated in Epic using Reconcile Outside Information activity? Yes    5.  Patient is due for the following Health Maintenance Topics:   Health Maintenance Due   Topic Date Due   • PAP SMEAR  03/24/2012   • IMM INFLUENZA (1) 09/01/2020       - Patient plans to schedule appointment for Immunizations: FLU.    6.  AHA (Pulse8) form printed for Provider? N/A

## 2021-01-06 ENCOUNTER — TELEMEDICINE (OUTPATIENT)
Dept: MEDICAL GROUP | Facility: MEDICAL CENTER | Age: 31
End: 2021-01-06
Payer: COMMERCIAL

## 2021-01-06 VITALS — HEIGHT: 65 IN | BODY MASS INDEX: 25.79 KG/M2

## 2021-01-06 DIAGNOSIS — L70.0 ACNE VULGARIS: ICD-10-CM

## 2021-01-06 PROCEDURE — 99213 OFFICE O/P EST LOW 20 MIN: CPT | Mod: 95,CR | Performed by: NURSE PRACTITIONER

## 2021-01-06 NOTE — ASSESSMENT & PLAN NOTE
Patient notes that she has started having increased acne on her chin and cleavage. This only happened during puberty and right before her period, however now she is on birth control and doesn't get periods currently.     She is concerned that she has a hormone imbalance.
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
## 2021-01-12 ENCOUNTER — HOSPITAL ENCOUNTER (OUTPATIENT)
Dept: LAB | Facility: MEDICAL CENTER | Age: 31
End: 2021-01-12
Attending: INTERNAL MEDICINE
Payer: COMMERCIAL

## 2021-01-12 DIAGNOSIS — E23.2 DIABETES INSIPIDUS (HCC): ICD-10-CM

## 2021-01-12 DIAGNOSIS — Z79.899 HIGH RISK MEDICATION USE: ICD-10-CM

## 2021-01-12 DIAGNOSIS — E03.9 ACQUIRED HYPOTHYROIDISM: ICD-10-CM

## 2021-01-12 PROCEDURE — 84443 ASSAY THYROID STIM HORMONE: CPT

## 2021-01-12 PROCEDURE — 80053 COMPREHEN METABOLIC PANEL: CPT

## 2021-01-12 PROCEDURE — 83930 ASSAY OF BLOOD OSMOLALITY: CPT

## 2021-01-12 PROCEDURE — 84439 ASSAY OF FREE THYROXINE: CPT

## 2021-01-12 PROCEDURE — 36415 COLL VENOUS BLD VENIPUNCTURE: CPT

## 2021-01-13 LAB
ALBUMIN SERPL BCP-MCNC: 4.1 G/DL (ref 3.2–4.9)
ALBUMIN/GLOB SERPL: 1.2 G/DL
ALP SERPL-CCNC: 46 U/L (ref 30–99)
ALT SERPL-CCNC: 27 U/L (ref 2–50)
ANION GAP SERPL CALC-SCNC: 9 MMOL/L (ref 7–16)
AST SERPL-CCNC: 21 U/L (ref 12–45)
BILIRUB SERPL-MCNC: 0.3 MG/DL (ref 0.1–1.5)
BUN SERPL-MCNC: 5 MG/DL (ref 8–22)
CALCIUM SERPL-MCNC: 9.2 MG/DL (ref 8.5–10.5)
CHLORIDE SERPL-SCNC: 101 MMOL/L (ref 96–112)
CO2 SERPL-SCNC: 23 MMOL/L (ref 20–33)
CREAT SERPL-MCNC: 0.69 MG/DL (ref 0.5–1.4)
GLOBULIN SER CALC-MCNC: 3.3 G/DL (ref 1.9–3.5)
GLUCOSE SERPL-MCNC: 84 MG/DL (ref 65–99)
OSMOLALITY SERPL: 283 MOSM/KG H2O (ref 278–298)
POTASSIUM SERPL-SCNC: 4.1 MMOL/L (ref 3.6–5.5)
PROT SERPL-MCNC: 7.4 G/DL (ref 6–8.2)
SODIUM SERPL-SCNC: 133 MMOL/L (ref 135–145)
T4 FREE SERPL-MCNC: 1.3 NG/DL (ref 0.93–1.7)
TSH SERPL DL<=0.005 MIU/L-ACNC: 2.69 UIU/ML (ref 0.38–5.33)

## 2021-01-15 ENCOUNTER — TELEPHONE (OUTPATIENT)
Dept: ENDOCRINOLOGY | Facility: MEDICAL CENTER | Age: 31
End: 2021-01-15

## 2021-01-15 NOTE — TELEPHONE ENCOUNTER
----- Message from Enrique Leo M.D. sent at 1/14/2021  1:09 PM PST -----  Please contact patient to make her an appointment in 4 months

## 2021-01-15 NOTE — TELEPHONE ENCOUNTER
Phone Number Called: [phone]    Call outcome: Left detailed message for patient. Informed to call back with any additional questions.    Message: Contacted patient and I let her know that Dr. Leo would like for her to schedule and appointment to go over labs. I asked patient to call us back.

## 2021-02-02 ENCOUNTER — TELEPHONE (OUTPATIENT)
Dept: MEDICAL GROUP | Facility: MEDICAL CENTER | Age: 31
End: 2021-02-02

## 2021-02-02 NOTE — TELEPHONE ENCOUNTER
ESTABLISHED PATIENT PRE-VISIT PLANNING     Patient was NOT contacted to complete PVP.     Note: Patient will not be contacted if there is no indication to call.     1.  Reviewed notes from the last few office visits within the medical group: Yes    2.  If any orders were placed at last visit or intended to be done for this visit (i.e. 6 mos follow-up), do we have Results/Consult Notes?         •  Labs - Labs were not ordered at last office visit.  Note: If patient appointment is for lab review and patient did not complete labs, check with provider if OK to reschedule patient until labs completed.       •  Imaging - Imaging was not ordered at last office visit.       •  Referrals - No referrals were ordered at last office visit.    3. Is this appointment scheduled as a Hospital Follow-Up? No    4.  Immunizations were updated in Epic using Reconcile Outside Information activity? Yes    5.  Patient is due for the following Health Maintenance Topics:   Health Maintenance Due   Topic Date Due   • PAP SMEAR  03/24/2012   • IMM INFLUENZA (1) 09/01/2020       6.  AHA (Pulse8) form printed for Provider? N/A

## 2021-02-08 ENCOUNTER — APPOINTMENT (OUTPATIENT)
Dept: MEDICAL GROUP | Facility: MEDICAL CENTER | Age: 31
End: 2021-02-08
Payer: COMMERCIAL

## 2021-02-11 ENCOUNTER — TELEPHONE (OUTPATIENT)
Dept: MEDICAL GROUP | Facility: MEDICAL CENTER | Age: 31
End: 2021-02-11

## 2021-02-16 DIAGNOSIS — E23.2 DIABETES INSIPIDUS (HCC): ICD-10-CM

## 2021-02-16 RX ORDER — DESMOPRESSIN ACETATE 0.1 MG/1
0.1 TABLET ORAL
Qty: 90 TABLET | Refills: 2 | Status: SHIPPED | OUTPATIENT
Start: 2021-02-16 | End: 2021-03-23 | Stop reason: SDUPTHER

## 2021-02-19 ENCOUNTER — OFFICE VISIT (OUTPATIENT)
Dept: MEDICAL GROUP | Facility: MEDICAL CENTER | Age: 31
End: 2021-02-19
Payer: COMMERCIAL

## 2021-02-19 VITALS
SYSTOLIC BLOOD PRESSURE: 112 MMHG | BODY MASS INDEX: 25.83 KG/M2 | HEIGHT: 65 IN | WEIGHT: 155 LBS | DIASTOLIC BLOOD PRESSURE: 72 MMHG | HEART RATE: 72 BPM | TEMPERATURE: 98.2 F | OXYGEN SATURATION: 96 %

## 2021-02-19 DIAGNOSIS — Z23 NEED FOR VACCINATION: ICD-10-CM

## 2021-02-19 DIAGNOSIS — H93.8X2 EAR FULLNESS, LEFT: ICD-10-CM

## 2021-02-19 DIAGNOSIS — S03.00XS DISLOCATION OF TEMPOROMANDIBULAR JOINT, SEQUELA: ICD-10-CM

## 2021-02-19 PROBLEM — S03.00XA TMJ (DISLOCATION OF TEMPOROMANDIBULAR JOINT): Status: ACTIVE | Noted: 2021-02-19

## 2021-02-19 PROCEDURE — 90471 IMMUNIZATION ADMIN: CPT | Performed by: NURSE PRACTITIONER

## 2021-02-19 PROCEDURE — 99214 OFFICE O/P EST MOD 30 MIN: CPT | Mod: 25 | Performed by: NURSE PRACTITIONER

## 2021-02-19 PROCEDURE — 90651 9VHPV VACCINE 2/3 DOSE IM: CPT | Performed by: NURSE PRACTITIONER

## 2021-02-19 ASSESSMENT — PATIENT HEALTH QUESTIONNAIRE - PHQ9
7. TROUBLE CONCENTRATING ON THINGS, SUCH AS READING THE NEWSPAPER OR WATCHING TELEVISION: MORE THAN HALF THE DAYS
9. THOUGHTS THAT YOU WOULD BE BETTER OFF DEAD, OR OF HURTING YOURSELF: SEVERAL DAYS
2. FEELING DOWN, DEPRESSED, IRRITABLE, OR HOPELESS: NEARLY EVERY DAY
3. TROUBLE FALLING OR STAYING ASLEEP OR SLEEPING TOO MUCH: NEARLY EVERY DAY
SUM OF ALL RESPONSES TO PHQ QUESTIONS 1-9: 19
6. FEELING BAD ABOUT YOURSELF - OR THAT YOU ARE A FAILURE OR HAVE LET YOURSELF OR YOUR FAMILY DOWN: NEARLY EVERY DAY
5. POOR APPETITE OR OVEREATING: NEARLY EVERY DAY
8. MOVING OR SPEAKING SO SLOWLY THAT OTHER PEOPLE COULD HAVE NOTICED. OR THE OPPOSITE, BEING SO FIGETY OR RESTLESS THAT YOU HAVE BEEN MOVING AROUND A LOT MORE THAN USUAL: NOT AT ALL
4. FEELING TIRED OR HAVING LITTLE ENERGY: NEARLY EVERY DAY
SUM OF ALL RESPONSES TO PHQ9 QUESTIONS 1 AND 2: 4
1. LITTLE INTEREST OR PLEASURE IN DOING THINGS: SEVERAL DAYS

## 2021-02-19 NOTE — PROGRESS NOTES
Subjective:   Mary Carmen Head is a 30 y.o. female here today for the following concern:    Ear fullness, left  Ongoing for a month or more. Feeling plugged when eating. Uncomfortable at night. Occasional pain, mild. No drainage.     Does have a history of ear injury with probably tympanic membrane rupture and bleeding.     No decreased hearing. Does get occasional ringing in both ears.     Has chronic sinus issues.     Does have some TMJ issues and tenderness of jaw bilaterally.          Current medicines (including changes today)  Current Outpatient Medications   Medication Sig Dispense Refill   • desmopressin (DDAVP) 0.1 MG Tab Take 1 tablet by mouth every bedtime. 90 tablet 2   • LORYNA 3-0.02 MG per tablet TAKE 1 TABLET BY MOUTH EVERY DAY 84 Tab 3   • traZODone (DESYREL) 50 MG Tab TAKE 0.5-2 TABS BY MOUTH AT BEDTIME AS NEEDED FOR SLEEP. 180 Tab 1   • vitamin D, Ergocalciferol, (DRISDOL) 1.25 MG (38094 UT) Cap capsule Take 1 Cap by mouth every 14 days. Please provide D3 only and not D2. 6 Cap 3   • GARLIC PO Take  by mouth.     • Capsicum, Cayenne, (CAYENNE PO) Take  by mouth.     • ascorbic acid (ASCORBIC ACID) 500 MG Tab Take 500 mg by mouth every day.     • Multiple Vitamins-Minerals (OCUVITE-LUTEIN) Tab Take 1 tablet by mouth every day.     • Multiple Vitamins-Minerals (HAIR/SKIN/NAILS) Tab Take 1 Tab by mouth every day.     • coenzyme Q-10 30 MG capsule Take 60 mg by mouth every day.     • B Complex-Biotin-FA (VITAMIN B50 COMPLEX PO) Take 1 Tab by mouth every day.     • CALCIUM PO Take 1 Tab by mouth every day.     • Probiotic Product (PROBIOTIC PO) Take 1 Cap by mouth every day.       No current facility-administered medications for this visit.     She  has a past medical history of Diabetes insipidus (HCC), PMDD (premenstrual dysphoric disorder), and Vitamin D deficiency (12/12/2018).    ROS   No chest pain, no shortness of breath, no abdominal pain  Positive ROS as per HPI.  All other systems  "reviewed and are negative.     Objective:     /72 (BP Location: Right arm, Patient Position: Sitting, BP Cuff Size: Adult)   Pulse 72   Temp 36.8 °C (98.2 °F) (Temporal)   Ht 1.651 m (5' 5\")   Wt 70.3 kg (155 lb)   SpO2 96%  Body mass index is 25.79 kg/m².     Physical Exam:  Constitutional: Alert, no distress.  Skin: Warm, dry, good turgor, no rashes in visible areas.  Eye: Equal, round and reactive, conjunctiva clear, lids normal.  ENMT: Lips without lesions, good dentition, oropharynx clear. +mild erythema of left TM, no effusion noted but some decreased light reflex.  Neck: Trachea midline, no masses, no thyromegaly. No cervical or supraclavicular lymphadenopathy  Respiratory: Unlabored respiratory effort  Psych: Alert and oriented x3, normal affect and mood.        Assessment and Plan:   The following treatment plan was discussed    1. Ear fullness, left  Unstable  Discussed trial of oral antibiotic and or flonase  Patient wants to hold off for now as we discussed it may be due to her worsening TMJ  She will follow up with TMJ clinic and notify me if she would like to try an abx.     2. Dislocation of temporomandibular joint, sequela  Unstable  Follow up with TMJ clinic  - REFERRAL TO TMJ PAIN CLINIC    3. Need for vaccination  - 9VHPV Vaccine 2-3 Dose IM      Followup: Return if symptoms worsen or fail to improve.    I have placed the below orders and discussed them with an approved delegating provider. The MA is performing the below orders under the direction of Dr. Mack           "

## 2021-02-19 NOTE — ASSESSMENT & PLAN NOTE
Ongoing for a month or more. Feeling plugged when eating. Uncomfortable at night. Occasional pain, mild. No drainage.     Does have a history of ear injury with probably tympanic membrane rupture and bleeding.     No decreased hearing. Does get occasional ringing in both ears.     Has chronic sinus issues.     Does have some TMJ issues and tenderness of jaw bilaterally.

## 2021-03-08 DIAGNOSIS — F51.01 PRIMARY INSOMNIA: ICD-10-CM

## 2021-03-12 RX ORDER — TRAZODONE HYDROCHLORIDE 50 MG/1
TABLET ORAL
Qty: 180 TABLET | Refills: 1 | Status: SHIPPED | OUTPATIENT
Start: 2021-03-12 | End: 2021-10-07

## 2021-03-17 ENCOUNTER — HOSPITAL ENCOUNTER (EMERGENCY)
Facility: MEDICAL CENTER | Age: 31
End: 2021-03-17
Attending: EMERGENCY MEDICINE
Payer: COMMERCIAL

## 2021-03-17 ENCOUNTER — APPOINTMENT (OUTPATIENT)
Dept: RADIOLOGY | Facility: MEDICAL CENTER | Age: 31
End: 2021-03-17
Attending: EMERGENCY MEDICINE
Payer: COMMERCIAL

## 2021-03-17 VITALS
HEART RATE: 87 BPM | RESPIRATION RATE: 16 BRPM | SYSTOLIC BLOOD PRESSURE: 120 MMHG | BODY MASS INDEX: 26.08 KG/M2 | DIASTOLIC BLOOD PRESSURE: 60 MMHG | TEMPERATURE: 97.3 F | HEIGHT: 65 IN | OXYGEN SATURATION: 96 % | WEIGHT: 156.53 LBS

## 2021-03-17 DIAGNOSIS — S03.00XA DISLOCATION OF TEMPOROMANDIBULAR JOINT, INITIAL ENCOUNTER: ICD-10-CM

## 2021-03-17 PROCEDURE — 700111 HCHG RX REV CODE 636 W/ 250 OVERRIDE (IP): Performed by: EMERGENCY MEDICINE

## 2021-03-17 PROCEDURE — 70355 PANORAMIC X-RAY OF JAWS: CPT

## 2021-03-17 PROCEDURE — 99283 EMERGENCY DEPT VISIT LOW MDM: CPT

## 2021-03-17 PROCEDURE — 21480 CLTX TMPRMAND DISLC 1ST/SBSQ: CPT

## 2021-03-17 RX ADMIN — FENTANYL CITRATE 100 MCG: 50 INJECTION, SOLUTION INTRAMUSCULAR; INTRAVENOUS at 08:24

## 2021-03-17 NOTE — ED TRIAGE NOTES
"Chief Complaint   Patient presents with   • Jaw Pain     Pts jaw has been stuck open for over 2 hours. Pt has hx of TMJ. Pt is protecting airway. Pt took trazadone a few hours.      /60   Pulse 69   Temp 36.9 °C (98.4 °F) (Temporal)   Resp 16   Ht 1.651 m (5' 5\")   Wt 71 kg (156 lb 8.4 oz)   SpO2 98%   BMI 26.05 kg/m²     Patient arrived to ED for the above complaint. Triage process explained to patient. Patient placed in lobby and told to notify staff of any changes.   "

## 2021-03-17 NOTE — ED PROVIDER NOTES
ED Provider Note    CHIEF COMPLAINT  Chief Complaint   Patient presents with   • Jaw Pain     Pts jaw has been stuck open for over 2 hours. Pt has hx of TMJ. Pt is protecting airway. Pt took trazadone a few hours.        HPI  Mary Carmen Head is a 30 y.o. female who presents with jaw pain.  The patient states she is had this over the last 2 hours.  She states she has a history of with jaw pain.  The patient states she has a history of TMJ dislocation and discomfort.  She did take some trazodone prior to arrival which is helped with the discomfort.  She states it is still severe and described as cramping and sharp in the TMJ region.  She has not had any associated fevers.  She denies difficulty breathing or swallowing.    REVIEW OF SYSTEMS  See HPI for further details. All other systems are negative.     PAST MEDICAL HISTORY  Past Medical History:   Diagnosis Date   • Vitamin D deficiency 12/12/2018   • Diabetes insipidus (HCC)    • PMDD (premenstrual dysphoric disorder)        FAMILY HISTORY  [unfilled]    SOCIAL HISTORY  Social History     Socioeconomic History   • Marital status: Single     Spouse name: Not on file   • Number of children: Not on file   • Years of education: Not on file   • Highest education level: Not on file   Occupational History   • Not on file   Tobacco Use   • Smoking status: Former Smoker   • Smokeless tobacco: Never Used   Substance and Sexual Activity   • Alcohol use: No   • Drug use: No   • Sexual activity: Not Currently   Other Topics Concern   • Not on file   Social History Narrative   • Not on file     Social Determinants of Health     Financial Resource Strain:    • Difficulty of Paying Living Expenses:    Food Insecurity:    • Worried About Running Out of Food in the Last Year:    • Ran Out of Food in the Last Year:    Transportation Needs:    • Lack of Transportation (Medical):    • Lack of Transportation (Non-Medical):    Physical Activity:    • Days of Exercise per Week:   "  • Minutes of Exercise per Session:    Stress:    • Feeling of Stress :    Social Connections:    • Frequency of Communication with Friends and Family:    • Frequency of Social Gatherings with Friends and Family:    • Attends Mandaen Services:    • Active Member of Clubs or Organizations:    • Attends Club or Organization Meetings:    • Marital Status:    Intimate Partner Violence:    • Fear of Current or Ex-Partner:    • Emotionally Abused:    • Physically Abused:    • Sexually Abused:        SURGICAL HISTORY  No past surgical history on file.    CURRENT MEDICATIONS  Home Medications     Reviewed by Nieves Fallon R.N. (Registered Nurse) on 03/17/21 at 0738  Med List Status: Not Addressed   Medication Last Dose Status   ascorbic acid (ASCORBIC ACID) 500 MG Tab  Active   B Complex-Biotin-FA (VITAMIN B50 COMPLEX PO)  Active   CALCIUM PO  Active   Capsicum, Cayenne, (CAYENNE PO)  Active   coenzyme Q-10 30 MG capsule  Active   desmopressin (DDAVP) 0.1 MG Tab  Active   GARLIC PO  Active   LORYNA 3-0.02 MG per tablet  Active   Multiple Vitamins-Minerals (HAIR/SKIN/NAILS) Tab  Active   Multiple Vitamins-Minerals (OCUVITE-LUTEIN) Tab  Active   Probiotic Product (PROBIOTIC PO)  Active   traZODone (DESYREL) 50 MG Tab  Active   vitamin D, Ergocalciferol, (DRISDOL) 1.25 MG (61902 UT) Cap capsule  Active                ALLERGIES  Allergies   Allergen Reactions   • Claritin      Heart palpitations    • Epinephrine        PHYSICAL EXAM  VITAL SIGNS: /60   Pulse 69   Temp 36.9 °C (98.4 °F) (Temporal)   Resp 16   Ht 1.651 m (5' 5\")   Wt 71 kg (156 lb 8.4 oz)   SpO2 98%   BMI 26.05 kg/m²       Constitutional: in acute distress, Non-toxic appearance.   HENT: Normocephalic, Atraumatic, Bilateral external ears normal, the patient's mandible is locked in the open position with TMJ tenderness and apparent step-off at the TMJ joint   eyes: PERRLA, EOMI, Conjunctiva normal, No discharge.   Neck: Normal range of motion, " No tenderness, Supple, No stridor.   Lymphatic: No lymphadenopathy noted.   Cardiovascular: Normal heart rate, Normal rhythm, No murmurs, No rubs, No gallops.   Thorax & Lungs: Normal breath sounds, No respiratory distress, No wheezing, No chest tenderness.   Abdomen: Bowel sounds normal, Soft, No tenderness, No masses, No pulsatile masses.   Skin: Warm, Dry, No erythema, No rash.   Back: No tenderness, No CVA tenderness.   Extremities: Intact distal pulses, No edema, No tenderness, No cyanosis, No clubbing.   Neurologic: Alert & oriented x 3, Normal motor function, Normal sensory function, No focal deficits noted.   Psychiatric: Affect normal, Judgment normal, Mood normal.     RADIOLOGY/  [id number]   Final Result      Temporomandibular joint dislocation versus open mouth position.      No acute fracture.        PROCEDURES TMJ dislocation reduction  The patient received intranasal fentanyl.  Subsequent was able to apply gentle downward pressure on the mandible over the molars and I was able to reduce the TMJ dislocation without any difficulty.    COURSE & MEDICAL DECISION MAKING  Pertinent Labs & Imaging studies reviewed. (See chart for details)  This a 30-year-old female who presents the emergency department with a TMJ dislocation.  The patient was reduced without any difficulty.  She states she is had this happen once in the past and most the pain resolved after about 6 hours.  She will make sure she does not open her mouth wide and she will stick with clear liquids for the first 12 hours.  She will return for any further difficulty.    FINAL IMPRESSION  1.  TMJ dislocation  2.  TMJ reduction    Disposition  The patient will be discharged in stable condition         Electronically signed by: Mahesh Najera M.D., 3/17/2021 8:10 AM

## 2021-03-19 ENCOUNTER — APPOINTMENT (OUTPATIENT)
Dept: MEDICAL GROUP | Facility: MEDICAL CENTER | Age: 31
End: 2021-03-19
Payer: COMMERCIAL

## 2021-03-22 ENCOUNTER — NON-PROVIDER VISIT (OUTPATIENT)
Dept: MEDICAL GROUP | Facility: MEDICAL CENTER | Age: 31
End: 2021-03-22
Payer: COMMERCIAL

## 2021-03-22 DIAGNOSIS — Z23 NEED FOR VACCINATION: ICD-10-CM

## 2021-03-22 PROCEDURE — 90651 9VHPV VACCINE 2/3 DOSE IM: CPT | Performed by: NURSE PRACTITIONER

## 2021-03-22 PROCEDURE — 90471 IMMUNIZATION ADMIN: CPT | Performed by: NURSE PRACTITIONER

## 2021-03-22 NOTE — PROGRESS NOTES
"Mary Carmen Head is a 30 y.o. female here for a non-provider visit for:   HPV 2 of 3    Reason for immunization: continue or complete series started at the office  Immunization records indicate need for vaccine: Yes, confirmed with Epic  Minimum interval has been met for this vaccine: Yes  ABN completed: Not Indicated    Order and dose verified by: [facility]  VIS Dated  10/30/2019 was given to patient: Yes  All IAC Questionnaire questions were answered \"No.\"    Patient tolerated injection and no adverse effects were observed or reported: Yes    Pt scheduled for next dose in series: Yes  "

## 2021-03-23 ENCOUNTER — HOSPITAL ENCOUNTER (OUTPATIENT)
Dept: LAB | Facility: MEDICAL CENTER | Age: 31
End: 2021-03-23
Attending: INTERNAL MEDICINE
Payer: COMMERCIAL

## 2021-03-23 ENCOUNTER — OFFICE VISIT (OUTPATIENT)
Dept: ENDOCRINOLOGY | Facility: MEDICAL CENTER | Age: 31
End: 2021-03-23
Attending: INTERNAL MEDICINE
Payer: COMMERCIAL

## 2021-03-23 VITALS
DIASTOLIC BLOOD PRESSURE: 66 MMHG | OXYGEN SATURATION: 97 % | HEART RATE: 69 BPM | HEIGHT: 65 IN | BODY MASS INDEX: 25.86 KG/M2 | WEIGHT: 155.2 LBS | SYSTOLIC BLOOD PRESSURE: 106 MMHG | RESPIRATION RATE: 16 BRPM

## 2021-03-23 DIAGNOSIS — E23.2 DIABETES INSIPIDUS (HCC): ICD-10-CM

## 2021-03-23 DIAGNOSIS — E03.9 ACQUIRED HYPOTHYROIDISM: ICD-10-CM

## 2021-03-23 DIAGNOSIS — Z79.899 HIGH RISK MEDICATION USE: ICD-10-CM

## 2021-03-23 DIAGNOSIS — E55.9 VITAMIN D DEFICIENCY: ICD-10-CM

## 2021-03-23 LAB
ALBUMIN SERPL BCP-MCNC: 4.2 G/DL (ref 3.2–4.9)
ALBUMIN/GLOB SERPL: 1.1 G/DL
ALP SERPL-CCNC: 46 U/L (ref 30–99)
ALT SERPL-CCNC: 22 U/L (ref 2–50)
ANION GAP SERPL CALC-SCNC: 13 MMOL/L (ref 7–16)
AST SERPL-CCNC: 22 U/L (ref 12–45)
BILIRUB SERPL-MCNC: 0.3 MG/DL (ref 0.1–1.5)
BUN SERPL-MCNC: 5 MG/DL (ref 8–22)
CALCIUM SERPL-MCNC: 9.4 MG/DL (ref 8.4–10.2)
CHLORIDE SERPL-SCNC: 103 MMOL/L (ref 96–112)
CO2 SERPL-SCNC: 22 MMOL/L (ref 20–33)
CREAT SERPL-MCNC: 0.63 MG/DL (ref 0.5–1.4)
GLOBULIN SER CALC-MCNC: 3.7 G/DL (ref 1.9–3.5)
GLUCOSE SERPL-MCNC: 90 MG/DL (ref 65–99)
POTASSIUM SERPL-SCNC: 4 MMOL/L (ref 3.6–5.5)
PROT SERPL-MCNC: 7.9 G/DL (ref 6–8.2)
SODIUM SERPL-SCNC: 138 MMOL/L (ref 135–145)
T4 FREE SERPL-MCNC: 1.35 NG/DL (ref 0.93–1.7)
TSH SERPL DL<=0.005 MIU/L-ACNC: 1.26 UIU/ML (ref 0.38–5.33)

## 2021-03-23 PROCEDURE — 84443 ASSAY THYROID STIM HORMONE: CPT

## 2021-03-23 PROCEDURE — 83930 ASSAY OF BLOOD OSMOLALITY: CPT

## 2021-03-23 PROCEDURE — 82306 VITAMIN D 25 HYDROXY: CPT

## 2021-03-23 PROCEDURE — 83935 ASSAY OF URINE OSMOLALITY: CPT

## 2021-03-23 PROCEDURE — 99211 OFF/OP EST MAY X REQ PHY/QHP: CPT | Performed by: INTERNAL MEDICINE

## 2021-03-23 PROCEDURE — 36415 COLL VENOUS BLD VENIPUNCTURE: CPT

## 2021-03-23 PROCEDURE — 80053 COMPREHEN METABOLIC PANEL: CPT

## 2021-03-23 PROCEDURE — 84439 ASSAY OF FREE THYROXINE: CPT

## 2021-03-23 PROCEDURE — 99214 OFFICE O/P EST MOD 30 MIN: CPT | Performed by: INTERNAL MEDICINE

## 2021-03-23 RX ORDER — CHOLECALCIFEROL (VITAMIN D3) 1250 MCG
CAPSULE ORAL
COMMUNITY
Start: 2021-02-16 | End: 2021-03-23

## 2021-03-23 RX ORDER — DESMOPRESSIN ACETATE 0.1 MG/1
0.15 TABLET ORAL
Qty: 135 TABLET | Refills: 2 | Status: SHIPPED | OUTPATIENT
Start: 2021-03-23 | End: 2022-01-03

## 2021-03-23 RX ORDER — LEVOTHYROXINE SODIUM 75 MCG
TABLET ORAL
COMMUNITY
Start: 2021-02-26 | End: 2021-03-23 | Stop reason: SDUPTHER

## 2021-03-23 RX ORDER — LEVOTHYROXINE SODIUM 75 MCG
75 TABLET ORAL
Qty: 90 TABLET | Refills: 2 | Status: SHIPPED | OUTPATIENT
Start: 2021-03-23 | End: 2022-04-06

## 2021-03-24 LAB
25(OH)D3 SERPL-MCNC: 41 NG/ML (ref 30–100)
OSMOLALITY SERPL: 284 MOSM/KG H2O (ref 278–298)
OSMOLALITY UR: 254 MOSM/KG H2O (ref 300–900)
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
## 2021-06-21 ENCOUNTER — APPOINTMENT (OUTPATIENT)
Dept: RADIOLOGY | Facility: IMAGING CENTER | Age: 31
End: 2021-06-21
Attending: PHYSICIAN ASSISTANT
Payer: COMMERCIAL

## 2021-06-21 ENCOUNTER — OFFICE VISIT (OUTPATIENT)
Dept: URGENT CARE | Facility: CLINIC | Age: 31
End: 2021-06-21
Payer: COMMERCIAL

## 2021-06-21 VITALS
RESPIRATION RATE: 16 BRPM | HEIGHT: 65 IN | SYSTOLIC BLOOD PRESSURE: 120 MMHG | BODY MASS INDEX: 26.66 KG/M2 | TEMPERATURE: 97.3 F | WEIGHT: 160 LBS | DIASTOLIC BLOOD PRESSURE: 82 MMHG | OXYGEN SATURATION: 98 % | HEART RATE: 70 BPM

## 2021-06-21 DIAGNOSIS — M25.512 ACUTE PAIN OF LEFT SHOULDER: ICD-10-CM

## 2021-06-21 PROCEDURE — 99214 OFFICE O/P EST MOD 30 MIN: CPT | Performed by: PHYSICIAN ASSISTANT

## 2021-06-22 NOTE — PROGRESS NOTES
Subjective:   Mary Carmen Head is a 31 y.o. female who presents today with   Chief Complaint   Patient presents with   • Shoulder Pain     (L) stood up too fast in the fridge, freezer door came and hit her on the shoulder, x2weeks pain and difficulty using hand       Shoulder Injury   The incident occurred at home. The left shoulder is affected. The incident occurred more than 1 week ago. The injury mechanism was a direct blow. The quality of the pain is described as shooting and aching. The pain is moderate. She has tried acetaminophen, NSAIDs and ice for the symptoms. The treatment provided mild relief.     Patient stood up too quickly hitting her left shoulder on the freezer door.  PMH:  has a past medical history of Diabetes insipidus (HCC), PMDD (premenstrual dysphoric disorder), and Vitamin D deficiency (12/12/2018).  MEDS:   Current Outpatient Medications:   •  SYNTHROID 75 MCG Tab, Take 1 tablet by mouth Every morning on an empty stomach., Disp: 90 tablet, Rfl: 2  •  desmopressin (DDAVP) 0.1 MG Tab, Take 1.5 Tablets by mouth every bedtime., Disp: 135 tablet, Rfl: 2  •  traZODone (DESYREL) 50 MG Tab, TAKE 0.5-2 TABS BY MOUTH AT BEDTIME AS NEEDED FOR SLEEP. *F51*, Disp: 180 tablet, Rfl: 1  •  LORYNA 3-0.02 MG per tablet, TAKE 1 TABLET BY MOUTH EVERY DAY, Disp: 84 Tab, Rfl: 3  •  vitamin D, Ergocalciferol, (DRISDOL) 1.25 MG (44339 UT) Cap capsule, Take 1 Cap by mouth every 14 days. Please provide D3 only and not D2., Disp: 6 Cap, Rfl: 3  •  GARLIC PO, Take  by mouth., Disp: , Rfl:   •  Capsicum, Cayenne, (CAYENNE PO), Take  by mouth., Disp: , Rfl:   •  ascorbic acid (ASCORBIC ACID) 500 MG Tab, Take 500 mg by mouth every day., Disp: , Rfl:   •  Multiple Vitamins-Minerals (OCUVITE-LUTEIN) Tab, Take 1 tablet by mouth every day., Disp: , Rfl:   •  Multiple Vitamins-Minerals (HAIR/SKIN/NAILS) Tab, Take 1 Tab by mouth every day., Disp: , Rfl:   •  coenzyme Q-10 30 MG capsule, Take 60 mg by mouth every  "day., Disp: , Rfl:   •  B Complex-Biotin-FA (VITAMIN B50 COMPLEX PO), Take 1 Tab by mouth every day., Disp: , Rfl:   •  CALCIUM PO, Take 1 Tab by mouth every day., Disp: , Rfl:   •  Probiotic Product (PROBIOTIC PO), Take 1 Cap by mouth every day., Disp: , Rfl:   ALLERGIES:   Allergies   Allergen Reactions   • Claritin      Heart palpitations    • Epinephrine      SURGHX: History reviewed. No pertinent surgical history.  SOCHX:  reports that she has quit smoking. She has never used smokeless tobacco. She reports that she does not drink alcohol and does not use drugs.  FH: Reviewed with patient, not pertinent to this visit.       Review of Systems   Musculoskeletal:        Left shoulder injury        Objective:   /82 (BP Location: Right arm, Patient Position: Sitting, BP Cuff Size: Adult long)   Pulse 70   Temp 36.3 °C (97.3 °F) (Temporal)   Resp 16   Ht 1.651 m (5' 5\")   Wt 72.6 kg (160 lb)   SpO2 98%   BMI 26.63 kg/m²   Physical Exam  Vitals and nursing note reviewed.   Constitutional:       General: She is not in acute distress.     Appearance: Normal appearance. She is well-developed. She is not ill-appearing or toxic-appearing.   HENT:      Head: Normocephalic and atraumatic.      Right Ear: Hearing normal.      Left Ear: Hearing normal.   Eyes:      Pupils: Pupils are equal, round, and reactive to light.   Cardiovascular:      Rate and Rhythm: Normal rate and regular rhythm.      Heart sounds: Normal heart sounds.   Pulmonary:      Effort: Pulmonary effort is normal.      Breath sounds: Normal breath sounds.   Musculoskeletal:      Comments: No step off deformity to the clavicle or shoulder on the left side.   TTP to left shoulder. Full ROM of the left upper extremity. Pain against resistance. Negative empty can test. Pain down arm and towards hand with certain movements.   Skin:     General: Skin is warm and dry.   Neurological:      Mental Status: She is alert.      Coordination: Coordination " normal.   Psychiatric:         Mood and Affect: Mood normal.         DX SHOULDER    FINDINGS:  There is no evidence of acute displaced fracture or dislocation.     No acromioclavicular joint widening.     No acute abnormality of the visualized hemithorax is noted.     No joint space narrowing or periarticular calcification.     IMPRESSION:     Negative shoulder series.                   Assessment/Plan:   Assessment    1. Acute pain of left shoulder  - DX-SHOULDER 2+ LEFT; Future  Gave patient sling to wear as needed. Not for extended periods of time. Stretching, rest, ice, tylenol. Offered PT but she declines. Likely inflammatory process causing radiculopathy.   Differential diagnosis, natural history, supportive care, and indications for immediate follow-up discussed.   Patient given instructions and understanding of medications and treatment.    If not improving in 3-5 days, F/U with PCP or return to UC if symptoms worsen.    Patient agreeable to plan.    Greater than 30 minutes were spent reviewing patient's chart, examining and obtaining history from patient, and discussing plan of care.     Please note that this dictation was created using voice recognition software. I have made every reasonable attempt to correct obvious errors, but I expect that there are errors of grammar and possibly content that I did not discover before finalizing the note.    Sebastián Harden PA-C

## 2021-08-02 ENCOUNTER — OFFICE VISIT (OUTPATIENT)
Dept: MEDICAL GROUP | Facility: MEDICAL CENTER | Age: 31
End: 2021-08-02
Payer: COMMERCIAL

## 2021-08-02 VITALS
HEIGHT: 65 IN | DIASTOLIC BLOOD PRESSURE: 74 MMHG | TEMPERATURE: 98.7 F | SYSTOLIC BLOOD PRESSURE: 124 MMHG | BODY MASS INDEX: 24.99 KG/M2 | OXYGEN SATURATION: 97 % | HEART RATE: 80 BPM | WEIGHT: 150 LBS

## 2021-08-02 DIAGNOSIS — K58.2 IRRITABLE BOWEL SYNDROME WITH BOTH CONSTIPATION AND DIARRHEA: ICD-10-CM

## 2021-08-02 PROBLEM — K31.9 STOMACH PROBLEMS: Status: ACTIVE | Noted: 2021-08-02

## 2021-08-02 PROCEDURE — 99214 OFFICE O/P EST MOD 30 MIN: CPT | Performed by: NURSE PRACTITIONER

## 2021-08-02 NOTE — ASSESSMENT & PLAN NOTE
"Ongoing for about a year with intermittent diarrhea and constipation, has been worsening.     Has had episodes of incontinence.     Will get a sensation of having to urinate.     Has been more stressed lately. Food choice doesn't seem to affect it.    Eating a lot of \"food on the go\", not necessarily fast food, more due to stress and busy schedule.     Has a sensation that her bowels are trying to prolapse.     Had tried metamucil in the past and it worsened her constipation. Tried some fiber capsules as well which did the same.     Used miralax daily for about 2 years in the past  "

## 2021-08-02 NOTE — PROGRESS NOTES
"Subjective:   Mary Carmen Head is a 31 y.o. female here today for IBS    Stomach problems  Ongoing for about a year with intermittent diarrhea and constipation, has been worsening.     Has had episodes of incontinence.     Will get a sensation of having to urinate.     Has been more stressed lately. Food choice doesn't seem to affect it.    Eating a lot of \"food on the go\", not necessarily fast food, more due to stress and busy schedule.     Has a sensation that her bowels are trying to prolapse.     Had tried metamucil in the past and it worsened her constipation. Tried some fiber capsules as well which did the same.     Used miralax daily for about 2 years in the past       Current medicines (including changes today)  Current Outpatient Medications   Medication Sig Dispense Refill   • SYNTHROID 75 MCG Tab Take 1 tablet by mouth Every morning on an empty stomach. 90 tablet 2   • desmopressin (DDAVP) 0.1 MG Tab Take 1.5 Tablets by mouth every bedtime. 135 tablet 2   • traZODone (DESYREL) 50 MG Tab TAKE 0.5-2 TABS BY MOUTH AT BEDTIME AS NEEDED FOR SLEEP. *F51* (Patient not taking: Reported on 8/2/2021) 180 tablet 1   • LORYNA 3-0.02 MG per tablet TAKE 1 TABLET BY MOUTH EVERY DAY 84 Tab 3   • vitamin D, Ergocalciferol, (DRISDOL) 1.25 MG (47119 UT) Cap capsule Take 1 Cap by mouth every 14 days. Please provide D3 only and not D2. 6 Cap 3   • GARLIC PO Take  by mouth.     • Capsicum, Cayenne, (CAYENNE PO) Take  by mouth.     • ascorbic acid (ASCORBIC ACID) 500 MG Tab Take 500 mg by mouth every day.     • Multiple Vitamins-Minerals (OCUVITE-LUTEIN) Tab Take 1 tablet by mouth every day.     • Multiple Vitamins-Minerals (HAIR/SKIN/NAILS) Tab Take 1 Tab by mouth every day.     • coenzyme Q-10 30 MG capsule Take 60 mg by mouth every day.     • B Complex-Biotin-FA (VITAMIN B50 COMPLEX PO) Take 1 Tab by mouth every day.     • CALCIUM PO Take 1 Tab by mouth every day.     • Probiotic Product (PROBIOTIC PO) Take 1 Cap " "by mouth every day.       No current facility-administered medications for this visit.     She  has a past medical history of Diabetes insipidus (HCC), PMDD (premenstrual dysphoric disorder), and Vitamin D deficiency (12/12/2018).    ROS   No chest pain, no shortness of breath, no abdominal pain  Positive ROS as per HPI.  All other systems reviewed and are negative.     Objective:     /74 (BP Location: Right arm, Patient Position: Sitting, BP Cuff Size: Adult)   Pulse 80   Temp 37.1 °C (98.7 °F) (Temporal)   Ht 1.651 m (5' 5\")   Wt 68 kg (150 lb)   SpO2 97%  Body mass index is 24.96 kg/m².     Physical Exam:  Constitutional: Alert, no distress.  Skin: Warm, dry, good turgor, no rashes in visible areas.  Eye: Equal, round and reactive, conjunctiva clear, lids normal.  ENMT: Lips without lesions, good dentition, oropharynx clear.  Neck: Trachea midline, no masses, no thyromegaly. No cervical or supraclavicular lymphadenopathy  Respiratory: Unlabored respiratory effort, lungs clear to auscultation, no wheezes, no ronchi.  Cardiovascular: Normal S1, S2, no murmur, no edema.  Abdomen: Soft, non-tender, no masses, no hepatosplenomegaly.  Psych: Alert and oriented x3, normal affect and mood.        Assessment and Plan:   The following treatment plan was discussed    1. Irritable bowel syndrome with both constipation and diarrhea  Unstable  Likely due to depression, anxiety, PTSD and recent increased stress  Advised FODMAP diet, info given on this  Trial miralax for 1 week to resolve constipation as she is likely having some overflow diarrhea as well  Follow up with GI if symptoms do not improve   - REFERRAL TO GASTROENTEROLOGY      Followup: Return if symptoms worsen or fail to improve.    I have placed the below orders and discussed them with an approved delegating provider. The MA is performing the below orders under the direction of Dr. Mack             "

## 2021-08-16 DIAGNOSIS — E55.9 VITAMIN D DEFICIENCY: ICD-10-CM

## 2021-08-16 RX ORDER — ERGOCALCIFEROL 1.25 MG/1
50000 CAPSULE ORAL
Qty: 6 CAPSULE | Refills: 3 | Status: SHIPPED | OUTPATIENT
Start: 2021-08-16 | End: 2022-08-24

## 2021-08-23 ENCOUNTER — NON-PROVIDER VISIT (OUTPATIENT)
Dept: MEDICAL GROUP | Facility: MEDICAL CENTER | Age: 31
End: 2021-08-23
Payer: COMMERCIAL

## 2021-08-23 DIAGNOSIS — Z23 NEED FOR VACCINATION: ICD-10-CM

## 2021-08-23 PROCEDURE — 90471 IMMUNIZATION ADMIN: CPT | Performed by: NURSE PRACTITIONER

## 2021-08-23 PROCEDURE — 90651 9VHPV VACCINE 2/3 DOSE IM: CPT | Performed by: NURSE PRACTITIONER

## 2021-08-23 NOTE — PROGRESS NOTES
"Mary Carmen Head is a 31 y.o. female here for a non-provider visit for:   HPV 3 of 3    Reason for immunization: continue or complete series started at the office  Immunization records indicate need for vaccine: Yes, confirmed with Epic  Minimum interval has been met for this vaccine: Yes  ABN completed: Not Indicated    VIS Dated  8/6/21 was given to patient: Yes  All IAC Questionnaire questions were answered \"No.\"    Patient tolerated injection and no adverse effects were observed or reported: Yes    Pt scheduled for next dose in series: Not Indicated  "

## 2021-09-22 ENCOUNTER — OFFICE VISIT (OUTPATIENT)
Dept: ENDOCRINOLOGY | Facility: MEDICAL CENTER | Age: 31
End: 2021-09-22
Attending: INTERNAL MEDICINE
Payer: COMMERCIAL

## 2021-09-22 ENCOUNTER — HOSPITAL ENCOUNTER (OUTPATIENT)
Dept: LAB | Facility: MEDICAL CENTER | Age: 31
End: 2021-09-22
Attending: INTERNAL MEDICINE
Payer: COMMERCIAL

## 2021-09-22 VITALS
HEIGHT: 65 IN | SYSTOLIC BLOOD PRESSURE: 120 MMHG | HEART RATE: 87 BPM | OXYGEN SATURATION: 100 % | WEIGHT: 144.9 LBS | BODY MASS INDEX: 24.14 KG/M2 | DIASTOLIC BLOOD PRESSURE: 78 MMHG

## 2021-09-22 DIAGNOSIS — Z79.899 HIGH RISK MEDICATION USE: ICD-10-CM

## 2021-09-22 DIAGNOSIS — E03.9 ACQUIRED HYPOTHYROIDISM: ICD-10-CM

## 2021-09-22 DIAGNOSIS — E23.2 DIABETES INSIPIDUS (HCC): ICD-10-CM

## 2021-09-22 DIAGNOSIS — E55.9 VITAMIN D DEFICIENCY: ICD-10-CM

## 2021-09-22 LAB
25(OH)D3 SERPL-MCNC: 53 NG/ML (ref 30–100)
ALBUMIN SERPL BCP-MCNC: 4 G/DL (ref 3.2–4.9)
ALBUMIN/GLOB SERPL: 1.1 G/DL
ALP SERPL-CCNC: 46 U/L (ref 30–99)
ALT SERPL-CCNC: 35 U/L (ref 2–50)
ANION GAP SERPL CALC-SCNC: 15 MMOL/L (ref 7–16)
AST SERPL-CCNC: 25 U/L (ref 12–45)
BILIRUB SERPL-MCNC: 0.4 MG/DL (ref 0.1–1.5)
BUN SERPL-MCNC: 6 MG/DL (ref 8–22)
CALCIUM SERPL-MCNC: 9.5 MG/DL (ref 8.4–10.2)
CHLORIDE SERPL-SCNC: 99 MMOL/L (ref 96–112)
CO2 SERPL-SCNC: 23 MMOL/L (ref 20–33)
CREAT SERPL-MCNC: 0.67 MG/DL (ref 0.5–1.4)
FASTING STATUS PATIENT QL REPORTED: NORMAL
GLOBULIN SER CALC-MCNC: 3.5 G/DL (ref 1.9–3.5)
GLUCOSE SERPL-MCNC: 89 MG/DL (ref 65–99)
OSMOLALITY UR: 132 MOSM/KG H2O (ref 300–900)
POTASSIUM SERPL-SCNC: 4.2 MMOL/L (ref 3.6–5.5)
PROT SERPL-MCNC: 7.5 G/DL (ref 6–8.2)
SODIUM SERPL-SCNC: 137 MMOL/L (ref 135–145)
T4 FREE SERPL-MCNC: 1.58 NG/DL (ref 0.93–1.7)
TSH SERPL DL<=0.005 MIU/L-ACNC: 1.15 UIU/ML (ref 0.38–5.33)

## 2021-09-22 PROCEDURE — 84439 ASSAY OF FREE THYROXINE: CPT

## 2021-09-22 PROCEDURE — 99211 OFF/OP EST MAY X REQ PHY/QHP: CPT | Performed by: INTERNAL MEDICINE

## 2021-09-22 PROCEDURE — 36415 COLL VENOUS BLD VENIPUNCTURE: CPT

## 2021-09-22 PROCEDURE — 82306 VITAMIN D 25 HYDROXY: CPT

## 2021-09-22 PROCEDURE — 99214 OFFICE O/P EST MOD 30 MIN: CPT | Performed by: INTERNAL MEDICINE

## 2021-09-22 PROCEDURE — 80053 COMPREHEN METABOLIC PANEL: CPT

## 2021-09-22 PROCEDURE — 83930 ASSAY OF BLOOD OSMOLALITY: CPT

## 2021-09-22 PROCEDURE — 83935 ASSAY OF URINE OSMOLALITY: CPT

## 2021-09-22 PROCEDURE — 84443 ASSAY THYROID STIM HORMONE: CPT

## 2021-09-22 RX ORDER — AMOXICILLIN 500 MG/1
CAPSULE ORAL
COMMUNITY
Start: 2021-07-14 | End: 2021-09-22

## 2021-09-23 LAB — OSMOLALITY SERPL: 284 MOSM/KG H2O (ref 278–298)
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
## 2021-10-07 ENCOUNTER — APPOINTMENT (OUTPATIENT)
Dept: RADIOLOGY | Facility: IMAGING CENTER | Age: 31
End: 2021-10-07
Attending: PHYSICIAN ASSISTANT
Payer: COMMERCIAL

## 2021-10-07 ENCOUNTER — OFFICE VISIT (OUTPATIENT)
Dept: URGENT CARE | Facility: CLINIC | Age: 31
End: 2021-10-07
Payer: COMMERCIAL

## 2021-10-07 VITALS
DIASTOLIC BLOOD PRESSURE: 70 MMHG | WEIGHT: 142.8 LBS | RESPIRATION RATE: 20 BRPM | HEART RATE: 76 BPM | HEIGHT: 65 IN | TEMPERATURE: 96.8 F | OXYGEN SATURATION: 97 % | BODY MASS INDEX: 23.79 KG/M2 | SYSTOLIC BLOOD PRESSURE: 110 MMHG

## 2021-10-07 DIAGNOSIS — R06.02 SHORTNESS OF BREATH: ICD-10-CM

## 2021-10-07 DIAGNOSIS — R11.0 NAUSEA: ICD-10-CM

## 2021-10-07 DIAGNOSIS — R07.9 CHEST PAIN, UNSPECIFIED TYPE: ICD-10-CM

## 2021-10-07 DIAGNOSIS — R10.11 RIGHT UPPER QUADRANT ABDOMINAL PAIN: ICD-10-CM

## 2021-10-07 DIAGNOSIS — K21.00 GASTROESOPHAGEAL REFLUX DISEASE WITH ESOPHAGITIS, UNSPECIFIED WHETHER HEMORRHAGE: ICD-10-CM

## 2021-10-07 PROCEDURE — 99215 OFFICE O/P EST HI 40 MIN: CPT | Performed by: PHYSICIAN ASSISTANT

## 2021-10-07 PROCEDURE — 71046 X-RAY EXAM CHEST 2 VIEWS: CPT | Mod: TC | Performed by: PHYSICIAN ASSISTANT

## 2021-10-07 PROCEDURE — 93000 ELECTROCARDIOGRAM COMPLETE: CPT | Performed by: PHYSICIAN ASSISTANT

## 2021-10-07 RX ORDER — OMEPRAZOLE 40 MG/1
40 CAPSULE, DELAYED RELEASE ORAL DAILY
Qty: 30 CAPSULE | Refills: 0 | Status: SHIPPED | OUTPATIENT
Start: 2021-10-07 | End: 2021-11-05 | Stop reason: SDUPTHER

## 2021-10-07 RX ORDER — SUCRALFATE 1 G/1
1 TABLET ORAL
Qty: 120 TABLET | Refills: 0 | Status: SHIPPED | OUTPATIENT
Start: 2021-10-07 | End: 2021-11-05 | Stop reason: SDUPTHER

## 2021-10-07 ASSESSMENT — ENCOUNTER SYMPTOMS
SHORTNESS OF BREATH: 1
ABDOMINAL PAIN: 1
NAUSEA: 1

## 2021-10-07 NOTE — PROGRESS NOTES
"Subjective:     Mary Carmen Head  is a 31 y.o. female who presents for Chest Pain (x 1 wk, Lt. chest pain, shortness of breath after eating fatty food)       Chest Pain   Associated symptoms include abdominal pain, nausea and shortness of breath.     Ms. Head is a 31-year-old female who presents to urgent care with 1 week history of left-sided chest pain.  Patient reports pain began after eating a fatty meal.  She also drank some soda which is not usual for her.  Patient has been in contact with her primary care provider and a rather extensive conversation regarding this problem over the course of the past 1 week.  Patient reports continued ongoing left-sided chest pain that tends to wax and wane.  Pain will sometimes feel worse with food and sometimes feel better with food.  She also reports associated shortness of breath and episodes of feeling as if she will \"pass out\"..  She is also had some mild right upper quadrant tenderness.  Patient denies any recent fever, chills, fatigue, body aches.  She denies recent Covid.    Patient does still have her gallbladder.  Patient denies any previous cardiac history.  Patient denies prior history of DVT or PE.  She has had no recent surgery or immobilization, denies leg pain or swelling and does not currently be treated for malignancy.    Patient did speak with her primary care provider on MyChart who recommended omeprazole which she has been taking for the past 5 days with no significant improvement in symptoms.  Patient denies hematemesis, hematochezia or melena.    Review of Systems   Respiratory: Positive for shortness of breath.    Cardiovascular: Positive for chest pain.   Gastrointestinal: Positive for abdominal pain and nausea.   All other systems reviewed and are negative.    Allergies   Allergen Reactions   • Claritin      Heart palpitations    • Epinephrine      Past medical history, family history, surgical history and social history are reviewed " "and updated in the record today.     Objective:   /70 (BP Location: Left arm, Patient Position: Sitting, BP Cuff Size: Adult)   Pulse 76   Temp 36 °C (96.8 °F) (Temporal)   Resp 20   Ht 1.651 m (5' 5\")   Wt 64.8 kg (142 lb 12.8 oz)   SpO2 97%   BMI 23.76 kg/m²   Physical Exam  Vitals and nursing note reviewed.   Constitutional:       General: She is not in acute distress.     Appearance: She is well-developed. She is not ill-appearing or toxic-appearing.   HENT:      Head: Normocephalic and atraumatic.      Right Ear: Tympanic membrane, ear canal and external ear normal.      Left Ear: Tympanic membrane, ear canal and external ear normal.      Nose: Nose normal.      Mouth/Throat:      Lips: Pink. No lesions.      Mouth: Mucous membranes are moist.      Pharynx: Oropharynx is clear. Uvula midline. No oropharyngeal exudate.   Eyes:      General: Lids are normal.      Extraocular Movements: Extraocular movements intact.      Conjunctiva/sclera: Conjunctivae normal.      Pupils: Pupils are equal, round, and reactive to light.   Cardiovascular:      Rate and Rhythm: Normal rate and regular rhythm.      Heart sounds: Normal heart sounds. No murmur heard.   No friction rub. No gallop.    Pulmonary:      Effort: Pulmonary effort is normal. No respiratory distress.      Breath sounds: Normal breath sounds.   Chest:      Chest wall: No tenderness.   Abdominal:      General: Abdomen is flat. Bowel sounds are normal. There is no distension.      Palpations: Abdomen is soft. There is no mass.      Tenderness: There is no abdominal tenderness. There is no guarding or rebound.      Comments: Patient is intolerant of abdominal exam reporting tenderness throughout the entire abdomen, unable to fully assess for Falk sign given patient's level of discomfort sure with touching the abdomen.  She reports that she does not like to have \"my abdomen touched\".   Musculoskeletal:         General: No tenderness or deformity. " Normal range of motion.      Cervical back: Normal range of motion and neck supple.   Lymphadenopathy:      Head:      Right side of head: No submental, submandibular or tonsillar adenopathy.      Left side of head: No submental, submandibular or tonsillar adenopathy.      Cervical: No cervical adenopathy.      Upper Body:      Right upper body: No supraclavicular adenopathy.      Left upper body: No supraclavicular adenopathy.   Skin:     General: Skin is warm and dry.      Findings: No rash.   Neurological:      Mental Status: She is alert and oriented to person, place, and time.      Cranial Nerves: Cranial nerves are intact. No cranial nerve deficit.      Sensory: Sensation is intact. No sensory deficit.      Motor: Motor function is intact.      Coordination: Coordination is intact. Coordination normal.      Gait: Gait is intact.   Psychiatric:         Attention and Perception: Attention normal.         Mood and Affect: Mood and affect normal.         Speech: Speech normal.         Behavior: Behavior normal. Behavior is cooperative.         Thought Content: Thought content normal.         Judgment: Judgment normal.          Assessment/Plan:   1. Chest pain, unspecified type  - DX-CHEST-2 VIEWS; Future  - US-RUQ; Future  - EKG - Clinic Performed  - CBC WITH DIFFERENTIAL; Future  - Comp Metabolic Panel; Future  - LIPASE; Future  - AMB REFERRAL BACK TO [facility] PCP    2. Shortness of breath  - DX-CHEST-2 VIEWS; Future  - US-RUQ; Future  - EKG - Clinic Performed  - CBC WITH DIFFERENTIAL; Future  - Comp Metabolic Panel; Future  - LIPASE; Future  - AMB REFERRAL BACK TO [facility] PCP    3. Nausea  - DX-CHEST-2 VIEWS; Future  - US-RUQ; Future  - CBC WITH DIFFERENTIAL; Future  - Comp Metabolic Panel; Future  - LIPASE; Future  - AMB REFERRAL BACK TO [facility] PCP    4. Right upper quadrant abdominal pain  - DX-CHEST-2 VIEWS; Future  - US-RUQ; Future  - CBC WITH DIFFERENTIAL; Future  - Comp Metabolic Panel; Future  - LIPASE;  Future  - AMB REFERRAL BACK TO [facility] PCP    5. Gastroesophageal reflux disease with esophagitis, unspecified whether hemorrhage  - omeprazole (PRILOSEC) 40 MG delayed-release capsule; Take 1 Capsule by mouth every day.  Dispense: 30 Capsule; Refill: 0  - sucralfate (CARAFATE) 1 GM Tab; Take 1 Tablet by mouth 4 Times a Day,Before Meals and at Bedtime for 30 days.  Dispense: 120 Tablet; Refill: 0  - AMB REFERRAL BACK TO [facility] PCP    Wells score for PE: 0  EKG:  Comparison: None available  Rhythm: Sinus rhythm  Ectopy: None  Rate: 60  QRS Axis: Normal  QTc 419  Conduction: Normal  ST segment: No ST segment elevation or depression noted  T waves: Slight flattening of T wave in lead III, otherwise no significant T wave abnormalities  Other:  Clinical impression: Nonspecific EKG, sinus rhythm with no prior comparison    Chest x-ray is obtained, read by radiologist and reviewed by myself with findings as follows:      RADIOLOGY RESULTS   DX-CHEST-2 VIEWS    Result Date: 10/7/2021  10/7/2021 4:10 PM HISTORY/REASON FOR EXAM:  Chest Pain. Dyspnea. TECHNIQUE/EXAM DESCRIPTION AND NUMBER OF VIEWS: Two views of the chest. COMPARISON:  None available. FINDINGS: The mediastinal and cardiac silhouette is unremarkable. The pulmonary vascularity is within normal limits. The lung parenchyma is clear. There is no significant pleural effusion. There is no visible pneumothorax. There are no acute bony abnormalities.     1.  Unremarkable two view chest.         Check ultrasound and labs as above, will contact patient with results once available.  Suspect GERD: Recommend increase omeprazole to 40 mg daily and add Carafate.  Follow-up with primary care.  Referral placed for follow-up with primary care.  Primary care included on today's note for their review.    Reviewed with patient that I have a low index of suspicion for this being cardiac or PE in nature.  However, if she continues to experience chest pain or has worsening of chest pain  or becomes concerned I did recommend that she present to the emergency department for further evaluation and management as we have exhausted options for management in the urgent care setting.    Total time spent on patient care today including chart review, reading patient's rather extensive communication with her primary care provider, obtaining health history, performing physical exam, ordering tests, reviewing imaging, discussing plan of care and chartin minutes    Differential diagnosis, natural history, supportive care, and indications for immediate follow-up discussed.  Red flag warning symptoms and strict ER/follow-up precautions given.  The patient demonstrated a good understanding and agreed with the treatment plan.  Please note that this note was created using voice recognition speech to text software. Every effort has been made to correct obvious errors.  However, I expect there are errors of grammar and possibly context that were not discovered prior to finalizing the note  DEANNE Barroso PA-C

## 2021-10-08 ENCOUNTER — HOSPITAL ENCOUNTER (OUTPATIENT)
Dept: RADIOLOGY | Facility: MEDICAL CENTER | Age: 31
End: 2021-10-08
Attending: PHYSICIAN ASSISTANT
Payer: COMMERCIAL

## 2021-10-08 DIAGNOSIS — R07.9 CHEST PAIN, UNSPECIFIED TYPE: ICD-10-CM

## 2021-10-08 DIAGNOSIS — R10.11 RIGHT UPPER QUADRANT ABDOMINAL PAIN: ICD-10-CM

## 2021-10-08 DIAGNOSIS — R06.02 SHORTNESS OF BREATH: ICD-10-CM

## 2021-10-08 DIAGNOSIS — R11.0 NAUSEA: ICD-10-CM

## 2021-10-08 PROCEDURE — 76705 ECHO EXAM OF ABDOMEN: CPT

## 2021-10-09 ENCOUNTER — HOSPITAL ENCOUNTER (OUTPATIENT)
Dept: LAB | Facility: MEDICAL CENTER | Age: 31
End: 2021-10-09
Attending: PHYSICIAN ASSISTANT
Payer: COMMERCIAL

## 2021-10-09 DIAGNOSIS — R11.0 NAUSEA: ICD-10-CM

## 2021-10-09 DIAGNOSIS — R07.9 CHEST PAIN, UNSPECIFIED TYPE: ICD-10-CM

## 2021-10-09 DIAGNOSIS — R10.11 RIGHT UPPER QUADRANT ABDOMINAL PAIN: ICD-10-CM

## 2021-10-09 DIAGNOSIS — R06.02 SHORTNESS OF BREATH: ICD-10-CM

## 2021-10-09 LAB
ALBUMIN SERPL BCP-MCNC: 4.3 G/DL (ref 3.2–4.9)
ALBUMIN/GLOB SERPL: 1.3 G/DL
ALP SERPL-CCNC: 43 U/L (ref 30–99)
ALT SERPL-CCNC: 46 U/L (ref 2–50)
ANION GAP SERPL CALC-SCNC: 11 MMOL/L (ref 7–16)
AST SERPL-CCNC: 30 U/L (ref 12–45)
BASOPHILS # BLD AUTO: 0.8 % (ref 0–1.8)
BASOPHILS # BLD: 0.06 K/UL (ref 0–0.12)
BILIRUB SERPL-MCNC: 0.4 MG/DL (ref 0.1–1.5)
BUN SERPL-MCNC: 7 MG/DL (ref 8–22)
CALCIUM SERPL-MCNC: 9.5 MG/DL (ref 8.5–10.5)
CHLORIDE SERPL-SCNC: 100 MMOL/L (ref 96–112)
CO2 SERPL-SCNC: 23 MMOL/L (ref 20–33)
CREAT SERPL-MCNC: 0.73 MG/DL (ref 0.5–1.4)
EOSINOPHIL # BLD AUTO: 0.34 K/UL (ref 0–0.51)
EOSINOPHIL NFR BLD: 4.5 % (ref 0–6.9)
ERYTHROCYTE [DISTWIDTH] IN BLOOD BY AUTOMATED COUNT: 39.5 FL (ref 35.9–50)
FASTING STATUS PATIENT QL REPORTED: NORMAL
GLOBULIN SER CALC-MCNC: 3.4 G/DL (ref 1.9–3.5)
GLUCOSE SERPL-MCNC: 84 MG/DL (ref 65–99)
HCT VFR BLD AUTO: 39.4 % (ref 37–47)
HGB BLD-MCNC: 13.6 G/DL (ref 12–16)
IMM GRANULOCYTES # BLD AUTO: 0.02 K/UL (ref 0–0.11)
IMM GRANULOCYTES NFR BLD AUTO: 0.3 % (ref 0–0.9)
LIPASE SERPL-CCNC: 31 U/L (ref 11–82)
LYMPHOCYTES # BLD AUTO: 3.29 K/UL (ref 1–4.8)
LYMPHOCYTES NFR BLD: 43.4 % (ref 22–41)
MCH RBC QN AUTO: 30.8 PG (ref 27–33)
MCHC RBC AUTO-ENTMCNC: 34.5 G/DL (ref 33.6–35)
MCV RBC AUTO: 89.1 FL (ref 81.4–97.8)
MONOCYTES # BLD AUTO: 0.68 K/UL (ref 0–0.85)
MONOCYTES NFR BLD AUTO: 9 % (ref 0–13.4)
NEUTROPHILS # BLD AUTO: 3.19 K/UL (ref 2–7.15)
NEUTROPHILS NFR BLD: 42 % (ref 44–72)
NRBC # BLD AUTO: 0 K/UL
NRBC BLD-RTO: 0 /100 WBC
PLATELET # BLD AUTO: 278 K/UL (ref 164–446)
PMV BLD AUTO: 10.8 FL (ref 9–12.9)
POTASSIUM SERPL-SCNC: 3.6 MMOL/L (ref 3.6–5.5)
PROT SERPL-MCNC: 7.7 G/DL (ref 6–8.2)
RBC # BLD AUTO: 4.42 M/UL (ref 4.2–5.4)
SODIUM SERPL-SCNC: 134 MMOL/L (ref 135–145)
WBC # BLD AUTO: 7.6 K/UL (ref 4.8–10.8)

## 2021-10-09 PROCEDURE — 83690 ASSAY OF LIPASE: CPT

## 2021-10-09 PROCEDURE — 80053 COMPREHEN METABOLIC PANEL: CPT

## 2021-10-09 PROCEDURE — 85025 COMPLETE CBC W/AUTO DIFF WBC: CPT

## 2021-10-09 PROCEDURE — 36415 COLL VENOUS BLD VENIPUNCTURE: CPT

## 2021-10-11 ENCOUNTER — TELEPHONE (OUTPATIENT)
Dept: URGENT CARE | Facility: PHYSICIAN GROUP | Age: 31
End: 2021-10-11

## 2021-10-11 DIAGNOSIS — K82.8 GALLBLADDER SLUDGE: ICD-10-CM

## 2021-10-11 NOTE — TELEPHONE ENCOUNTER
Attempted to contact patient with test results.  Voicemail left.  Ultrasound does show sludge in the gallbladder with questionable small stones.  Referral placed to general surgery.  To ED for severe worsening of pain.  Callback number provided to call with additional questions.  Mandy Barroso PA-C

## 2021-10-13 ENCOUNTER — TELEPHONE (OUTPATIENT)
Dept: URGENT CARE | Facility: CLINIC | Age: 31
End: 2021-10-13

## 2021-10-14 NOTE — TELEPHONE ENCOUNTER
Returned call to patient to answer questions regarding ultrasound. All questions answered.   Mandy Barroso PA-C

## 2021-11-03 ENCOUNTER — PRE-ADMISSION TESTING (OUTPATIENT)
Dept: ADMISSIONS | Facility: MEDICAL CENTER | Age: 31
End: 2021-11-03
Attending: COLON & RECTAL SURGERY
Payer: COMMERCIAL

## 2021-11-04 ENCOUNTER — PRE-ADMISSION TESTING (OUTPATIENT)
Dept: ADMISSIONS | Facility: MEDICAL CENTER | Age: 31
End: 2021-11-04
Attending: COLON & RECTAL SURGERY
Payer: COMMERCIAL

## 2021-11-04 DIAGNOSIS — Z01.812 PRE-OPERATIVE LABORATORY EXAMINATION: ICD-10-CM

## 2021-11-04 LAB
ANION GAP SERPL CALC-SCNC: 13 MMOL/L (ref 7–16)
BUN SERPL-MCNC: 5 MG/DL (ref 8–22)
CALCIUM SERPL-MCNC: 9.5 MG/DL (ref 8.5–10.5)
CHLORIDE SERPL-SCNC: 103 MMOL/L (ref 96–112)
CO2 SERPL-SCNC: 21 MMOL/L (ref 20–33)
CREAT SERPL-MCNC: 0.61 MG/DL (ref 0.5–1.4)
ERYTHROCYTE [DISTWIDTH] IN BLOOD BY AUTOMATED COUNT: 40.7 FL (ref 35.9–50)
GLUCOSE SERPL-MCNC: 81 MG/DL (ref 65–99)
HCG SERPL QL: NEGATIVE
HCT VFR BLD AUTO: 36.5 % (ref 37–47)
HGB BLD-MCNC: 12.2 G/DL (ref 12–16)
MCH RBC QN AUTO: 30.1 PG (ref 27–33)
MCHC RBC AUTO-ENTMCNC: 33.4 G/DL (ref 33.6–35)
MCV RBC AUTO: 90.1 FL (ref 81.4–97.8)
PLATELET # BLD AUTO: 221 K/UL (ref 164–446)
PMV BLD AUTO: 10.7 FL (ref 9–12.9)
POTASSIUM SERPL-SCNC: 3.8 MMOL/L (ref 3.6–5.5)
RBC # BLD AUTO: 4.05 M/UL (ref 4.2–5.4)
SARS-COV+SARS-COV-2 AG RESP QL IA.RAPID: NOTDETECTED
SODIUM SERPL-SCNC: 137 MMOL/L (ref 135–145)
SPECIMEN SOURCE: NORMAL
WBC # BLD AUTO: 4.8 K/UL (ref 4.8–10.8)

## 2021-11-04 PROCEDURE — 80048 BASIC METABOLIC PNL TOTAL CA: CPT

## 2021-11-04 PROCEDURE — 87426 SARSCOV CORONAVIRUS AG IA: CPT

## 2021-11-04 PROCEDURE — 84703 CHORIONIC GONADOTROPIN ASSAY: CPT

## 2021-11-04 PROCEDURE — 85027 COMPLETE CBC AUTOMATED: CPT

## 2021-11-04 PROCEDURE — 36415 COLL VENOUS BLD VENIPUNCTURE: CPT

## 2021-11-05 ENCOUNTER — HOSPITAL ENCOUNTER (OUTPATIENT)
Facility: MEDICAL CENTER | Age: 31
End: 2021-11-05
Attending: COLON & RECTAL SURGERY | Admitting: COLON & RECTAL SURGERY
Payer: COMMERCIAL

## 2021-11-05 ENCOUNTER — ANESTHESIA (OUTPATIENT)
Dept: SURGERY | Facility: MEDICAL CENTER | Age: 31
End: 2021-11-05
Payer: COMMERCIAL

## 2021-11-05 ENCOUNTER — ANESTHESIA EVENT (OUTPATIENT)
Dept: SURGERY | Facility: MEDICAL CENTER | Age: 31
End: 2021-11-05
Payer: COMMERCIAL

## 2021-11-05 VITALS
OXYGEN SATURATION: 95 % | TEMPERATURE: 97.5 F | BODY MASS INDEX: 22.74 KG/M2 | HEART RATE: 86 BPM | HEIGHT: 65 IN | SYSTOLIC BLOOD PRESSURE: 119 MMHG | DIASTOLIC BLOOD PRESSURE: 79 MMHG | WEIGHT: 136.47 LBS | RESPIRATION RATE: 16 BRPM

## 2021-11-05 DIAGNOSIS — G89.18 POSTOPERATIVE PAIN: ICD-10-CM

## 2021-11-05 PROCEDURE — 160046 HCHG PACU - 1ST 60 MINS PHASE II: Performed by: COLON & RECTAL SURGERY

## 2021-11-05 PROCEDURE — 501399 HCHG SPECIMAN BAG, ENDO CATC: Performed by: COLON & RECTAL SURGERY

## 2021-11-05 PROCEDURE — 501583 HCHG TROCAR, THRD CAN&SEAL 5X100: Performed by: COLON & RECTAL SURGERY

## 2021-11-05 PROCEDURE — 160048 HCHG OR STATISTICAL LEVEL 1-5: Performed by: COLON & RECTAL SURGERY

## 2021-11-05 PROCEDURE — 160035 HCHG PACU - 1ST 60 MINS PHASE I: Performed by: COLON & RECTAL SURGERY

## 2021-11-05 PROCEDURE — 88304 TISSUE EXAM BY PATHOLOGIST: CPT

## 2021-11-05 PROCEDURE — 160002 HCHG RECOVERY MINUTES (STAT): Performed by: COLON & RECTAL SURGERY

## 2021-11-05 PROCEDURE — 160009 HCHG ANES TIME/MIN: Performed by: COLON & RECTAL SURGERY

## 2021-11-05 PROCEDURE — 501338 HCHG SHEARS, ENDO: Performed by: COLON & RECTAL SURGERY

## 2021-11-05 PROCEDURE — A9270 NON-COVERED ITEM OR SERVICE: HCPCS | Performed by: ANESTHESIOLOGY

## 2021-11-05 PROCEDURE — 501570 HCHG TROCAR, SEPARATOR: Performed by: COLON & RECTAL SURGERY

## 2021-11-05 PROCEDURE — 700111 HCHG RX REV CODE 636 W/ 250 OVERRIDE (IP): Performed by: ANESTHESIOLOGY

## 2021-11-05 PROCEDURE — 160025 RECOVERY II MINUTES (STATS): Performed by: COLON & RECTAL SURGERY

## 2021-11-05 PROCEDURE — 502571 HCHG PACK, LAP CHOLE: Performed by: COLON & RECTAL SURGERY

## 2021-11-05 PROCEDURE — 160036 HCHG PACU - EA ADDL 30 MINS PHASE I: Performed by: COLON & RECTAL SURGERY

## 2021-11-05 PROCEDURE — 160041 HCHG SURGERY MINUTES - EA ADDL 1 MIN LEVEL 4: Performed by: COLON & RECTAL SURGERY

## 2021-11-05 PROCEDURE — 700101 HCHG RX REV CODE 250: Performed by: COLON & RECTAL SURGERY

## 2021-11-05 PROCEDURE — 160029 HCHG SURGERY MINUTES - 1ST 30 MINS LEVEL 4: Performed by: COLON & RECTAL SURGERY

## 2021-11-05 PROCEDURE — 700101 HCHG RX REV CODE 250: Performed by: ANESTHESIOLOGY

## 2021-11-05 PROCEDURE — 700102 HCHG RX REV CODE 250 W/ 637 OVERRIDE(OP): Performed by: ANESTHESIOLOGY

## 2021-11-05 PROCEDURE — 501838 HCHG SUTURE GENERAL: Performed by: COLON & RECTAL SURGERY

## 2021-11-05 PROCEDURE — 700105 HCHG RX REV CODE 258: Performed by: COLON & RECTAL SURGERY

## 2021-11-05 PROCEDURE — 501497 HCHG SURGICLIP: Performed by: COLON & RECTAL SURGERY

## 2021-11-05 PROCEDURE — 501571 HCHG TROCAR, SEPARATOR 12X100: Performed by: COLON & RECTAL SURGERY

## 2021-11-05 RX ORDER — HYDROMORPHONE HYDROCHLORIDE 1 MG/ML
0.2 INJECTION, SOLUTION INTRAMUSCULAR; INTRAVENOUS; SUBCUTANEOUS
Status: DISCONTINUED | OUTPATIENT
Start: 2021-11-05 | End: 2021-11-05 | Stop reason: HOSPADM

## 2021-11-05 RX ORDER — CEFAZOLIN SODIUM 1 G/3ML
INJECTION, POWDER, FOR SOLUTION INTRAMUSCULAR; INTRAVENOUS PRN
Status: DISCONTINUED | OUTPATIENT
Start: 2021-11-05 | End: 2021-11-05 | Stop reason: SURG

## 2021-11-05 RX ORDER — ONDANSETRON 4 MG/1
4 TABLET, ORALLY DISINTEGRATING ORAL EVERY 6 HOURS PRN
Qty: 20 TABLET | Refills: 0 | Status: SHIPPED | OUTPATIENT
Start: 2021-11-05 | End: 2022-02-22

## 2021-11-05 RX ORDER — HALOPERIDOL 5 MG/ML
1 INJECTION INTRAMUSCULAR
Status: DISCONTINUED | OUTPATIENT
Start: 2021-11-05 | End: 2021-11-05 | Stop reason: HOSPADM

## 2021-11-05 RX ORDER — CEFAZOLIN SODIUM 1 G/3ML
INJECTION, POWDER, FOR SOLUTION INTRAMUSCULAR; INTRAVENOUS PRN
Status: DISCONTINUED | OUTPATIENT
Start: 2021-11-05 | End: 2021-11-05

## 2021-11-05 RX ORDER — OXYCODONE HCL 5 MG/5 ML
5 SOLUTION, ORAL ORAL
Status: COMPLETED | OUTPATIENT
Start: 2021-11-05 | End: 2021-11-05

## 2021-11-05 RX ORDER — ONDANSETRON 2 MG/ML
INJECTION INTRAMUSCULAR; INTRAVENOUS PRN
Status: DISCONTINUED | OUTPATIENT
Start: 2021-11-05 | End: 2021-11-05 | Stop reason: SURG

## 2021-11-05 RX ORDER — DEXAMETHASONE SODIUM PHOSPHATE 4 MG/ML
INJECTION, SOLUTION INTRA-ARTICULAR; INTRALESIONAL; INTRAMUSCULAR; INTRAVENOUS; SOFT TISSUE PRN
Status: DISCONTINUED | OUTPATIENT
Start: 2021-11-05 | End: 2021-11-05 | Stop reason: SURG

## 2021-11-05 RX ORDER — LIDOCAINE HYDROCHLORIDE 20 MG/ML
INJECTION, SOLUTION EPIDURAL; INFILTRATION; INTRACAUDAL; PERINEURAL PRN
Status: DISCONTINUED | OUTPATIENT
Start: 2021-11-05 | End: 2021-11-05 | Stop reason: SURG

## 2021-11-05 RX ORDER — HYDROMORPHONE HYDROCHLORIDE 1 MG/ML
0.1 INJECTION, SOLUTION INTRAMUSCULAR; INTRAVENOUS; SUBCUTANEOUS
Status: DISCONTINUED | OUTPATIENT
Start: 2021-11-05 | End: 2021-11-05 | Stop reason: HOSPADM

## 2021-11-05 RX ORDER — HYDROCODONE BITARTRATE AND ACETAMINOPHEN 5; 325 MG/1; MG/1
1 TABLET ORAL EVERY 4 HOURS PRN
Qty: 18 TABLET | Refills: 0 | Status: SHIPPED | OUTPATIENT
Start: 2021-11-05 | End: 2021-11-08

## 2021-11-05 RX ORDER — ONDANSETRON 2 MG/ML
4 INJECTION INTRAMUSCULAR; INTRAVENOUS
Status: DISCONTINUED | OUTPATIENT
Start: 2021-11-05 | End: 2021-11-05 | Stop reason: HOSPADM

## 2021-11-05 RX ORDER — KETOROLAC TROMETHAMINE 30 MG/ML
INJECTION, SOLUTION INTRAMUSCULAR; INTRAVENOUS PRN
Status: DISCONTINUED | OUTPATIENT
Start: 2021-11-05 | End: 2021-11-05 | Stop reason: SURG

## 2021-11-05 RX ORDER — OXYCODONE HCL 5 MG/5 ML
10 SOLUTION, ORAL ORAL
Status: COMPLETED | OUTPATIENT
Start: 2021-11-05 | End: 2021-11-05

## 2021-11-05 RX ORDER — SODIUM CHLORIDE, SODIUM LACTATE, POTASSIUM CHLORIDE, CALCIUM CHLORIDE 600; 310; 30; 20 MG/100ML; MG/100ML; MG/100ML; MG/100ML
INJECTION, SOLUTION INTRAVENOUS CONTINUOUS
Status: ACTIVE | OUTPATIENT
Start: 2021-11-05 | End: 2021-11-05

## 2021-11-05 RX ORDER — DIPHENHYDRAMINE HYDROCHLORIDE 50 MG/ML
12.5 INJECTION INTRAMUSCULAR; INTRAVENOUS
Status: DISCONTINUED | OUTPATIENT
Start: 2021-11-05 | End: 2021-11-05 | Stop reason: HOSPADM

## 2021-11-05 RX ORDER — MEPERIDINE HYDROCHLORIDE 25 MG/ML
12.5 INJECTION INTRAMUSCULAR; INTRAVENOUS; SUBCUTANEOUS
Status: DISCONTINUED | OUTPATIENT
Start: 2021-11-05 | End: 2021-11-05 | Stop reason: HOSPADM

## 2021-11-05 RX ORDER — HYDROMORPHONE HYDROCHLORIDE 1 MG/ML
0.4 INJECTION, SOLUTION INTRAMUSCULAR; INTRAVENOUS; SUBCUTANEOUS
Status: DISCONTINUED | OUTPATIENT
Start: 2021-11-05 | End: 2021-11-05 | Stop reason: HOSPADM

## 2021-11-05 RX ADMIN — LIDOCAINE HYDROCHLORIDE 100 MG: 20 INJECTION, SOLUTION EPIDURAL; INFILTRATION; INTRACAUDAL at 15:43

## 2021-11-05 RX ADMIN — FENTANYL CITRATE 100 MCG: 50 INJECTION, SOLUTION INTRAMUSCULAR; INTRAVENOUS at 15:44

## 2021-11-05 RX ADMIN — FENTANYL CITRATE 25 MCG: 0.05 INJECTION, SOLUTION INTRAMUSCULAR; INTRAVENOUS at 16:45

## 2021-11-05 RX ADMIN — CEFAZOLIN 2 G: 330 INJECTION, POWDER, FOR SOLUTION INTRAMUSCULAR; INTRAVENOUS at 15:43

## 2021-11-05 RX ADMIN — FENTANYL CITRATE 25 MCG: 0.05 INJECTION, SOLUTION INTRAMUSCULAR; INTRAVENOUS at 16:34

## 2021-11-05 RX ADMIN — OXYCODONE HYDROCHLORIDE 5 MG: 5 SOLUTION ORAL at 16:34

## 2021-11-05 RX ADMIN — PROPOFOL 150 MG: 10 INJECTION, EMULSION INTRAVENOUS at 15:43

## 2021-11-05 RX ADMIN — SODIUM CHLORIDE, POTASSIUM CHLORIDE, SODIUM LACTATE AND CALCIUM CHLORIDE: 600; 310; 30; 20 INJECTION, SOLUTION INTRAVENOUS at 15:06

## 2021-11-05 RX ADMIN — DEXAMETHASONE SODIUM PHOSPHATE 8 MG: 4 INJECTION, SOLUTION INTRA-ARTICULAR; INTRALESIONAL; INTRAMUSCULAR; INTRAVENOUS; SOFT TISSUE at 15:43

## 2021-11-05 RX ADMIN — FENTANYL CITRATE 50 MCG: 50 INJECTION, SOLUTION INTRAMUSCULAR; INTRAVENOUS at 17:17

## 2021-11-05 RX ADMIN — ONDANSETRON 4 MG: 2 INJECTION INTRAMUSCULAR; INTRAVENOUS at 15:43

## 2021-11-05 RX ADMIN — KETOROLAC TROMETHAMINE 30 MG: 30 INJECTION, SOLUTION INTRAMUSCULAR at 15:43

## 2021-11-05 RX ADMIN — LIDOCAINE HYDROCHLORIDE 0.5 ML: 10 INJECTION, SOLUTION EPIDURAL; INFILTRATION; INTRACAUDAL; PERINEURAL at 15:05

## 2021-11-05 ASSESSMENT — PAIN DESCRIPTION - PAIN TYPE
TYPE: SURGICAL PAIN
TYPE: SURGICAL PAIN
TYPE: ACUTE PAIN
TYPE: SURGICAL PAIN

## 2021-11-05 ASSESSMENT — PAIN SCALES - GENERAL: PAIN_LEVEL: 3

## 2021-11-05 ASSESSMENT — FIBROSIS 4 INDEX
FIB4 SCORE: 0.6
FIB4 SCORE: 0.6

## 2021-11-05 NOTE — ANESTHESIA POSTPROCEDURE EVALUATION
Patient: Mary Carmen Head    Procedure Summary     Date: 11/05/21 Room / Location: [place] [id number] / SURGERY [facility]    Anesthesia Start: 1538 Anesthesia Stop: 1627    Procedure: CHOLECYSTECTOMY, LAPAROSCOPIC (N/A ) Diagnosis: (Gallbladder sludge)    Surgeons: Rigoberto Overton M.D. Responsible Provider: Michael Bassett M.D.    Anesthesia Type: general ASA Status: 3          Final Anesthesia Type: general  Last vitals  BP   Blood Pressure: 106/57    Temp   36.2 °C (97.1 °F)    Pulse   68   Resp   18    SpO2   97 %      Anesthesia Post Evaluation    Patient location during evaluation: PACU  Patient participation: complete - patient participated  Level of consciousness: awake and alert  Pain score: 3    Airway patency: patent  Anesthetic complications: no  Cardiovascular status: hemodynamically stable  Respiratory status: acceptable  Hydration status: euvolemic    PONV: none          No complications documented.     Nurse Pain Score: 1 (NPRS)

## 2021-11-05 NOTE — OP REPORT
NAME:  Mary Carmen Head  MRN:  [id number]  :  1990      DATE OF OPERATION: 2021    PREOPERATIVE DIAGNOSIS: Biliary colic    POSTOPERATIVE DIAGNOSIS: Biliary colic    OPERATION PERFORMED: Laparoscopic cholecystectomy    SURGEON: Rigoberto Overton MD    ASSISTANT:  Obdulia Singer PA-C, PA-C    ANESTHESIOLOGIST:  Anesthesiologist: Michael Bassett M.D.    ANESTHESIA: General endotracheal anesthesia.     FINDINGS: The gallbladder showed signs of Biliary colic.     SPECIMEN: Gallbladder.    ESTIMATED BLOOD LOSS: <10 cc.     INDICATIONS: The patient is a 31 y.o. female with a history of symptomatic Biliary colic. She is taken to the operating room today for laparoscopic cholecystectomy.     PROCEDURE: Following informed consent, the patient was properly identified, taken to the operating room, and placed in the supine position where general endotracheal anesthesia was administered. Intravenous antibiotics were administered by the anesthesiologist in the correct time interval. Sequential compression devices were employed. The abdomen was prepped and draped into a sterile field.     A bladeless optical entry trocar was carefully inserted into the abdomen and a pneumoperitoneum was established in the usual fashion.  A 5 mm separator port was introduced. A 5 mm lens/camera was passed into the peritoneal cavity.  An 11 mm port was placed in the epigastric midline under direct vision. Two 5 mm right subcostal ports were placed under direct vision.     Careful examination of the gallbladder and liver were performed.  The gallbladder was then grasped and elevated upward toward the right shoulder.  Dissection was carried out in hepatocystic triangle definitively identifying the cystic duct and cystic   artery. These structures were multiply clipped proximally, once distally and   divided. The gallbladder was dissected free from the undersurface of the   liver using electrocautery and placed within an EndoCatch bag.  It was delivered intact from the abdominal cavity through the epigastric port site. The gallbladder fossa was irrigated. All excess irrigant was evacuated from the abdominal cavity. Hemostasis was satisfactory.     The ports were removed and the abdomen desufflated. The enlarged epigastric port site fascia was approximated with a 0 Vicryl suture. The port site skin incisions were closed with interrupted 4-0 Vicryl subcuticular sutures.  Steri-Strips and Benzoin were applied beneath sterile Band-Aids.     The patient tolerated the procedure well and there were no apparent complications. All sponge, needle, and instrument counts were correct on 2 separate occasions. She was awakened, extubated, and transferred to the recovery room in satisfactory condition.       ____________________________________   Rigoberto Overton MD  DD: 11/5/2021  4:10 PM    CC:  [facility];
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
## 2021-11-05 NOTE — ANESTHESIA TIME REPORT
Anesthesia Start and Stop Event Times     Date Time Event    11/5/2021 1524 Ready for Procedure     1538 Anesthesia Start     1627 Anesthesia Stop        Responsible Staff  11/05/21    Name Role Begin End    Michael Bassett M.D. Anesth 1538 1627        Preop Diagnosis (Free Text):  Pre-op Diagnosis     GALLBLADDER SLUDGE        Preop Diagnosis (Codes):    Premium Reason  B. 1st Call    Comments:

## 2021-11-05 NOTE — ANESTHESIA PROCEDURE NOTES
Airway    Date/Time: 11/5/2021 3:45 PM  Performed by: Michael Bassett M.D.  Authorized by: Michael Bassett M.D.     Location:  OR  Urgency:  Elective  Indications for Airway Management:  Anesthesia      Spontaneous Ventilation: absent    Sedation Level:  Deep  Preoxygenated: Yes    Patient Position:  Sniffing  Final Airway Type:  Endotracheal airway  Final Endotracheal Airway:  ETT  Cuffed: Yes    Technique Used for Successful ETT Placement:  Direct laryngoscopy    Insertion Site:  Oral  Blade Type:  Irene  Laryngoscope Blade/Videolaryngoscope Blade Size:  3  ETT Size (mm):  6.5  Measured from:  Teeth  ETT to Teeth (cm):  21  Placement Verified by: auscultation and capnometry    Cormack-Lehane Classification:  Grade I - full view of glottis  Number of Attempts at Approach:  1

## 2021-11-06 NOTE — DISCHARGE INSTRUCTIONS
D/C instructions:    1. DIET: Upon discharge from the hospital you may resume your normal preoperative diet. Depending on how you are feeling and whether you have nausea or not, you may wish to stay with a bland diet for the first few days. However, you can advance this as quickly as you feel ready.    2. ACTIVITIES: After discharge from the hospital, you may resume full routine activities. However, there should be no heavy lifting (greater than 15 pounds) and no strenuous activities for 4-6 weeks after surgery. Otherwise, routine activities of daily living are acceptable.    3. DRIVING: You may drive whenever you are off pain medications and are able to perform the activities needed to drive, i.e. turning, bending, twisting, etc.    4. BATHING/DRESSINGS: You may get the wound wet 2 days after surgery. You may shower, but do not submerge in a bath for at least a week. Bandaids/Dressings may come off after 48 hours. You have steri-strips under your dressings. These steri-strips should stay in place. They will fall off over 5-7 days.     5. BOWEL FUNCTION: Constipation is common after an operation, especially with pain medications. The combination of pain medication and decreased activity level can cause constipation in otherwise normal patients. If you feel this is occurring, take a laxative (Miralax, Milk of Magnesia, Ex-Lax, Senokot, etc.) until the problem has resolved.    6. PAIN MEDICATION: You will be given a prescription for pain medication at discharge. Please take these as directed. It is important to remember not to take medications on an empty stomach as this may cause nausea.    7.CALL IF YOU HAVE: (1) Fevers to more than 101.0 F, (2) Unusual chest or leg pain, (3) Drainage or fluid from incision that may be foul smelling, increased tenderness or soreness at the wound or the wound edges are no longer together, redness or swelling at the incision site. Please do not hesitate to call with any other  questions.     8. APPOINTMENT: Contact our office at [phone] for a follow-up appointment in 1-2 weeks following your procedure.    If you have any additional questions, please do not hesitate to call the office and speak to either myself or the physician on call.    Office address:  [facility].  [address] [place] [address]  [address]  [place] [id number]    MILD FLU-LIKE SYMPTOMS ARE NORMAL. YOU MAY EXPERIENCE GENERALIZED MUSCLE ACHES, THROAT IRRITATION, HEADACHE AND/OR SOME NAUSEA.    FOR 24 HOURS DO NOT:  Drive, operate machinery or run household appliances.  Drink beer or alcoholic beverages.   Make important decisions or sign legal documents.    You should CALL YOUR PHYSICIAN if you develop:  Fever greater than 101 degrees F.  Pain not relieved by medication, or persistent nausea or vomiting.  Excessive bleeding (blood soaking through dressing) or unexpected drainage from the wound.  Extreme redness or swelling around the incision site, drainage of pus or foul smelling drainage.  Inability to urinate or empty your bladder within 8 hours.  Problems with breathing or chest pain.    You should call 911 if you develop problems with breathing or chest pain.  If you are unable to contact your doctor or surgical center, you should go to the nearest emergency room or urgent care center.  Physician's telephone #: Dr. Overton, [phone]    If any questions arise, call your doctor.  If your doctor is not available, please feel free to call the Surgical Center at [phone]. The Contact Center is open Monday through Friday 7AM to 5PM and may speak to a nurse at [phone], or toll free at [phone].     A registered nurse may call you a few days after your surgery to see how you are doing after your procedure.    MEDICATIONS: Resume taking daily medication.  Take prescribed pain medication with food.  If no medication is prescribed, you may take non-aspirin pain medication if needed.  PAIN MEDICATION CAN  BE VERY CONSTIPATING.  Take a stool softener or laxative such as senokot, pericolace, or milk of magnesia if needed.    Prescription given for norco (pain) and zofran (nausea).  Last pain medication given at 16:34.    If your physician has prescribed pain medication that includes Acetaminophen (Tylenol), do not take additional Acetaminophen (Tylenol) while taking the prescribed medication.    Depression / Suicide Risk    As you are discharged from this [facility] facility, it is important to learn how to keep safe from harming yourself.    Recognize the warning signs:  · Abrupt changes in personality, positive or negative- including increase in energy   · Giving away possessions  · Change in eating patterns- significant weight changes-  positive or negative  · Change in sleeping patterns- unable to sleep or sleeping all the time   · Unwillingness or inability to communicate  · Depression  · Unusual sadness, discouragement and loneliness  · Talk of wanting to die  · Neglect of personal appearance   · Rebelliousness- reckless behavior  · Withdrawal from people/activities they love  · Confusion- inability to concentrate     If you or a loved one observes any of these behaviors or has concerns about self-harm, here's what you can do:  · Talk about it- your feelings and reasons for harming yourself  · Remove any means that you might use to hurt yourself (examples: pills, rope, extension cords, firearm)  · Get professional help from the community (Mental Health, Substance Abuse, psychological counseling)  · Do not be alone:Call your Safe Contact- someone whom you trust who will be there for you.  · Call your local CRISIS HOTLINE 742-0948 or 658-254-7063  · Call your local Children's Mobile Crisis Response Team [place] (718) 765-4412 or www.[employer]  · Call the toll free National Suicide Prevention Hotlines   · National Suicide Prevention Lifeline 615-057-EMLL (6550)  · National femeninas Line Network  800-SUICIDE (332-8947)

## 2021-11-06 NOTE — OR NURSING
PT arrived to PACU from OR. VSS. Dressing to ABD CDI. Upper incision on ABD has scant drainage. PT reports pain, medicated per MAR. Denies nausea. PT also reporting SOB, however quickly resolved with pain medication. Pt on RA, VSS. PT mother updated on plan of care. Report called to phase 2 [facility]. PT rating pain 4/10 and tolerable. No nausea. Upper incision dressing reinforced with gauze and Tegaderm due to slow ooze of drainage from site.

## 2021-11-08 LAB — PATHOLOGY CONSULT NOTE: NORMAL

## 2021-11-20 DIAGNOSIS — E55.9 VITAMIN D DEFICIENCY: ICD-10-CM

## 2021-11-22 RX ORDER — CHOLECALCIFEROL (VITAMIN D3) 1250 MCG
CAPSULE ORAL
Qty: 6 CAPSULE | Refills: 3 | Status: SHIPPED | OUTPATIENT
Start: 2021-11-22 | End: 2022-05-20

## 2021-11-22 NOTE — TELEPHONE ENCOUNTER
Received request via: Pharmacy    Was the patient seen in the last year in this department? Yes    Does the patient have an active prescription (recently filled or refills available) for medication(s) requested? No     REQUESTED MEDICATION:   Requested Prescriptions     Pending Prescriptions Disp Refills   • Cholecalciferol (VITAMIN D3) 1.25 MG (96476 UT) Cap [Pharmacy Med Name: VITAMIN D3 50,000 UNIT CAPSULE] 6 Capsule 3     Sig: TAKE 1 CAPSULE BY MOUTH EVERY 14 DAYS

## 2021-12-08 DIAGNOSIS — K21.00 GASTROESOPHAGEAL REFLUX DISEASE WITH ESOPHAGITIS, UNSPECIFIED WHETHER HEMORRHAGE: ICD-10-CM

## 2021-12-09 RX ORDER — OMEPRAZOLE 40 MG/1
40 CAPSULE, DELAYED RELEASE ORAL DAILY
Qty: 30 CAPSULE | Refills: 6 | Status: SHIPPED | OUTPATIENT
Start: 2021-12-09 | End: 2022-02-22

## 2022-01-03 DIAGNOSIS — E23.2 DIABETES INSIPIDUS (HCC): ICD-10-CM

## 2022-01-03 RX ORDER — DESMOPRESSIN ACETATE 0.1 MG/1
TABLET ORAL
Qty: 360 TABLET | Refills: 3 | Status: SHIPPED | OUTPATIENT
Start: 2022-01-03 | End: 2022-03-14

## 2022-02-22 ENCOUNTER — OFFICE VISIT (OUTPATIENT)
Dept: ENDOCRINOLOGY | Facility: MEDICAL CENTER | Age: 32
End: 2022-02-22
Attending: INTERNAL MEDICINE
Payer: COMMERCIAL

## 2022-02-22 ENCOUNTER — TELEPHONE (OUTPATIENT)
Dept: ENDOCRINOLOGY | Facility: MEDICAL CENTER | Age: 32
End: 2022-02-22
Payer: COMMERCIAL

## 2022-02-22 ENCOUNTER — HOSPITAL ENCOUNTER (OUTPATIENT)
Dept: LAB | Facility: MEDICAL CENTER | Age: 32
End: 2022-02-22
Attending: INTERNAL MEDICINE
Payer: COMMERCIAL

## 2022-02-22 VITALS
OXYGEN SATURATION: 98 % | DIASTOLIC BLOOD PRESSURE: 70 MMHG | HEART RATE: 69 BPM | WEIGHT: 138.4 LBS | BODY MASS INDEX: 23.06 KG/M2 | SYSTOLIC BLOOD PRESSURE: 106 MMHG | HEIGHT: 65 IN

## 2022-02-22 DIAGNOSIS — E23.2 DIABETES INSIPIDUS (HCC): ICD-10-CM

## 2022-02-22 DIAGNOSIS — N30.01 ACUTE CYSTITIS WITH HEMATURIA: ICD-10-CM

## 2022-02-22 DIAGNOSIS — Z79.899 HIGH RISK MEDICATION USE: ICD-10-CM

## 2022-02-22 DIAGNOSIS — R35.0 FREQUENT URINATION: ICD-10-CM

## 2022-02-22 DIAGNOSIS — E55.9 VITAMIN D DEFICIENCY: ICD-10-CM

## 2022-02-22 DIAGNOSIS — E03.9 ACQUIRED HYPOTHYROIDISM: ICD-10-CM

## 2022-02-22 LAB
25(OH)D3 SERPL-MCNC: 64 NG/ML (ref 30–100)
ALBUMIN SERPL BCP-MCNC: 4 G/DL (ref 3.2–4.9)
ALBUMIN/GLOB SERPL: 1.1 G/DL
ALP SERPL-CCNC: 42 U/L (ref 30–99)
ALT SERPL-CCNC: 18 U/L (ref 2–50)
ANION GAP SERPL CALC-SCNC: 11 MMOL/L (ref 7–16)
APPEARANCE UR: CLEAR
AST SERPL-CCNC: 20 U/L (ref 12–45)
BACTERIA #/AREA URNS HPF: ABNORMAL /HPF
BILIRUB SERPL-MCNC: 0.4 MG/DL (ref 0.1–1.5)
BILIRUB UR QL STRIP.AUTO: NEGATIVE
BUN SERPL-MCNC: 7 MG/DL (ref 8–22)
CALCIUM SERPL-MCNC: 9.3 MG/DL (ref 8.4–10.2)
CHLORIDE SERPL-SCNC: 101 MMOL/L (ref 96–112)
CO2 SERPL-SCNC: 23 MMOL/L (ref 20–33)
COLOR UR: YELLOW
CREAT SERPL-MCNC: 0.64 MG/DL (ref 0.5–1.4)
EPI CELLS #/AREA URNS HPF: ABNORMAL /HPF
GLOBULIN SER CALC-MCNC: 3.6 G/DL (ref 1.9–3.5)
GLUCOSE SERPL-MCNC: 85 MG/DL (ref 65–99)
GLUCOSE UR STRIP.AUTO-MCNC: NEGATIVE MG/DL
KETONES UR STRIP.AUTO-MCNC: NEGATIVE MG/DL
LEUKOCYTE ESTERASE UR QL STRIP.AUTO: ABNORMAL
MICRO URNS: ABNORMAL
NITRITE UR QL STRIP.AUTO: NEGATIVE
PH UR STRIP.AUTO: 7 [PH] (ref 5–8)
POTASSIUM SERPL-SCNC: 4.3 MMOL/L (ref 3.6–5.5)
PROT SERPL-MCNC: 7.6 G/DL (ref 6–8.2)
PROT UR QL STRIP: NEGATIVE MG/DL
RBC # URNS HPF: ABNORMAL /HPF
RBC UR QL AUTO: ABNORMAL
SODIUM SERPL-SCNC: 135 MMOL/L (ref 135–145)
SP GR UR STRIP.AUTO: 1.01
T4 FREE SERPL-MCNC: 1.48 NG/DL (ref 0.93–1.7)
TSH SERPL DL<=0.005 MIU/L-ACNC: 3.43 UIU/ML (ref 0.38–5.33)
WBC #/AREA URNS HPF: ABNORMAL /HPF

## 2022-02-22 PROCEDURE — 82306 VITAMIN D 25 HYDROXY: CPT

## 2022-02-22 PROCEDURE — 99214 OFFICE O/P EST MOD 30 MIN: CPT | Performed by: INTERNAL MEDICINE

## 2022-02-22 PROCEDURE — 84443 ASSAY THYROID STIM HORMONE: CPT

## 2022-02-22 PROCEDURE — 81001 URINALYSIS AUTO W/SCOPE: CPT

## 2022-02-22 PROCEDURE — 84439 ASSAY OF FREE THYROXINE: CPT

## 2022-02-22 PROCEDURE — 36415 COLL VENOUS BLD VENIPUNCTURE: CPT

## 2022-02-22 PROCEDURE — 99211 OFF/OP EST MAY X REQ PHY/QHP: CPT | Performed by: INTERNAL MEDICINE

## 2022-02-22 PROCEDURE — 80053 COMPREHEN METABOLIC PANEL: CPT

## 2022-02-22 RX ORDER — FOLIC ACID 0.8 MG
TABLET ORAL
COMMUNITY
Start: 2022-02-01

## 2022-02-22 RX ORDER — NITROFURANTOIN 25; 75 MG/1; MG/1
100 CAPSULE ORAL 2 TIMES DAILY
Qty: 10 CAPSULE | Refills: 0 | Status: SHIPPED | OUTPATIENT
Start: 2022-02-22 | End: 2022-04-06

## 2022-02-22 ASSESSMENT — FIBROSIS 4 INDEX: FIB4 SCORE: 0.6
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
## 2022-03-02 ENCOUNTER — OFFICE VISIT (OUTPATIENT)
Dept: MEDICAL GROUP | Facility: MEDICAL CENTER | Age: 32
End: 2022-03-02
Payer: COMMERCIAL

## 2022-03-02 VITALS
HEART RATE: 83 BPM | WEIGHT: 135 LBS | SYSTOLIC BLOOD PRESSURE: 120 MMHG | DIASTOLIC BLOOD PRESSURE: 66 MMHG | TEMPERATURE: 98 F | BODY MASS INDEX: 22.49 KG/M2 | OXYGEN SATURATION: 98 % | HEIGHT: 65 IN

## 2022-03-02 DIAGNOSIS — Z91.410 HISTORY OF ADULT PHYSICAL AND SEXUAL ABUSE: ICD-10-CM

## 2022-03-02 DIAGNOSIS — R30.0 DYSURIA: ICD-10-CM

## 2022-03-02 DIAGNOSIS — F33.1 MODERATE EPISODE OF RECURRENT MAJOR DEPRESSIVE DISORDER (HCC): ICD-10-CM

## 2022-03-02 DIAGNOSIS — E23.2 DIABETES INSIPIDUS (HCC): ICD-10-CM

## 2022-03-02 DIAGNOSIS — E03.9 ACQUIRED HYPOTHYROIDISM: ICD-10-CM

## 2022-03-02 DIAGNOSIS — F43.10 POSTTRAUMATIC STRESS DISORDER: ICD-10-CM

## 2022-03-02 DIAGNOSIS — F51.01 PRIMARY INSOMNIA: ICD-10-CM

## 2022-03-02 PROCEDURE — 99214 OFFICE O/P EST MOD 30 MIN: CPT | Performed by: NURSE PRACTITIONER

## 2022-03-02 ASSESSMENT — PATIENT HEALTH QUESTIONNAIRE - PHQ9
SUM OF ALL RESPONSES TO PHQ9 QUESTIONS 1 AND 2: 2
7. TROUBLE CONCENTRATING ON THINGS, SUCH AS READING THE NEWSPAPER OR WATCHING TELEVISION: SEVERAL DAYS
9. THOUGHTS THAT YOU WOULD BE BETTER OFF DEAD, OR OF HURTING YOURSELF: NOT AT ALL
SUM OF ALL RESPONSES TO PHQ QUESTIONS 1-9: 10
1. LITTLE INTEREST OR PLEASURE IN DOING THINGS: SEVERAL DAYS
3. TROUBLE FALLING OR STAYING ASLEEP OR SLEEPING TOO MUCH: MORE THAN HALF THE DAYS
4. FEELING TIRED OR HAVING LITTLE ENERGY: MORE THAN HALF THE DAYS
8. MOVING OR SPEAKING SO SLOWLY THAT OTHER PEOPLE COULD HAVE NOTICED. OR THE OPPOSITE, BEING SO FIGETY OR RESTLESS THAT YOU HAVE BEEN MOVING AROUND A LOT MORE THAN USUAL: NOT AT ALL
6. FEELING BAD ABOUT YOURSELF - OR THAT YOU ARE A FAILURE OR HAVE LET YOURSELF OR YOUR FAMILY DOWN: MORE THAN HALF THE DAYS
2. FEELING DOWN, DEPRESSED, IRRITABLE, OR HOPELESS: SEVERAL DAYS
5. POOR APPETITE OR OVEREATING: SEVERAL DAYS

## 2022-03-02 ASSESSMENT — FIBROSIS 4 INDEX: FIB4 SCORE: 0.64

## 2022-03-02 NOTE — ASSESSMENT & PLAN NOTE
Diagnosed with UTI on 2/22, prescribed macrobid BID x 5 days, she did miss a few days of antibiotics. Was evaluated due to increased nocturia. This may have slightly improved, but she is unsure.

## 2022-03-03 NOTE — ASSESSMENT & PLAN NOTE
Chronic. Continuing to see endocrinology, continues to take desmopressin 0.2 mg twice daily. Symptoms stable.

## 2022-03-03 NOTE — ASSESSMENT & PLAN NOTE
Chronic problem, ongoing for several years. Failed fluoxetine and effexor in the past. PMDD symptoms are well controlled with COCP. No SI/HI. No longer seeing therapy. Feels that symptoms are fairly stable.

## 2022-03-12 DIAGNOSIS — E23.2 DIABETES INSIPIDUS (HCC): ICD-10-CM

## 2022-03-14 RX ORDER — DESMOPRESSIN ACETATE 0.1 MG/1
TABLET ORAL
Qty: 90 TABLET | Refills: 2 | Status: SHIPPED | OUTPATIENT
Start: 2022-03-14 | End: 2022-05-30

## 2022-03-28 ENCOUNTER — HOSPITAL ENCOUNTER (OUTPATIENT)
Dept: LAB | Facility: MEDICAL CENTER | Age: 32
End: 2022-03-28
Attending: NURSE PRACTITIONER
Payer: COMMERCIAL

## 2022-03-28 DIAGNOSIS — R30.0 DYSURIA: ICD-10-CM

## 2022-03-28 LAB
APPEARANCE UR: CLEAR
BACTERIA #/AREA URNS HPF: NEGATIVE /HPF
BILIRUB UR QL STRIP.AUTO: NEGATIVE
COLOR UR: YELLOW
EPI CELLS #/AREA URNS HPF: NORMAL /HPF
GLUCOSE UR STRIP.AUTO-MCNC: NEGATIVE MG/DL
HYALINE CASTS #/AREA URNS LPF: NORMAL /LPF
KETONES UR STRIP.AUTO-MCNC: NEGATIVE MG/DL
LEUKOCYTE ESTERASE UR QL STRIP.AUTO: NEGATIVE
MICRO URNS: ABNORMAL
NITRITE UR QL STRIP.AUTO: NEGATIVE
PH UR STRIP.AUTO: 7.5 [PH] (ref 5–8)
PROT UR QL STRIP: NEGATIVE MG/DL
RBC # URNS HPF: NORMAL /HPF
RBC UR QL AUTO: ABNORMAL
SP GR UR STRIP.AUTO: 1
UROBILINOGEN UR STRIP.AUTO-MCNC: 0.2 MG/DL
WBC #/AREA URNS HPF: NORMAL /HPF

## 2022-03-28 PROCEDURE — 81001 URINALYSIS AUTO W/SCOPE: CPT

## 2022-04-06 DIAGNOSIS — N30.01 ACUTE CYSTITIS WITH HEMATURIA: ICD-10-CM

## 2022-04-06 DIAGNOSIS — E03.9 ACQUIRED HYPOTHYROIDISM: ICD-10-CM

## 2022-04-06 RX ORDER — LEVOTHYROXINE SODIUM 75 MCG
TABLET ORAL
Qty: 90 TABLET | Refills: 2 | Status: SHIPPED | OUTPATIENT
Start: 2022-04-06 | End: 2023-03-13

## 2022-04-06 RX ORDER — NITROFURANTOIN 25; 75 MG/1; MG/1
CAPSULE ORAL
Qty: 10 CAPSULE | Refills: 0 | Status: ON HOLD | OUTPATIENT
Start: 2022-04-06 | End: 2022-09-03

## 2022-04-07 ENCOUNTER — HOSPITAL ENCOUNTER (OUTPATIENT)
Facility: MEDICAL CENTER | Age: 32
End: 2022-04-07
Attending: NURSE PRACTITIONER
Payer: COMMERCIAL

## 2022-04-07 ENCOUNTER — OFFICE VISIT (OUTPATIENT)
Dept: MEDICAL GROUP | Facility: MEDICAL CENTER | Age: 32
End: 2022-04-07
Payer: COMMERCIAL

## 2022-04-07 VITALS
RESPIRATION RATE: 12 BRPM | HEIGHT: 65 IN | BODY MASS INDEX: 21.99 KG/M2 | WEIGHT: 132 LBS | OXYGEN SATURATION: 97 % | HEART RATE: 66 BPM | TEMPERATURE: 97.4 F | DIASTOLIC BLOOD PRESSURE: 58 MMHG | SYSTOLIC BLOOD PRESSURE: 98 MMHG

## 2022-04-07 DIAGNOSIS — R30.0 DYSURIA: ICD-10-CM

## 2022-04-07 DIAGNOSIS — Z12.4 PAP SMEAR FOR CERVICAL CANCER SCREENING: ICD-10-CM

## 2022-04-07 LAB
APPEARANCE UR: CLEAR
BILIRUB UR STRIP-MCNC: NORMAL MG/DL
COLOR UR AUTO: YELLOW
GLUCOSE UR STRIP.AUTO-MCNC: NORMAL MG/DL
KETONES UR STRIP.AUTO-MCNC: NORMAL MG/DL
LEUKOCYTE ESTERASE UR QL STRIP.AUTO: NORMAL
NITRITE UR QL STRIP.AUTO: NORMAL
PH UR STRIP.AUTO: 7 [PH] (ref 5–8)
PROT UR QL STRIP: NORMAL MG/DL
RBC UR QL AUTO: NORMAL
SP GR UR STRIP.AUTO: 1.01
UROBILINOGEN UR STRIP-MCNC: 0.2 MG/DL

## 2022-04-07 PROCEDURE — 88175 CYTOPATH C/V AUTO FLUID REDO: CPT

## 2022-04-07 PROCEDURE — 99395 PREV VISIT EST AGE 18-39: CPT | Performed by: NURSE PRACTITIONER

## 2022-04-07 PROCEDURE — 87591 N.GONORRHOEAE DNA AMP PROB: CPT

## 2022-04-07 PROCEDURE — 81002 URINALYSIS NONAUTO W/O SCOPE: CPT | Performed by: NURSE PRACTITIONER

## 2022-04-07 PROCEDURE — 87491 CHLMYD TRACH DNA AMP PROBE: CPT

## 2022-04-07 PROCEDURE — 87086 URINE CULTURE/COLONY COUNT: CPT

## 2022-04-07 PROCEDURE — 87624 HPV HI-RISK TYP POOLED RSLT: CPT

## 2022-04-07 ASSESSMENT — FIBROSIS 4 INDEX: FIB4 SCORE: 0.66

## 2022-04-07 NOTE — PROGRESS NOTES
SUBJECTIVE: 32 y.o. female for annual routine gynecologic exam  Chief Complaint   Patient presents with   • Annual Exam     pap       OB History   No obstetric history on file.      Last Pap: around 4 years ago  Social History     Substance and Sexual Activity   Sexual Activity Not Currently     Sexual history: denies being sexually active, on oral contraceptives   H/O Abnormal Pap no  She  reports that she quit smoking about 4 years ago. Her smoking use included cigarettes. She has never used smokeless tobacco.        Allergies: Claritin and Epinephrine     ROS:    Reports no menopause symptoms of hot flashes, night sweats, sleep disruption, mood changes.Denies vaginal dryness.   Menses stopped with birth control    No significant bloating/fluid retention, pelvic pain, or dyspareunia. No vaginal discharge   No breast tenderness, dimpling, mass, nipple discharge, skin changes, changes in size or contour, or abnormal cyclic discomfort.  No urinary tract symptoms, no incontinence, no polydipsia, polyuria,  No abdominal pain, change in bowel habits, black or bloody stools.    No unusual headaches, no visual changes, menstrual migraines   No prolonged cough. No dyspnea or chest pain on exertion.  No depression, labile mood, anxiety, libido changes, insomnia.  No temperature intolerance.  No new/concerning skin lesions, concerns.     Current medicines (including changes today)  Current Outpatient Medications   Medication Sig Dispense Refill   • SYNTHROID 75 MCG Tab TAKE 1 TABLET BY MOUTH EVERY DAY IN THE MORNING ON AN EMPTY STOMACH 90 Tablet 2   • desmopressin (DDAVP) 0.1 MG Tab TAKE 1 TABLET BY MOUTH AT BEDTIME 90 Tablet 2   • Ferrous Sulfate (IRON PO)      • FEVERFEW PO      • Magnesium 500 MG Cap      • Cholecalciferol (VITAMIN D3) 1.25 MG (45711 UT) Cap TAKE 1 CAPSULE BY MOUTH EVERY 14 DAYS 6 Capsule 3   • Cyanocobalamin (VITAMIN B-12 PO) Take 1 Tablet by mouth every day.     • LORYNA 3-0.02 MG per tablet TAKE 1  [facility] to IP Report (EDIP)    Mental Status     Alert and oriented    Safety     None    Tele  Yes    Oxygen Needs  room air    Mobility    Independent    Protocols  None    ISAR     1 (09/10/19 1311)    Isolation  None    Additional Information:    "TABLET BY MOUTH EVERY DAY (Patient taking differently: Take 1 Tablet by mouth every day.) 84 Tablet 3   • vitamin D, Ergocalciferol, (DRISDOL) 1.25 MG (14245 UT) Cap capsule Take 1 Capsule by mouth every 14 days. Please provide D3 only and not D2. 6 Capsule 3   • Capsicum, Cayenne, (CAYENNE PO) Take 1 Tablet by mouth every day.     • ascorbic acid (ASCORBIC ACID) 500 MG Tab Take 500 mg by mouth every day.     • Multiple Vitamins-Minerals (OCUVITE-LUTEIN) Tab Take 1 tablet by mouth every day.     • Multiple Vitamins-Minerals (HAIR/SKIN/NAILS) Tab Take 1 Tab by mouth every day.     • coenzyme Q-10 30 MG capsule Take 60 mg by mouth every day.     • Probiotic Product (PROBIOTIC PO) Take 1 Cap by mouth every day.     • nitrofurantoin (MACROBID) 100 MG Cap TAKE 1 CAPSULE BY MOUTH TWICE A DAY FOR 5 DAYS 10 Capsule 0     No current facility-administered medications for this visit.     She  has a past medical history of Breath shortness (11/03/2021), Diabetes insipidus (HCC), Heart burn, History of IBS, Hypothyroidism, Indigestion, PMDD (premenstrual dysphoric disorder), and Vitamin D deficiency (12/12/2018).  She  has a past surgical history that includes dental extraction(s) and abilio by laparoscopy (N/A, 11/5/2021).     Family History:   Family History   Problem Relation Age of Onset   • Hypertension Mother    • Thyroid Mother    • Heart Disease Maternal Grandmother    • Heart Disease Maternal Grandfather    • Heart Disease Paternal Grandmother    • Scoliosis Paternal Grandmother    • Prostate cancer Paternal Grandfather        OBJECTIVE:   BP (!) 98/58 (BP Location: Right arm, Patient Position: Sitting, BP Cuff Size: Adult)   Pulse 66   Temp 36.3 °C (97.4 °F)   Resp 12   Ht 1.651 m (5' 5\")   Wt 59.9 kg (132 lb)   SpO2 97%   BMI 21.97 kg/m²   Body mass index is 21.97 kg/m².    General/Constitutional: Vitals as above, Well nourished, well developed female in no acute distress  HEAD AND NECK:  Ears normal.  Throat, " oral cavity and tongue normal.  Neck supple. No adenopathy or masses in the neck or supraclavicular regions. No thyromegaly.   PSYCH: Judgment grossly appropriate, no apparent depression/anxiety  CHEST:  Clear, good air entry, no wheezes or rales.   HEART:  Regular rate and rhythm.  S1 and S2 normal. No edema  ABDOMEN:  Soft without tenderness, guarding, mass or organomegaly.  No CVA tenderness or inguinal adenopathy.   Genitourinary: Grossly normal external female genitalia with skin within normal limits,   EXTREMITIES:  Extremities and peripheral pulses are normal.   SKIN: color normal, vascularity normal, no edema, temperature normal   No rashes or suspicious skin lesions noted.     Breast Exam: Symmetrical, normal consistency without masses., No dimpling or skin changes, Normal nipples without discharge, no axillary lymphadenopathy  Pelvic Exam -  Normal external genitalia with no lesions. Vaginal Mucosa:  normal vaginal mucosa, normal discharge . no adnexal masses or tenderness, no cervical motion tenderness, no uterine tenderness, speculum used with warm water as lubrication, Cervix well visualized with no discharge/friability/bleeding, cervical broom used to obtain cervical specimen, Cervix with no visible lesions. Thin Prep Pap is obtained, vaginal swab is not obtained and specimen(s) sent to lab    <ASSESSMENT and PLAN>  1. Dysuria  Unstable  UA positive for blood and trace leuks, sent for culture  POCT Urinalysis    URINE CULTURE(NEW)   2. Pap smear for cervical cancer screening  Pap collected, call with results.   THINPREP PAP W/HPV AND CTNG       Discussed  breast self exam   Follow-up in 5 years for next Gyn exam and 5 years for next Pap.   Next office visit for recheck of chronic medical conditions is due in 2 months    I have placed the below orders and discussed them with an approved delegating provider. The MA is performing the below orders under the direction of Dr. Mack

## 2022-04-08 DIAGNOSIS — Z12.4 PAP SMEAR FOR CERVICAL CANCER SCREENING: ICD-10-CM

## 2022-04-08 LAB
C TRACH DNA GENITAL QL NAA+PROBE: NEGATIVE
CYTOLOGY REG CYTOL: NORMAL
HPV HR 12 DNA CVX QL NAA+PROBE: NEGATIVE
HPV16 DNA SPEC QL NAA+PROBE: NEGATIVE
HPV18 DNA SPEC QL NAA+PROBE: NEGATIVE
N GONORRHOEA DNA GENITAL QL NAA+PROBE: NEGATIVE
SPECIMEN SOURCE: NORMAL
SPECIMEN SOURCE: NORMAL

## 2022-04-10 LAB
BACTERIA UR CULT: NORMAL
SIGNIFICANT IND 70042: NORMAL
SITE SITE: NORMAL
SOURCE SOURCE: NORMAL

## 2022-05-20 DIAGNOSIS — E55.9 VITAMIN D DEFICIENCY: ICD-10-CM

## 2022-05-20 RX ORDER — CHOLECALCIFEROL (VITAMIN D3) 1250 MCG
CAPSULE ORAL
Qty: 6 CAPSULE | Refills: 3 | Status: SHIPPED | OUTPATIENT
Start: 2022-05-20 | End: 2023-08-01

## 2022-05-30 DIAGNOSIS — E23.2 DIABETES INSIPIDUS (HCC): ICD-10-CM

## 2022-05-30 RX ORDER — DESMOPRESSIN ACETATE 0.1 MG/1
TABLET ORAL
Qty: 135 TABLET | Refills: 2 | Status: SHIPPED | OUTPATIENT
Start: 2022-05-30 | End: 2023-06-19

## 2022-07-13 ENCOUNTER — OFFICE VISIT (OUTPATIENT)
Dept: MEDICAL GROUP | Facility: MEDICAL CENTER | Age: 32
End: 2022-07-13
Payer: COMMERCIAL

## 2022-07-13 VITALS
SYSTOLIC BLOOD PRESSURE: 100 MMHG | OXYGEN SATURATION: 97 % | TEMPERATURE: 97.6 F | DIASTOLIC BLOOD PRESSURE: 82 MMHG | BODY MASS INDEX: 21.7 KG/M2 | WEIGHT: 130.4 LBS | HEART RATE: 67 BPM

## 2022-07-13 DIAGNOSIS — M79.604 RIGHT LEG PAIN: ICD-10-CM

## 2022-07-13 PROCEDURE — 99214 OFFICE O/P EST MOD 30 MIN: CPT | Performed by: NURSE PRACTITIONER

## 2022-07-13 ASSESSMENT — FIBROSIS 4 INDEX: FIB4 SCORE: 0.66

## 2022-07-14 ENCOUNTER — HOSPITAL ENCOUNTER (OUTPATIENT)
Dept: RADIOLOGY | Facility: MEDICAL CENTER | Age: 32
End: 2022-07-14
Attending: NURSE PRACTITIONER
Payer: COMMERCIAL

## 2022-07-14 DIAGNOSIS — M79.604 RIGHT LEG PAIN: ICD-10-CM

## 2022-07-14 PROCEDURE — 93971 EXTREMITY STUDY: CPT | Mod: RT
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
## 2022-08-24 ENCOUNTER — OFFICE VISIT (OUTPATIENT)
Dept: ENDOCRINOLOGY | Facility: MEDICAL CENTER | Age: 32
End: 2022-08-24
Attending: INTERNAL MEDICINE
Payer: COMMERCIAL

## 2022-08-24 VITALS
OXYGEN SATURATION: 99 % | WEIGHT: 124 LBS | HEART RATE: 69 BPM | BODY MASS INDEX: 20.66 KG/M2 | SYSTOLIC BLOOD PRESSURE: 96 MMHG | DIASTOLIC BLOOD PRESSURE: 68 MMHG | HEIGHT: 65 IN

## 2022-08-24 DIAGNOSIS — Z79.899 HIGH RISK MEDICATION USE: ICD-10-CM

## 2022-08-24 DIAGNOSIS — E03.9 ACQUIRED HYPOTHYROIDISM: ICD-10-CM

## 2022-08-24 DIAGNOSIS — E23.2 DIABETES INSIPIDUS (HCC): ICD-10-CM

## 2022-08-24 DIAGNOSIS — E55.9 VITAMIN D DEFICIENCY: ICD-10-CM

## 2022-08-24 PROCEDURE — 99211 OFF/OP EST MAY X REQ PHY/QHP: CPT | Performed by: INTERNAL MEDICINE

## 2022-08-24 PROCEDURE — 99214 OFFICE O/P EST MOD 30 MIN: CPT | Performed by: INTERNAL MEDICINE

## 2022-08-24 RX ORDER — ERGOCALCIFEROL 1.25 MG/1
50000 CAPSULE ORAL
Qty: 12 CAPSULE | Refills: 1 | Status: SHIPPED | OUTPATIENT
Start: 2022-08-24 | End: 2023-08-01 | Stop reason: SDUPTHER

## 2022-08-24 ASSESSMENT — FIBROSIS 4 INDEX: FIB4 SCORE: 0.66
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
## 2022-09-01 ENCOUNTER — HOSPITAL ENCOUNTER (OUTPATIENT)
Dept: LAB | Facility: MEDICAL CENTER | Age: 32
End: 2022-09-01
Attending: INTERNAL MEDICINE
Payer: COMMERCIAL

## 2022-09-01 DIAGNOSIS — E03.9 ACQUIRED HYPOTHYROIDISM: ICD-10-CM

## 2022-09-01 DIAGNOSIS — E55.9 VITAMIN D DEFICIENCY: ICD-10-CM

## 2022-09-01 DIAGNOSIS — E23.2 DIABETES INSIPIDUS (HCC): ICD-10-CM

## 2022-09-01 DIAGNOSIS — Z79.899 HIGH RISK MEDICATION USE: ICD-10-CM

## 2022-09-01 LAB
25(OH)D3 SERPL-MCNC: 64 NG/ML (ref 30–100)
ALBUMIN SERPL BCP-MCNC: 4.1 G/DL (ref 3.2–4.9)
ALBUMIN/GLOB SERPL: 1.5 G/DL
ALP SERPL-CCNC: 38 U/L (ref 30–99)
ALT SERPL-CCNC: 17 U/L (ref 2–50)
ANION GAP SERPL CALC-SCNC: 11 MMOL/L (ref 7–16)
AST SERPL-CCNC: 11 U/L (ref 12–45)
BILIRUB SERPL-MCNC: 0.2 MG/DL (ref 0.1–1.5)
BUN SERPL-MCNC: 7 MG/DL (ref 8–22)
CALCIUM SERPL-MCNC: 8.7 MG/DL (ref 8.5–10.5)
CHLORIDE SERPL-SCNC: 95 MMOL/L (ref 96–112)
CO2 SERPL-SCNC: 23 MMOL/L (ref 20–33)
CREAT SERPL-MCNC: 0.52 MG/DL (ref 0.5–1.4)
GFR SERPLBLD CREATININE-BSD FMLA CKD-EPI: 127 ML/MIN/1.73 M 2
GLOBULIN SER CALC-MCNC: 2.8 G/DL (ref 1.9–3.5)
GLUCOSE SERPL-MCNC: 90 MG/DL (ref 65–99)
POTASSIUM SERPL-SCNC: 4.1 MMOL/L (ref 3.6–5.5)
PROT SERPL-MCNC: 6.9 G/DL (ref 6–8.2)
SODIUM SERPL-SCNC: 129 MMOL/L (ref 135–145)
T4 FREE SERPL-MCNC: 1.43 NG/DL (ref 0.93–1.7)
TSH SERPL DL<=0.005 MIU/L-ACNC: 0.23 UIU/ML (ref 0.38–5.33)

## 2022-09-01 PROCEDURE — 84443 ASSAY THYROID STIM HORMONE: CPT

## 2022-09-01 PROCEDURE — 80053 COMPREHEN METABOLIC PANEL: CPT

## 2022-09-01 PROCEDURE — 84439 ASSAY OF FREE THYROXINE: CPT

## 2022-09-01 PROCEDURE — 36415 COLL VENOUS BLD VENIPUNCTURE: CPT

## 2022-09-01 PROCEDURE — 82306 VITAMIN D 25 HYDROXY: CPT

## 2022-09-02 ENCOUNTER — HOSPITAL ENCOUNTER (INPATIENT)
Facility: MEDICAL CENTER | Age: 32
LOS: 1 days | DRG: 645 | End: 2022-09-03
Attending: EMERGENCY MEDICINE | Admitting: STUDENT IN AN ORGANIZED HEALTH CARE EDUCATION/TRAINING PROGRAM
Payer: COMMERCIAL

## 2022-09-02 DIAGNOSIS — E87.1 HYPONATREMIA: ICD-10-CM

## 2022-09-02 DIAGNOSIS — Z86.39: ICD-10-CM

## 2022-09-02 DIAGNOSIS — E23.2 DIABETES INSIPIDUS (HCC): ICD-10-CM

## 2022-09-02 DIAGNOSIS — R11.2 NON-INTRACTABLE VOMITING WITH NAUSEA, UNSPECIFIED VOMITING TYPE: ICD-10-CM

## 2022-09-02 LAB
ALBUMIN SERPL BCP-MCNC: 3.6 G/DL (ref 3.2–4.9)
ALBUMIN/GLOB SERPL: 1.2 G/DL
ALP SERPL-CCNC: 35 U/L (ref 30–99)
ALT SERPL-CCNC: 17 U/L (ref 2–50)
ANION GAP SERPL CALC-SCNC: 10 MMOL/L (ref 7–16)
ANION GAP SERPL CALC-SCNC: 11 MMOL/L (ref 7–16)
ANION GAP SERPL CALC-SCNC: 11 MMOL/L (ref 7–16)
ANION GAP SERPL CALC-SCNC: 13 MMOL/L (ref 7–16)
ANION GAP SERPL CALC-SCNC: 19 MMOL/L (ref 7–16)
APPEARANCE UR: CLEAR
AST SERPL-CCNC: 17 U/L (ref 12–45)
BACTERIA #/AREA URNS HPF: NEGATIVE /HPF
BASOPHILS # BLD AUTO: 0.4 % (ref 0–1.8)
BASOPHILS # BLD: 0.04 K/UL (ref 0–0.12)
BILIRUB SERPL-MCNC: 0.7 MG/DL (ref 0.1–1.5)
BILIRUB UR QL STRIP.AUTO: NEGATIVE
BUN SERPL-MCNC: 4 MG/DL (ref 8–22)
BUN SERPL-MCNC: 5 MG/DL (ref 8–22)
BUN SERPL-MCNC: 6 MG/DL (ref 8–22)
CALCIUM SERPL-MCNC: 8.6 MG/DL (ref 8.5–10.5)
CALCIUM SERPL-MCNC: 8.8 MG/DL (ref 8.5–10.5)
CALCIUM SERPL-MCNC: 9.2 MG/DL (ref 8.5–10.5)
CHLORIDE SERPL-SCNC: 103 MMOL/L (ref 96–112)
CHLORIDE SERPL-SCNC: 103 MMOL/L (ref 96–112)
CHLORIDE SERPL-SCNC: 84 MMOL/L (ref 96–112)
CHLORIDE SERPL-SCNC: 86 MMOL/L (ref 96–112)
CHLORIDE SERPL-SCNC: 99 MMOL/L (ref 96–112)
CO2 SERPL-SCNC: 14 MMOL/L (ref 20–33)
CO2 SERPL-SCNC: 21 MMOL/L (ref 20–33)
CO2 SERPL-SCNC: 22 MMOL/L (ref 20–33)
CO2 SERPL-SCNC: 23 MMOL/L (ref 20–33)
CO2 SERPL-SCNC: 23 MMOL/L (ref 20–33)
COLOR UR: YELLOW
CORTIS SERPL-MCNC: 36.8 UG/DL (ref 0–23)
CREAT SERPL-MCNC: 0.39 MG/DL (ref 0.5–1.4)
CREAT SERPL-MCNC: 0.47 MG/DL (ref 0.5–1.4)
CREAT SERPL-MCNC: 0.53 MG/DL (ref 0.5–1.4)
CREAT SERPL-MCNC: 0.58 MG/DL (ref 0.5–1.4)
CREAT SERPL-MCNC: 0.71 MG/DL (ref 0.5–1.4)
CREAT UR-MCNC: 60.71 MG/DL
EKG IMPRESSION: NORMAL
EOSINOPHIL # BLD AUTO: 0.06 K/UL (ref 0–0.51)
EOSINOPHIL NFR BLD: 0.6 % (ref 0–6.9)
EPI CELLS #/AREA URNS HPF: NORMAL /HPF
ERYTHROCYTE [DISTWIDTH] IN BLOOD BY AUTOMATED COUNT: 35.8 FL (ref 35.9–50)
GFR SERPLBLD CREATININE-BSD FMLA CKD-EPI: 116 ML/MIN/1.73 M 2
GFR SERPLBLD CREATININE-BSD FMLA CKD-EPI: 124 ML/MIN/1.73 M 2
GFR SERPLBLD CREATININE-BSD FMLA CKD-EPI: 126 ML/MIN/1.73 M 2
GFR SERPLBLD CREATININE-BSD FMLA CKD-EPI: 130 ML/MIN/1.73 M 2
GFR SERPLBLD CREATININE-BSD FMLA CKD-EPI: 136 ML/MIN/1.73 M 2
GLOBULIN SER CALC-MCNC: 2.9 G/DL (ref 1.9–3.5)
GLUCOSE SERPL-MCNC: 101 MG/DL (ref 65–99)
GLUCOSE SERPL-MCNC: 102 MG/DL (ref 65–99)
GLUCOSE SERPL-MCNC: 105 MG/DL (ref 65–99)
GLUCOSE SERPL-MCNC: 96 MG/DL (ref 65–99)
GLUCOSE SERPL-MCNC: 97 MG/DL (ref 65–99)
GLUCOSE UR STRIP.AUTO-MCNC: NEGATIVE MG/DL
HCG SERPL QL: NEGATIVE
HCT VFR BLD AUTO: 32.3 % (ref 37–47)
HGB BLD-MCNC: 11.7 G/DL (ref 12–16)
IMM GRANULOCYTES # BLD AUTO: 0.04 K/UL (ref 0–0.11)
IMM GRANULOCYTES NFR BLD AUTO: 0.4 % (ref 0–0.9)
KETONES UR STRIP.AUTO-MCNC: 80 MG/DL
LEUKOCYTE ESTERASE UR QL STRIP.AUTO: NEGATIVE
LYMPHOCYTES # BLD AUTO: 2.08 K/UL (ref 1–4.8)
LYMPHOCYTES NFR BLD: 21.9 % (ref 22–41)
MAGNESIUM SERPL-MCNC: 1.6 MG/DL (ref 1.5–2.5)
MAGNESIUM SERPL-MCNC: 1.7 MG/DL (ref 1.5–2.5)
MCH RBC QN AUTO: 30.8 PG (ref 27–33)
MCHC RBC AUTO-ENTMCNC: 36.2 G/DL (ref 33.6–35)
MCV RBC AUTO: 85 FL (ref 81.4–97.8)
MICRO URNS: ABNORMAL
MONOCYTES # BLD AUTO: 0.78 K/UL (ref 0–0.85)
MONOCYTES NFR BLD AUTO: 8.2 % (ref 0–13.4)
NEUTROPHILS # BLD AUTO: 6.49 K/UL (ref 2–7.15)
NEUTROPHILS NFR BLD: 68.5 % (ref 44–72)
NITRITE UR QL STRIP.AUTO: NEGATIVE
NRBC # BLD AUTO: 0 K/UL
NRBC BLD-RTO: 0 /100 WBC
OSMOLALITY SERPL: 243 MOSM/KG H2O (ref 278–298)
OSMOLALITY UR: 447 MOSM/KG H2O (ref 300–900)
PH UR STRIP.AUTO: 6.5 [PH] (ref 5–8)
PHOSPHATE SERPL-MCNC: 2 MG/DL (ref 2.5–4.5)
PLATELET # BLD AUTO: 185 K/UL (ref 164–446)
PMV BLD AUTO: 10.8 FL (ref 9–12.9)
POTASSIUM SERPL-SCNC: 3.3 MMOL/L (ref 3.6–5.5)
POTASSIUM SERPL-SCNC: 4 MMOL/L (ref 3.6–5.5)
POTASSIUM SERPL-SCNC: 4.4 MMOL/L (ref 3.6–5.5)
POTASSIUM SERPL-SCNC: 4.5 MMOL/L (ref 3.6–5.5)
POTASSIUM SERPL-SCNC: 4.5 MMOL/L (ref 3.6–5.5)
PROT SERPL-MCNC: 6.5 G/DL (ref 6–8.2)
PROT UR QL STRIP: NEGATIVE MG/DL
RBC # BLD AUTO: 3.8 M/UL (ref 4.2–5.4)
RBC # URNS HPF: NORMAL /HPF
RBC UR QL AUTO: ABNORMAL
SODIUM SERPL-SCNC: 117 MMOL/L (ref 135–145)
SODIUM SERPL-SCNC: 120 MMOL/L (ref 135–145)
SODIUM SERPL-SCNC: 132 MMOL/L (ref 135–145)
SODIUM SERPL-SCNC: 136 MMOL/L (ref 135–145)
SODIUM SERPL-SCNC: 137 MMOL/L (ref 135–145)
SODIUM UR-SCNC: 129 MMOL/L
SP GR UR STRIP.AUTO: 1.01
TSH SERPL DL<=0.005 MIU/L-ACNC: 0.62 UIU/ML (ref 0.38–5.33)
UROBILINOGEN UR STRIP.AUTO-MCNC: 0.2 MG/DL
WBC # BLD AUTO: 9.5 K/UL (ref 4.8–10.8)
WBC #/AREA URNS HPF: NORMAL /HPF

## 2022-09-02 PROCEDURE — 700102 HCHG RX REV CODE 250 W/ 637 OVERRIDE(OP): Performed by: STUDENT IN AN ORGANIZED HEALTH CARE EDUCATION/TRAINING PROGRAM

## 2022-09-02 PROCEDURE — 84703 CHORIONIC GONADOTROPIN ASSAY: CPT

## 2022-09-02 PROCEDURE — 700102 HCHG RX REV CODE 250 W/ 637 OVERRIDE(OP): Performed by: INTERNAL MEDICINE

## 2022-09-02 PROCEDURE — 700102 HCHG RX REV CODE 250 W/ 637 OVERRIDE(OP): Performed by: EMERGENCY MEDICINE

## 2022-09-02 PROCEDURE — 700111 HCHG RX REV CODE 636 W/ 250 OVERRIDE (IP)

## 2022-09-02 PROCEDURE — 83930 ASSAY OF BLOOD OSMOLALITY: CPT

## 2022-09-02 PROCEDURE — A9270 NON-COVERED ITEM OR SERVICE: HCPCS | Performed by: INTERNAL MEDICINE

## 2022-09-02 PROCEDURE — 84300 ASSAY OF URINE SODIUM: CPT

## 2022-09-02 PROCEDURE — 82570 ASSAY OF URINE CREATININE: CPT

## 2022-09-02 PROCEDURE — 80053 COMPREHEN METABOLIC PANEL: CPT

## 2022-09-02 PROCEDURE — 96374 THER/PROPH/DIAG INJ IV PUSH: CPT

## 2022-09-02 PROCEDURE — A9270 NON-COVERED ITEM OR SERVICE: HCPCS | Performed by: STUDENT IN AN ORGANIZED HEALTH CARE EDUCATION/TRAINING PROGRAM

## 2022-09-02 PROCEDURE — 94760 N-INVAS EAR/PLS OXIMETRY 1: CPT

## 2022-09-02 PROCEDURE — 96375 TX/PRO/DX INJ NEW DRUG ADDON: CPT

## 2022-09-02 PROCEDURE — 84100 ASSAY OF PHOSPHORUS: CPT

## 2022-09-02 PROCEDURE — 700111 HCHG RX REV CODE 636 W/ 250 OVERRIDE (IP): Performed by: EMERGENCY MEDICINE

## 2022-09-02 PROCEDURE — 83735 ASSAY OF MAGNESIUM: CPT

## 2022-09-02 PROCEDURE — 36415 COLL VENOUS BLD VENIPUNCTURE: CPT

## 2022-09-02 PROCEDURE — 700111 HCHG RX REV CODE 636 W/ 250 OVERRIDE (IP): Performed by: STUDENT IN AN ORGANIZED HEALTH CARE EDUCATION/TRAINING PROGRAM

## 2022-09-02 PROCEDURE — 99291 CRITICAL CARE FIRST HOUR: CPT | Performed by: STUDENT IN AN ORGANIZED HEALTH CARE EDUCATION/TRAINING PROGRAM

## 2022-09-02 PROCEDURE — 85025 COMPLETE CBC W/AUTO DIFF WBC: CPT

## 2022-09-02 PROCEDURE — A9270 NON-COVERED ITEM OR SERVICE: HCPCS | Performed by: EMERGENCY MEDICINE

## 2022-09-02 PROCEDURE — 81001 URINALYSIS AUTO W/SCOPE: CPT

## 2022-09-02 PROCEDURE — 83935 ASSAY OF URINE OSMOLALITY: CPT

## 2022-09-02 PROCEDURE — 700101 HCHG RX REV CODE 250: Performed by: STUDENT IN AN ORGANIZED HEALTH CARE EDUCATION/TRAINING PROGRAM

## 2022-09-02 PROCEDURE — 99285 EMERGENCY DEPT VISIT HI MDM: CPT

## 2022-09-02 PROCEDURE — 80048 BASIC METABOLIC PNL TOTAL CA: CPT

## 2022-09-02 PROCEDURE — 700105 HCHG RX REV CODE 258: Performed by: STUDENT IN AN ORGANIZED HEALTH CARE EDUCATION/TRAINING PROGRAM

## 2022-09-02 PROCEDURE — 770001 HCHG ROOM/CARE - MED/SURG/GYN PRIV*

## 2022-09-02 PROCEDURE — 82533 TOTAL CORTISOL: CPT

## 2022-09-02 PROCEDURE — 84443 ASSAY THYROID STIM HORMONE: CPT

## 2022-09-02 PROCEDURE — 93005 ELECTROCARDIOGRAM TRACING: CPT | Performed by: EMERGENCY MEDICINE

## 2022-09-02 RX ORDER — LEVOTHYROXINE SODIUM 0.07 MG/1
75 TABLET ORAL
Status: DISCONTINUED | OUTPATIENT
Start: 2022-09-02 | End: 2022-09-03 | Stop reason: HOSPADM

## 2022-09-02 RX ORDER — MAGNESIUM SULFATE HEPTAHYDRATE 40 MG/ML
2 INJECTION, SOLUTION INTRAVENOUS ONCE
Status: COMPLETED | OUTPATIENT
Start: 2022-09-02 | End: 2022-09-02

## 2022-09-02 RX ORDER — DESMOPRESSIN ACETATE 0.1 MG/1
150 TABLET ORAL DAILY
Status: DISCONTINUED | OUTPATIENT
Start: 2022-09-02 | End: 2022-09-03 | Stop reason: HOSPADM

## 2022-09-02 RX ORDER — ONDANSETRON 2 MG/ML
4 INJECTION INTRAMUSCULAR; INTRAVENOUS ONCE
Status: COMPLETED | OUTPATIENT
Start: 2022-09-02 | End: 2022-09-02

## 2022-09-02 RX ORDER — SODIUM CHLORIDE 9 MG/ML
500 INJECTION, SOLUTION INTRAVENOUS ONCE
Status: COMPLETED | OUTPATIENT
Start: 2022-09-02 | End: 2022-09-02

## 2022-09-02 RX ORDER — POLYETHYLENE GLYCOL 3350 17 G/17G
1 POWDER, FOR SOLUTION ORAL
Status: DISCONTINUED | OUTPATIENT
Start: 2022-09-02 | End: 2022-09-03 | Stop reason: HOSPADM

## 2022-09-02 RX ORDER — PROMETHAZINE HYDROCHLORIDE 25 MG/1
12.5-25 TABLET ORAL EVERY 4 HOURS PRN
Status: DISCONTINUED | OUTPATIENT
Start: 2022-09-02 | End: 2022-09-03 | Stop reason: HOSPADM

## 2022-09-02 RX ORDER — AMOXICILLIN 250 MG
2 CAPSULE ORAL 2 TIMES DAILY
Status: DISCONTINUED | OUTPATIENT
Start: 2022-09-02 | End: 2022-09-03 | Stop reason: HOSPADM

## 2022-09-02 RX ORDER — 3% SODIUM CHLORIDE 3 G/100ML
100 INJECTION, SOLUTION INTRAVENOUS ONCE
Status: DISCONTINUED | OUTPATIENT
Start: 2022-09-02 | End: 2022-09-02

## 2022-09-02 RX ORDER — ACETAMINOPHEN 325 MG/1
650 TABLET ORAL EVERY 6 HOURS PRN
Status: DISCONTINUED | OUTPATIENT
Start: 2022-09-02 | End: 2022-09-03 | Stop reason: HOSPADM

## 2022-09-02 RX ORDER — ONDANSETRON 4 MG/1
4 TABLET, ORALLY DISINTEGRATING ORAL EVERY 4 HOURS PRN
Status: DISCONTINUED | OUTPATIENT
Start: 2022-09-02 | End: 2022-09-03 | Stop reason: HOSPADM

## 2022-09-02 RX ORDER — SODIUM CHLORIDE 9 MG/ML
INJECTION, SOLUTION INTRAVENOUS CONTINUOUS
Status: DISCONTINUED | OUTPATIENT
Start: 2022-09-02 | End: 2022-09-02

## 2022-09-02 RX ORDER — SODIUM CHLORIDE 9 MG/ML
INJECTION, SOLUTION INTRAVENOUS CONTINUOUS
Status: ACTIVE | OUTPATIENT
Start: 2022-09-02 | End: 2022-09-02

## 2022-09-02 RX ORDER — POTASSIUM CHLORIDE 20 MEQ/1
20 TABLET, EXTENDED RELEASE ORAL ONCE
Status: COMPLETED | OUTPATIENT
Start: 2022-09-02 | End: 2022-09-02

## 2022-09-02 RX ORDER — MIDAZOLAM HYDROCHLORIDE 1 MG/ML
4 INJECTION INTRAMUSCULAR; INTRAVENOUS ONCE
Status: COMPLETED | OUTPATIENT
Start: 2022-09-02 | End: 2022-09-02

## 2022-09-02 RX ORDER — HYDRALAZINE HYDROCHLORIDE 20 MG/ML
10 INJECTION INTRAMUSCULAR; INTRAVENOUS EVERY 4 HOURS PRN
Status: DISCONTINUED | OUTPATIENT
Start: 2022-09-02 | End: 2022-09-03 | Stop reason: HOSPADM

## 2022-09-02 RX ORDER — BISACODYL 10 MG
10 SUPPOSITORY, RECTAL RECTAL
Status: DISCONTINUED | OUTPATIENT
Start: 2022-09-02 | End: 2022-09-03 | Stop reason: HOSPADM

## 2022-09-02 RX ORDER — PROMETHAZINE HYDROCHLORIDE 25 MG/1
12.5-25 SUPPOSITORY RECTAL EVERY 4 HOURS PRN
Status: DISCONTINUED | OUTPATIENT
Start: 2022-09-02 | End: 2022-09-03 | Stop reason: HOSPADM

## 2022-09-02 RX ORDER — HYDROMORPHONE HYDROCHLORIDE 1 MG/ML
0.25 INJECTION, SOLUTION INTRAMUSCULAR; INTRAVENOUS; SUBCUTANEOUS
Status: DISCONTINUED | OUTPATIENT
Start: 2022-09-02 | End: 2022-09-03 | Stop reason: HOSPADM

## 2022-09-02 RX ORDER — PROCHLORPERAZINE EDISYLATE 5 MG/ML
5-10 INJECTION INTRAMUSCULAR; INTRAVENOUS EVERY 4 HOURS PRN
Status: DISCONTINUED | OUTPATIENT
Start: 2022-09-02 | End: 2022-09-03 | Stop reason: HOSPADM

## 2022-09-02 RX ORDER — DESMOPRESSIN ACETATE 0.1 MG/1
150 TABLET ORAL DAILY
Status: DISCONTINUED | OUTPATIENT
Start: 2022-09-02 | End: 2022-09-02

## 2022-09-02 RX ORDER — ENOXAPARIN SODIUM 100 MG/ML
40 INJECTION SUBCUTANEOUS DAILY
Status: DISCONTINUED | OUTPATIENT
Start: 2022-09-02 | End: 2022-09-03 | Stop reason: HOSPADM

## 2022-09-02 RX ORDER — OXYCODONE HYDROCHLORIDE 5 MG/1
2.5 TABLET ORAL
Status: DISCONTINUED | OUTPATIENT
Start: 2022-09-02 | End: 2022-09-03 | Stop reason: HOSPADM

## 2022-09-02 RX ORDER — ONDANSETRON 2 MG/ML
4 INJECTION INTRAMUSCULAR; INTRAVENOUS EVERY 4 HOURS PRN
Status: DISCONTINUED | OUTPATIENT
Start: 2022-09-02 | End: 2022-09-03 | Stop reason: HOSPADM

## 2022-09-02 RX ORDER — OXYCODONE HYDROCHLORIDE 5 MG/1
5 TABLET ORAL
Status: DISCONTINUED | OUTPATIENT
Start: 2022-09-02 | End: 2022-09-03 | Stop reason: HOSPADM

## 2022-09-02 RX ADMIN — SODIUM CHLORIDE: 9 INJECTION, SOLUTION INTRAVENOUS at 08:08

## 2022-09-02 RX ADMIN — MIDAZOLAM HYDROCHLORIDE 4 MG: 1 INJECTION, SOLUTION INTRAMUSCULAR; INTRAVENOUS at 04:09

## 2022-09-02 RX ADMIN — POTASSIUM PHOSPHATE, MONOBASIC AND POTASSIUM PHOSPHATE, DIBASIC 30 MMOL: 224; 236 INJECTION, SOLUTION, CONCENTRATE INTRAVENOUS at 11:21

## 2022-09-02 RX ADMIN — DESMOPRESSIN ACETATE 150 MCG: 0.1 TABLET ORAL at 11:21

## 2022-09-02 RX ADMIN — MAGNESIUM SULFATE HEPTAHYDRATE 2 G: 40 INJECTION, SOLUTION INTRAVENOUS at 08:08

## 2022-09-02 RX ADMIN — SODIUM CHLORIDE 500 ML: 9 INJECTION, SOLUTION INTRAVENOUS at 05:51

## 2022-09-02 RX ADMIN — ONDANSETRON 4 MG: 2 INJECTION INTRAMUSCULAR; INTRAVENOUS at 04:00

## 2022-09-02 RX ADMIN — LEVOTHYROXINE SODIUM 75 MCG: 0.07 TABLET ORAL at 07:44

## 2022-09-02 RX ADMIN — POTASSIUM CHLORIDE 20 MEQ: 1500 TABLET, EXTENDED RELEASE ORAL at 05:00

## 2022-09-02 ASSESSMENT — PATIENT HEALTH QUESTIONNAIRE - PHQ9
SUM OF ALL RESPONSES TO PHQ9 QUESTIONS 1 AND 2: 0
2. FEELING DOWN, DEPRESSED, IRRITABLE, OR HOPELESS: NOT AT ALL
2. FEELING DOWN, DEPRESSED, IRRITABLE, OR HOPELESS: NOT AT ALL
1. LITTLE INTEREST OR PLEASURE IN DOING THINGS: NOT AT ALL
1. LITTLE INTEREST OR PLEASURE IN DOING THINGS: NOT AT ALL
SUM OF ALL RESPONSES TO PHQ9 QUESTIONS 1 AND 2: 0

## 2022-09-02 ASSESSMENT — ENCOUNTER SYMPTOMS
NECK PAIN: 0
PALPITATIONS: 0
INSOMNIA: 0
VOMITING: 1
HEADACHES: 0
DIZZINESS: 0
ABDOMINAL PAIN: 0
CHILLS: 1
DIAPHORESIS: 1
DEPRESSION: 0
FOCAL WEAKNESS: 0
COUGH: 0
TREMORS: 0
MYALGIAS: 0
VOMITING: 0
SHORTNESS OF BREATH: 0
NAUSEA: 1
WEIGHT LOSS: 0
BLOOD IN STOOL: 0
BACK PAIN: 0
CONSTIPATION: 0
HEADACHES: 1
DIARRHEA: 0
WHEEZING: 0
CHILLS: 0
TINGLING: 0
WEAKNESS: 0
ORTHOPNEA: 0
NAUSEA: 0
HEARTBURN: 0
DOUBLE VISION: 0
PHOTOPHOBIA: 0
FEVER: 0
SPEECH CHANGE: 0
BRUISES/BLEEDS EASILY: 0
HALLUCINATIONS: 0
BLURRED VISION: 0
LOSS OF CONSCIOUSNESS: 0
EYE PAIN: 0
SPUTUM PRODUCTION: 0
SENSORY CHANGE: 0

## 2022-09-02 ASSESSMENT — PAIN DESCRIPTION - PAIN TYPE
TYPE: ACUTE PAIN

## 2022-09-02 ASSESSMENT — FIBROSIS 4 INDEX
FIB4 SCORE: 0.69
FIB4 SCORE: 0.69
FIB4 SCORE: 0.37

## 2022-09-02 ASSESSMENT — LIFESTYLE VARIABLES: SUBSTANCE_ABUSE: 0

## 2022-09-02 NOTE — ASSESSMENT & PLAN NOTE
She has history of diabetes.   Her sodium level improved   I decreased the frequency of BMP from every 4 hours to every 8 hourly.  Avoid overcorrection of sodium.  She has been having increased frequency of urination I started her on outpatient DDAVP.  I discussed plan of care with her.

## 2022-09-02 NOTE — CONSULTS
"Consults  Was initially called by ERP for admission of patient with acute hyponatremia,    Patient presented to ER with complaint of continued vomiting, headache, chills.  Was apparently having some confusion (told ERP she did not have marijuana today, then later said she took 3 Gummies within the night, which may also be contributing to patient's confusion).     Patient has a history of \" probable partial central DI of unknown etiology\" based on borderline polyuria in 2018, with sodium of 140, urine awesome of 61, normal brain MRI.  Continues to take DDAVP 150 mcg daily, serum sodium has been stable. recently saw endocrinology sent for labs which were done yesterday.  Sodium was 129 at that time.     Patient states she had not taken her desmopressin in the last 24 hours, and had not noticed changes in urine output recently.     And EMS patient received 800 of normal saline.  No further fluids were given in ER only received Zofran Versed and potassium     Initial labs showed sodium of 117, K of 3.3 bicarb of 14 with anion gap of 19.  Very concerning so initially was admitting to ICU, however unclear why patient sodium would dropped 12 points in 12 hours especially if she had not taken DDAVP ?  Dehydration with vomiting.   Stat repeat BMP was sent to ensure true value prior to hypertonic saline for such acute drop.     Repeat labs returned with no metabolic acidosis or anion gap and sodium had improved to 120.  Again without any intervention that should have improved any of this in the ED. unclear if initial labs were incorrect or why she would have suddenly improved so drastically.     However she was feeling much better vomiting had improved mental status had improved, sodium did increase by 3 (had 800 bolus) however     -we know 12 hours prior to initial labs patient's sodium was 129 so can increase to that level without concern quickly.     As patient's labs are improved so significantly discussed again with ERP " and hospitalist [place] [facility] [place] monitoring especially with this possible history of DI and we will be giving sodium containing fluids, patient may start to correct too quickly at which time restarting home DDAVP may be appropriate, but would hold at this time is acutely hyponatremic.   -Ordered 500 NS bolus  -Repeat sodium after bolus complete, then every 4 hours  -Likely should initiate maintenance fluids however will depend on repeat sodium.   -TSH and cortisol ordered.   -Urine lytes pending.     If patient's sodium having drastic swings, worsening mental status, or other concerns that patient may need higher level of care please feel free to recontact intensivist team

## 2022-09-02 NOTE — ASSESSMENT & PLAN NOTE
Seen by endocrinology yesterday with sodium level of 129.    She reported her last dose of DDAVP was on Wednesday.  Measured/calculated urine osmolality 243  Resume her outpatient DDAVP due to increased frequency of urination.  She need close outpatient follow-up with endocrinology.

## 2022-09-02 NOTE — ED TRIAGE NOTES
Mary Carmen Head  32 y.o.  Chief Complaint   Patient presents with    N/V     Starting around 2000 last night    Shaking     BIBA for above, pt states she feels dehydrated. Hx IBS, no complaints of diarrhea at this time, reporting headache, vomiting, sweating, shaking, and nausea. Pt had 3 THC edibles last night. Given 800ml fluids en route. A&Ox4, shaking in triage.

## 2022-09-02 NOTE — ED PROVIDER NOTES
"ED Provider Note    Scribed for Shoaib Gustafson II, M* by Sarah Montes. 9/2/2022  3:25 AM    Means of Arrival: EMS  History obtained by: patient  Limitations: none    CHIEF COMPLAINT  Chief Complaint   Patient presents with    N/V     Starting around 2000 last night    Shaking       HPI  Mary Carmen Head is a 32 y.o. female who has a history of diabetes insipidus presents to the Emergency Department for evaluation of acute persistent vomiting onset tonight. This morning Mireille suddenly became moderately nauseous. She has had multiple emesis episodes. She has associated symptoms of a generalized headache, nausea, and diaphoresis. She denies any fever, chills, chest pain, or shortness of breath. EMS was called and she was given 800 cc of NS en route. No alleviating or exacerbating factors were reported. Per EMS report she had 3 THC edibles. When the patient was asked how much she took she states \"I did not take any edibles tonight, I just took some between 5 PM and 2 AM\". She states that she intermittently uses marijuana. She takes desmopressin for diabetes insipidus. She states her last dose was over 24 hours ago. She denies any increased in urinary frequency and states she has been urinating at a normal rate.     Reviewed old medical records on 9/1/22 shows she had lab work done. Lab work shows a Sodium is 129. Potassium 4.1. Chloride 95. Co2 23. Bun 7. Creatinine 0.52. Calcium 8.7.     REVIEW OF SYSTEMS  Review of Systems   Constitutional:  Positive for diaphoresis. Negative for chills and fever.   Respiratory:  Negative for shortness of breath.    Cardiovascular:  Negative for chest pain.   Gastrointestinal:  Positive for nausea and vomiting.   Genitourinary:  Negative for frequency.   Neurological:  Positive for headaches.   All other systems reviewed and are negative.  See HPI for further details.     PAST MEDICAL HISTORY   has a past medical history of Breath shortness (11/03/2021), Diabetes " "insipidus (HCC), Heart burn, History of IBS, Hypothyroidism, Indigestion, PMDD (premenstrual dysphoric disorder), and Vitamin D deficiency (2018).    SOCIAL HISTORY  Social History     Tobacco Use    Smoking status: Former     Types: Cigarettes     Quit date: 2018     Years since quittin.6    Smokeless tobacco: Never   Vaping Use    Vaping Use: Never used   Substance and Sexual Activity    Alcohol use: Not Currently    Drug use: Yes     Types: Oral, Marijuana     Comment: thc edibles    Sexual activity: Not Currently       SURGICAL HISTORY   has a past surgical history that includes dental extraction(s) and abilio by laparoscopy (N/A, 2021).    CURRENT MEDICATIONS  Home Medications       Reviewed by Eder VanD (Pharmacist) on 22 at 0653  Med List Status: Complete     Medication Last Dose Status   ascorbic acid (ASCORBIC ACID) 500 MG Tab  Active   Cholecalciferol (VITAMIN D3) 1.25 MG (68948 UT) Cap  Active   coenzyme Q-10 30 MG capsule  Active   Cyanocobalamin (VITAMIN B-12 PO)  Active   desmopressin (DDAVP) 0.1 MG Tab 2022 Active   Ferrous Sulfate (IRON PO)  Active   LORYNA 3-0.02 MG per tablet  Active   Magnesium 500 MG Cap  Active   Multiple Vitamins-Minerals (HAIR/SKIN/NAILS) Tab  Active   Multiple Vitamins-Minerals (OCUVITE-LUTEIN) Tab  Active   nitrofurantoin (MACROBID) 100 MG Cap  Active   Probiotic Product (PROBIOTIC PO)  Active   SYNTHROID 75 MCG Tab 2022 Active   vitamin D2, Ergocalciferol, (DRISDOL) 1.25 MG (98547 UT) Cap capsule  Active                    ALLERGIES  Allergies   Allergen Reactions    Claritin Palpitations     Heart palpitations     Epinephrine Palpitations       PHYSICAL EXAM  VITAL SIGNS: /66   Pulse 90   Temp 36.9 °C (98.4 °F) (Temporal)   Resp 19   Ht 1.651 m (5' 5\")   Wt 59 kg (130 lb)   SpO2 99%   BMI 21.63 kg/m²    Pulse ox interpretation: I interpret this pulse ox as normal.  Constitutional: Alert well nourished 32 y.o. woman " holding emesis bag in hand, actively vomiting.  HENT: No signs of trauma, Bilateral external ears normal, Nose normal.   Eyes: Pupils are equal, Conjunctiva normal, Non-icteric.   Neck: Normal range of motion, No tenderness, Supple, No stridor.   Cardiovascular: Regular rate and rhythm, no murmurs. Symmetric distal pulses. No cyanosis of extremities. No peripheral edema of extremities.  Thorax & Lungs: Normal breath sounds, No respiratory distress, No wheezing, No chest tenderness.   Abdomen: Soft, No tenderness, No masses, No pulsatile masses. No peritoneal signs.  Skin: Warm, Dry, No erythema, No rash.   Back: No midline bony tenderness, No CVA tenderness.   Musculoskeletal: Good range of motion in all major joints. No tenderness to palpation or major deformities noted.   Neurologic: Alert oriented to place and situation. Having trouble with timline and providing history of recent events. Speech clear. No ataxia. Following commands. Moving all extremities.   Psychiatric: Mood normal.     DIAGNOSTIC STUDIES / PROCEDURES    LABS  Pertinent Labs & Imaging studies reviewed. (See chart for details)  Labs Reviewed   CBC WITH DIFFERENTIAL - Abnormal; Notable for the following components:       Result Value    RBC 3.80 (*)     Hemoglobin 11.7 (*)     Hematocrit 32.3 (*)     MCHC 36.2 (*)     RDW 35.8 (*)     Lymphocytes 21.90 (*)     All other components within normal limits   COMP METABOLIC PANEL - Abnormal; Notable for the following components:    Sodium 117 (*)     Potassium 3.3 (*)     Chloride 84 (*)     Co2 14 (*)     Anion Gap 19.0 (*)     Glucose 105 (*)     Bun 4 (*)     All other components within normal limits   OSMOLALITY SERUM - Abnormal; Notable for the following components:    Osmolality Serum 243 (*)     All other components within normal limits   BASIC METABOLIC PANEL - Abnormal; Notable for the following components:    Sodium 120 (*)     Chloride 86 (*)     Glucose 102 (*)     Bun 4 (*)     Creatinine  0.39 (*)     All other components within normal limits   PHOSPHORUS - Abnormal; Notable for the following components:    Phosphorus 2.0 (*)     All other components within normal limits   CORTISOL - Abnormal; Notable for the following components:    Cortisol 36.8 (*)     All other components within normal limits   URINALYSIS - Abnormal; Notable for the following components:    Ketones 80 (*)     Occult Blood Small (*)     All other components within normal limits   HCG QUAL SERUM   ESTIMATED GFR   URINE SODIUM RANDOM    Narrative:     FENa = (UrineNa / SerumNA) / (UrineCr / SerumCr) *100   URINE CREATININE RANDOM    Narrative:     FENa = (UrineNa / SerumNA) / (UrineCr / SerumCr) *100   OSMOLALITY URINE    Narrative:     FENa = (UrineNa / SerumNA) / (UrineCr / SerumCr) *100   MAGNESIUM   TSH WITH REFLEX TO FT4   ESTIMATED GFR   URINE MICROSCOPIC (W/UA)   MAGNESIUM   BASIC METABOLIC PANEL   BASIC METABOLIC PANEL       COURSE & MEDICAL DECISION MAKING  Pertinent Labs & Imaging studies reviewed. (See chart for details)    3:25 AM This is an emergent evaluation of a 32 y.o., female who presents with acute vomiting and the differential diagnosis includes but is not limited to cyclic vomiting, cannabis overdose, dehydration, pregnancy, electrolyte abnormalities. Ordered for HCG Qual, CMP, and CBC with diff to evaluate. Patient will be treated with Zofran 4 mg injection and Versed 4 mg injection for nausea and vomiting.    4:40 AM Patient's sodium is 117. With low bicarb and anion gap. Unexpected finding for history of vomiting just a few hours. Also unusual that sodium this low, she has not had a prior problem with low sodium. Ordered for Urine Creatinine, Urine Sodium, Phosphorus, and Magnesium.    4:52 AM Patient was reevaluated at bedside. Her nausea has improved. Discussed lab results with the patient. I informed her of plans for hospitalization. Patient verbalizes understanding and agreement to this plan of care.  Patient will be treated with potassium chloride SA 20 mEq tablet for low sodium. Ordered for repeat BMP.     5:14 AM I discussed the patient's case and the above findings with Dr. Camacho (Intensivist) who agrees to hospitalize the patient for electrolyte derrangements.     5:44 AM  Repeat BMP significantly different. Sodium still low 120. But electrolytes otherwise within acceptable limits. Dr. Camacho will discuss with hospitalist for IMCU admission.     CRITICAL CARE  The very real possibilty of a deterioration of this patient's condition required the highest level of my preparedness for sudden, emergent intervention.  I provided critical care services, which included medication orders, frequent reevaluations of the patient's condition and response to treatment, ordering and reviewing test results, and discussing the case with various consultants.  The critical care time associated with the care of the patient was 30 minutes. Review chart for interventions. This time is exclusive of any other billable procedures.     DISPOSITION:  Patient will be hospitalizedin guarded condition.    FINAL IMPRESSION  1. Non-intractable vomiting with nausea, unspecified vomiting type    2. Hyponatremia    3. Hx of diabetes insipidus           Sarah ROSE ([place]), am scribing for, and in the presence of, ADILSON Garces II.    Electronically signed by: Sarah Montes ([place]), 9/2/2022    Shoaib ROSE II, M* personally performed the services described in this documentation, as scribed by Sarah Montes in my presence, and it is both accurate and complete.    The note accurately reflects work and decisions made by me.  Shoaib Gustafson II, M.D.  9/2/2022  5:50 AM

## 2022-09-02 NOTE — PROGRESS NOTES
Hospital Medicine Daily Progress Note    Date of Service  9/2/2022    Chief Complaint    Mary Carmen Head is a 32 y.o. female admitted 9/2/2022 with hyponatremia    Hospital Course  Mary Carmen Head is a 32 y.o. 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  She went to visit her endocrinologist yesterday and sodium level noted to be 129.  Patient states that she had consumed marijuana THC Gummies approximately 3 of them and had multiple episodes of nausea vomiting.  She believes she may have missed her dose of desmopressin however it is possible that maybe she is taking more than 2 doses.  Story is somewhat inconsistent between providers as she had told ERP she had missed a dose of her DDAVP without significant polyuria however when I asked her self about her urine output she said she had missed her DDAVP and had significant urine output within the past 24 hours.  Her sodium was 129 on September 1 and repeat sodium are subbed over the second came back 117.  She admitted to [facility] for close monitoring and she was started on IV fluid.  Her sodium corrected to normal to 136.  As her baseline sodium was 129 is not overcorrection from her previous sodium.  I started her on DDAVP her outpatient medication as she has been having frequency of urination.  She reported her last dose of DDAVP was on Wednesday.  Patient is now stable to transfer to medical floor for further management and monitoring.    Interval Problem Update  09/02/22  I evaluated and examined her at the bedside.  She was admitted this morning.  She reported that she is feeling better.  She expressed that her last dose of DDAVP was on Wednesday.  She has been having increased frequency of urination with  Her sodium came back this morning 132 and later this afternoon it is 136.  Her last sodium on September 1 was 129 so she will not consider it as  overcorrection too quickly.  I discussed plan of care with her plan is to transfer her to medical floor if her sodium level remained stable with the use of DDAVP plan is to possibly discharge tomorrow.    I have discussed this patient's plan of care and discharge plan at IDT rounds today with Case Management, Nursing, Nursing leadership, and other members of the IDT team.    Consultants/Specialty  critical care    Code Status  Full Code    Disposition  Patient is not medically cleared for discharge.   Anticipate discharge to to home with close outpatient follow-up.  I have placed the appropriate orders for post-discharge needs.    Review of Systems  Review of Systems   Constitutional:  Positive for malaise/fatigue. Negative for chills, fever and weight loss.   HENT:  Negative for hearing loss and tinnitus.    Eyes:  Negative for blurred vision, double vision, photophobia and pain.   Respiratory:  Negative for cough, sputum production and shortness of breath.    Cardiovascular:  Negative for chest pain, palpitations, orthopnea and leg swelling.   Gastrointestinal:  Negative for abdominal pain, constipation, diarrhea, nausea and vomiting.   Genitourinary:  Negative for dysuria, frequency and urgency.   Musculoskeletal:  Negative for back pain, joint pain, myalgias and neck pain.   Skin:  Negative for rash.   Neurological:  Negative for dizziness, tingling, tremors, sensory change, speech change, focal weakness and headaches.   Psychiatric/Behavioral:  Negative for hallucinations and substance abuse.    All other systems reviewed and are negative.     Physical Exam  Temp:  [36.1 °C (96.9 °F)-36.9 °C (98.4 °F)] 36.7 °C (98.1 °F)  Pulse:  [54-90] 66  Resp:  [18-19] 18  BP: (101-120)/(55-66) 120/60  SpO2:  [95 %-100 %] 96 %    Physical Exam  Vitals reviewed.   Constitutional:       General: She is not in acute distress.     Appearance: Normal appearance. She is not ill-appearing.      Comments: She was laying in bed  without any acute distress.   HENT:      Head: Normocephalic and atraumatic.      Nose: No congestion.   Eyes:      General:         Right eye: No discharge.         Left eye: No discharge.      Pupils: Pupils are equal, round, and reactive to light.   Cardiovascular:      Rate and Rhythm: Normal rate and regular rhythm.      Pulses: Normal pulses.      Heart sounds: Normal heart sounds. No murmur heard.  Pulmonary:      Effort: Pulmonary effort is normal. No respiratory distress.      Breath sounds: Normal breath sounds. No stridor.   Abdominal:      General: Bowel sounds are normal. There is no distension.      Palpations: Abdomen is soft.      Tenderness: There is no abdominal tenderness.   Musculoskeletal:         General: No swelling or tenderness. Normal range of motion.      Cervical back: Normal range of motion. No rigidity.   Skin:     General: Skin is warm.      Capillary Refill: Capillary refill takes less than 2 seconds.      Coloration: Skin is not jaundiced or pale.      Findings: No bruising.   Neurological:      General: No focal deficit present.      Mental Status: She is alert and oriented to person, place, and time.      Cranial Nerves: No cranial nerve deficit.   Psychiatric:         Mood and Affect: Mood normal.         Behavior: Behavior normal.       Fluids    Intake/Output Summary (Last 24 hours) at 9/2/2022 1339  Last data filed at 9/2/2022 0847  Gross per 24 hour   Intake 120 ml   Output 1150 ml   Net -1030 ml       Laboratory  Recent Labs     09/02/22  0321   WBC 9.5   RBC 3.80*   HEMOGLOBIN 11.7*   HEMATOCRIT 32.3*   MCV 85.0   MCH 30.8   MCHC 36.2*   RDW 35.8*   PLATELETCT 185   MPV 10.8     Recent Labs     09/02/22  0505 09/02/22  0920 09/02/22  1300   SODIUM 120* 132* 136   POTASSIUM 4.0 4.4 4.5   CHLORIDE 86* 99 103   CO2 21 22 23   GLUCOSE 102* 97 96   BUN 4* 4* 5*   CREATININE 0.39* 0.47* 0.58   CALCIUM 8.6 9.2 8.6                   Imaging  No orders to display         Assessment/Plan  * Hyponatremia- (present on admission)  Assessment & Plan  She has history of diabetes.   Her sodium level improved   I decreased the frequency of BMP from every 4 hours to every 8 hourly.  Avoid overcorrection of sodium.  She has been having increased frequency of urination I started her on outpatient DDAVP.  I discussed plan of care with her.        Acquired hypothyroidism- (present on admission)  Assessment & Plan  TSH and T4 are within normal limits.     Continue Synthroid 75 mcg p.o. daily with avoidance of supplemental calcium coadministration for optimal absorption.  Follow-up outpatient endocrinologist and PCP after discharge.    Diabetes insipidus (HCC)- (present on admission)  Assessment & Plan  Seen by endocrinology yesterday with sodium level of 129.    She reported her last dose of DDAVP was on Wednesday.  Measured/calculated urine osmolality 243  Resume her outpatient DDAVP due to increased frequency of urination.  She need close outpatient follow-up with endocrinology.    I discussed plan of care during IDT rounds regarding her current medical condition.          VTE prophylaxis: enoxaparin ppx    I have performed a physical exam and reviewed and updated ROS and Plan today (9/2/2022). In review of yesterday's note (9/1/2022), there are no changes except as documented above.

## 2022-09-02 NOTE — ASSESSMENT & PLAN NOTE
TSH and T4 are within normal limits.     Continue Synthroid 75 mcg p.o. daily with avoidance of supplemental calcium coadministration for optimal absorption.  Follow-up outpatient endocrinologist and PCP after discharge.

## 2022-09-02 NOTE — DISCHARGE PLANNING
-- DO NOT REPLY / DO NOT REPLY ALL --  -- Message is from the [facility]--    Referral Request  Name of Specialist: Dr. Pedro Echols  Provider's specialty: Orthopedics    Medical condition for referral:  Fluid in Knee    Patient is scheduled on 1/22/21    Is this a NEW request?: yes      Referral ordered by: ER physicians      Insurance type:       Payor:  [employer] / Plan: [employer] BA [id number] / Product Type:  BLUE ADVANTAGE      Preferred Delivery Method   Call when ready for pickup - phone number to notify: [phone] and Fax - number to send to: [phone]    Caller Information       Type Contact Phone    01/19/2021 01:38 PM CST Phone (Incoming) Mable Tian (Self) [phone] (M)          Alternative phone number: none    Turnaround time given to caller:   \"This message will be sent to [state Provider's full name]. The clinical team will return your call as soon as they review your message. Typically, it takes 3 business days to process referral requests.\"   [facility] TCN chart review completed. Collaborated with SHAQUILLE Nuñez prior to meeting with the pt. The most current review of medical record, knowledge of pt's PLOF and social support, LACE+ score of 22, 6 clicks scores of ( none entered)  mobility were considered.      Pt seen at bedside,AOX4.   Mbr endorses living alone, high functioning, independent with mobility and ADLS . No choice forms obtained during this visit given that mbr has no functional concerns at this time.     Introduced TCN program. Provided education regarding post acute levels of care. Discussed HTH/[facility]P plan benefits (Meds to Beds, medical uber and [facility] transitional care). Pt verbalizes understanding.   Anticipate possible DC to home with outpatient follow up once medically cleared.   Anticipate  no additional TCN needs at this time.  TCN will continue to follow and collaborate with discharge planning team as additional post acute needs arise. Thank you.     Completed today:  -TCN called [phone] to set up PCP follow-up after discharge  Choice obtained: NONE  [facility] referral ( Not sent) LACE 22

## 2022-09-02 NOTE — H&P
Hospital Medicine History & Physical Note    Date of Service  9/2/2022    Primary Care Physician  ADEBAYO David.    Consultants  Intensivist Dr. Camacho    Code Status  Full Code    Chief Complaint  Chief Complaint   Patient presents with    N/V     Starting around 2000 last night    Shaking       History of Presenting Illness  Mary Carmen Head is a 32 y.o. 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  She went to visit her endocrinologist yesterday and sodium level noted to be 129.  Patient states that she had consumed marijuana THC Gummies approximately 3 of them and had multiple episodes of nausea vomiting.  She believes she may have missed her dose of desmopressin however it is possible that maybe she is taking more than 2 doses.  Story is somewhat inconsistent between providers as she had told ERP she had missed a dose of her DDAVP without significant polyuria however when I asked her self about her urine output she said she had missed her DDAVP and had significant urine output within the past 24 hours.  She does state that she has a history of hypothyroidism and also does complain of consistent cold intolerance, fatigue, bradycardia.  She was noted to be bradycardic on evaluation at bedside.  She otherwise denies being vaccinated for COVID-19 and denies any signs or symptoms of COVID-19 including anosmia, ageusia, fevers, chills, cough with sputum production, myalgias or diarrheal illness.  She has had multiple episodes of vomiting in ED and complained of bilious vomiting earlier this morning.    Vital signs in ED were as follows: 98.4, 90, 19, 108/66, 99% room air.    CBC with differential reveals mild normocytic anemia otherwise relatively unremarkable without signs of infection.  Beta-hCG was noted to be negative.  CMP initially revealed moderate to severe hyponatremia of 117, hypokalemia of 3.3,  anion gap metabolic acidosis with anion gap of 19 and CO2 of 14.  She was given 1 bolus of 500 cc normal saline and subsequently had improvement of her metabolic acidosis and hyponatremia.  Measured and serum osmolality both 243.  Phosphorus noted to be low at 2.0 and subsequent replenishment has been ordered.  Urine sodium noted to be 129.  Urine osmolality at 447.  Urinalysis reveals mild ketosis and otherwise relatively unremarkable without signs of infection.  TSH within normal limits and magnesium within normal limits.    Intensivist consulted for admission.  Hospitalist consulted for admission.  Patient agreeable to inpatient hospitalization for management of her diabetes insipidus with concurrent moderate severe hyponatremia.  She agrees to full CODE STATUS at time of evaluation and alert and oriented x4 she will be optimized in ED and subsequently transferred to IMCU for further optimization medical management.    I discussed the plan of care with patient and bedside RN.    Review of Systems  Review of Systems   Constitutional:  Positive for chills and malaise/fatigue. Negative for fever.   HENT:  Negative for congestion.    Eyes:  Negative for blurred vision and double vision.   Respiratory:  Negative for cough, shortness of breath and wheezing.    Cardiovascular:  Negative for chest pain and palpitations.   Gastrointestinal:  Positive for nausea and vomiting. Negative for abdominal pain, blood in stool, constipation, diarrhea, heartburn and melena.   Genitourinary:  Positive for frequency. Negative for dysuria.   Musculoskeletal:  Negative for back pain and neck pain.   Skin:  Negative for itching and rash.   Neurological:  Negative for focal weakness, loss of consciousness, weakness and headaches.   Endo/Heme/Allergies:  Negative for environmental allergies. Does not bruise/bleed easily.   Psychiatric/Behavioral:  Negative for depression. The patient does not have insomnia.      Past Medical History   has  a past medical history of Breath shortness (11/03/2021), Diabetes insipidus (HCC), Heart burn, History of IBS, Hypothyroidism, Indigestion, PMDD (premenstrual dysphoric disorder), and Vitamin D deficiency (12/12/2018).    Surgical History   has a past surgical history that includes dental extraction(s) and abilio by laparoscopy (N/A, 11/5/2021).     Family History  family history includes Heart Disease in her maternal grandfather, maternal grandmother, and paternal grandmother; Hypertension in her mother; Prostate cancer in her paternal grandfather; Scoliosis in her paternal grandmother; Thyroid in her mother.   Family history reviewed with patient. There is no family history that is pertinent to the chief complaint.     Social History   reports that she quit smoking about 4 years ago. Her smoking use included cigarettes. She has never used smokeless tobacco. She reports that she does not currently use alcohol. She reports current drug use. Drugs: Oral and Marijuana.    Allergies  Allergies   Allergen Reactions    Claritin Palpitations     Heart palpitations     Epinephrine Palpitations       Medications  Prior to Admission Medications   Prescriptions Last Dose Informant Patient Reported? Taking?   Cholecalciferol (VITAMIN D3) 1.25 MG (37664 UT) Cap   No No   Sig: TAKE 1 CAPSULE BY MOUTH EVERY 14 DAYS.   Cyanocobalamin (VITAMIN B-12 PO)  Patient Yes No   Sig: Take 1 Tablet by mouth every day.   Ferrous Sulfate (IRON PO)   Yes No   LORYNA 3-0.02 MG per tablet   No No   Sig: TAKE 1 TABLET BY MOUTH EVERY DAY   Magnesium 500 MG Cap   Yes No   Multiple Vitamins-Minerals (HAIR/SKIN/NAILS) Tab  Patient Yes No   Sig: Take 1 Tab by mouth every day.   Multiple Vitamins-Minerals (OCUVITE-LUTEIN) Tab  Patient Yes No   Sig: Take 1 tablet by mouth every day.   Probiotic Product (PROBIOTIC PO)  Patient Yes No   Sig: Take 1 Cap by mouth every day.   SYNTHROID 75 MCG Tab 9/1/2022  No No   Sig: TAKE 1 TABLET BY MOUTH EVERY DAY IN THE  MORNING ON AN EMPTY STOMACH   ascorbic acid (ASCORBIC ACID) 500 MG Tab  Patient Yes No   Sig: Take 500 mg by mouth every day.   coenzyme Q-10 30 MG capsule  Patient Yes No   Sig: Take 60 mg by mouth every day.   desmopressin (DDAVP) 0.1 MG Tab 8/31/2022  No No   Sig: TAKE 1.5 TABLETS BY MOUTH EVERY BEDTIME.   nitrofurantoin (MACROBID) 100 MG Cap   No No   Sig: TAKE 1 CAPSULE BY MOUTH TWICE A DAY FOR 5 DAYS   Patient not taking: Reported on 7/13/2022   vitamin D2, Ergocalciferol, (DRISDOL) 1.25 MG (34769 UT) Cap capsule   No No   Sig: Take 1 Capsule by mouth every 14 days.      Facility-Administered Medications: None       Physical Exam  Temp:  [36.1 °C (96.9 °F)-36.9 °C (98.4 °F)] 36.1 °C (96.9 °F)  Pulse:  [54-90] 58  Resp:  [18-19] 18  BP: (101-108)/(56-66) 107/56  SpO2:  [99 %-100 %] 100 %  Blood Pressure: 107/56   Temperature: 36.1 °C (96.9 °F)   Pulse: (!) 58   Respiration: 18   Pulse Oximetry: 100 %       Physical Exam  Constitutional:       Appearance: Normal appearance.   HENT:      Head: Normocephalic and atraumatic.      Mouth/Throat:      Mouth: Mucous membranes are dry.      Pharynx: Oropharynx is clear. No posterior oropharyngeal erythema.   Eyes:      General: No scleral icterus.     Extraocular Movements: Extraocular movements intact.      Conjunctiva/sclera: Conjunctivae normal.      Pupils: Pupils are equal, round, and reactive to light.   Neck:      Vascular: No carotid bruit.   Cardiovascular:      Rate and Rhythm: Regular rhythm. Bradycardia present.      Pulses: Normal pulses.      Heart sounds: Normal heart sounds. No murmur heard.    No friction rub. No gallop.   Pulmonary:      Effort: Pulmonary effort is normal.      Breath sounds: Normal breath sounds. No wheezing, rhonchi or rales.   Abdominal:      General: Abdomen is flat. Bowel sounds are normal. There is no distension.      Palpations: There is no mass.      Tenderness: There is no abdominal tenderness. There is no rebound.    Musculoskeletal:         General: No swelling. Normal range of motion.      Cervical back: Normal range of motion.      Right lower leg: No edema.      Left lower leg: No edema.   Lymphadenopathy:      Cervical: No cervical adenopathy.   Skin:     General: Skin is warm and dry.      Capillary Refill: Capillary refill takes less than 2 seconds.      Findings: No erythema or rash.   Neurological:      General: No focal deficit present.      Mental Status: She is alert and oriented to person, place, and time. Mental status is at baseline.      Cranial Nerves: No cranial nerve deficit.      Sensory: No sensory deficit.      Motor: No weakness.   Psychiatric:         Mood and Affect: Mood normal.         Behavior: Behavior normal.       Laboratory:  Recent Labs     09/02/22  0321   WBC 9.5   RBC 3.80*   HEMOGLOBIN 11.7*   HEMATOCRIT 32.3*   MCV 85.0   MCH 30.8   MCHC 36.2*   RDW 35.8*   PLATELETCT 185   MPV 10.8     Recent Labs     09/01/22  1602 09/02/22  0321 09/02/22  0505   SODIUM 129* 117* 120*   POTASSIUM 4.1 3.3* 4.0   CHLORIDE 95* 84* 86*   CO2 23 14* 21   GLUCOSE 90 105* 102*   BUN 7* 4* 4*   CREATININE 0.52 0.53 0.39*   CALCIUM 8.7 8.8 8.6     Recent Labs     09/01/22  1602 09/02/22  0321 09/02/22  0505   ALTSGPT 17 17  --    ASTSGOT 11* 17  --    ALKPHOSPHAT 38 35  --    TBILIRUBIN 0.2 0.7  --    GLUCOSE 90 105* 102*         No results for input(s): NTPROBNP in the last 72 hours.      No results for input(s): TROPONINT in the last 72 hours.    Imaging:  No orders to display           Assessment/Plan:  Justification for Admission Status  I anticipate this patient will require at least two midnights for appropriate medical management, necessitating inpatient admission because she will need optimization of her hyponatremia management of her diabetes insipidus.      * Hyponatremia- (present on admission)  Assessment & Plan  I had noticed on the problem list patient also has a history of psychogenic polydipsia as  well as for diabetes insipidus and may have confounding lab results as seen above.  Every 4 hour BMP for management of her hyponatremia.  She currently is asymptomatic at this time  500 cc bolus x2 normal saline given in ED and we will continue with low infusion and every 4 BMP check  We will hold her desmopressin at this time unless she is expected to have massive diuresis and will require further central inhibition      Acquired hypothyroidism- (present on admission)  Assessment & Plan  TSH within normal limits, free T4 ordered and currently pending.  She does complain of cold intolerance, constipation, bradycardia which is apparent on physical exam and telemetry monitoring.  Continue Synthroid 75 mcg p.o. daily with avoidance of supplemental calcium coadministration for optimal absorption.  Follow-up outpatient endocrinologist and PCP after discharge.    Diabetes insipidus (HCC)- (present on admission)  Assessment & Plan  Seen by endocrinology yesterday with sodium level of 129.  Questionable  if she had taken her desmopressin or not as she had consumed THC Gummies and possibly confused at time of medication administration.  Urine sodium ordered  Measured/calculated urine osmolality 243  Continue management of her hyponatremia with holding her desmopressin currently.  If she has massive urine output we can consider reinitiation however goal is to maintain her sodium levels within normal limits for her to prepare for discharge on outpatient basis and follow-up with endocrinology in outpatient setting.      VTE prophylaxis: enoxaparin ppx    I spent greater than 35 minutes of critical care time evaluating and treating patient excluding any procedures.

## 2022-09-02 NOTE — ED NOTES
"Report given to [facility] nurse and patient transported up to [facility] in stable condition.   /57   Pulse (!) 54   Temp 36.9 °C (98.4 °F) (Temporal)   Resp 19   Ht 1.651 m (5' 5\")   Wt 59 kg (130 lb)   SpO2 100%   BMI 21.63 kg/m²    "

## 2022-09-03 VITALS
HEIGHT: 65 IN | RESPIRATION RATE: 16 BRPM | BODY MASS INDEX: 20.39 KG/M2 | SYSTOLIC BLOOD PRESSURE: 96 MMHG | OXYGEN SATURATION: 97 % | TEMPERATURE: 96.7 F | WEIGHT: 122.36 LBS | HEART RATE: 62 BPM | DIASTOLIC BLOOD PRESSURE: 55 MMHG

## 2022-09-03 PROBLEM — E87.1 HYPONATREMIA: Status: RESOLVED | Noted: 2022-09-02 | Resolved: 2022-09-03

## 2022-09-03 LAB
ANION GAP SERPL CALC-SCNC: 11 MMOL/L (ref 7–16)
ANION GAP SERPL CALC-SCNC: 11 MMOL/L (ref 7–16)
BASOPHILS # BLD AUTO: 0.5 % (ref 0–1.8)
BASOPHILS # BLD: 0.04 K/UL (ref 0–0.12)
BUN SERPL-MCNC: 7 MG/DL (ref 8–22)
BUN SERPL-MCNC: 8 MG/DL (ref 8–22)
CALCIUM SERPL-MCNC: 8.8 MG/DL (ref 8.5–10.5)
CALCIUM SERPL-MCNC: 9 MG/DL (ref 8.5–10.5)
CHLORIDE SERPL-SCNC: 102 MMOL/L (ref 96–112)
CHLORIDE SERPL-SCNC: 104 MMOL/L (ref 96–112)
CO2 SERPL-SCNC: 21 MMOL/L (ref 20–33)
CO2 SERPL-SCNC: 22 MMOL/L (ref 20–33)
CREAT SERPL-MCNC: 0.69 MG/DL (ref 0.5–1.4)
CREAT SERPL-MCNC: 0.73 MG/DL (ref 0.5–1.4)
EOSINOPHIL # BLD AUTO: 0.08 K/UL (ref 0–0.51)
EOSINOPHIL NFR BLD: 1 % (ref 0–6.9)
ERYTHROCYTE [DISTWIDTH] IN BLOOD BY AUTOMATED COUNT: 38.7 FL (ref 35.9–50)
GFR SERPLBLD CREATININE-BSD FMLA CKD-EPI: 112 ML/MIN/1.73 M 2
GFR SERPLBLD CREATININE-BSD FMLA CKD-EPI: 119 ML/MIN/1.73 M 2
GLUCOSE SERPL-MCNC: 88 MG/DL (ref 65–99)
GLUCOSE SERPL-MCNC: 89 MG/DL (ref 65–99)
HCT VFR BLD AUTO: 35.2 % (ref 37–47)
HGB BLD-MCNC: 12.4 G/DL (ref 12–16)
IMM GRANULOCYTES # BLD AUTO: 0.05 K/UL (ref 0–0.11)
IMM GRANULOCYTES NFR BLD AUTO: 0.6 % (ref 0–0.9)
LYMPHOCYTES # BLD AUTO: 1.64 K/UL (ref 1–4.8)
LYMPHOCYTES NFR BLD: 20.6 % (ref 22–41)
MCH RBC QN AUTO: 31.2 PG (ref 27–33)
MCHC RBC AUTO-ENTMCNC: 35.2 G/DL (ref 33.6–35)
MCV RBC AUTO: 88.7 FL (ref 81.4–97.8)
MONOCYTES # BLD AUTO: 0.77 K/UL (ref 0–0.85)
MONOCYTES NFR BLD AUTO: 9.6 % (ref 0–13.4)
NEUTROPHILS # BLD AUTO: 5.4 K/UL (ref 2–7.15)
NEUTROPHILS NFR BLD: 67.7 % (ref 44–72)
NRBC # BLD AUTO: 0 K/UL
NRBC BLD-RTO: 0 /100 WBC
PLATELET # BLD AUTO: 136 K/UL (ref 164–446)
PMV BLD AUTO: 10.7 FL (ref 9–12.9)
POTASSIUM SERPL-SCNC: 3.9 MMOL/L (ref 3.6–5.5)
POTASSIUM SERPL-SCNC: 4.1 MMOL/L (ref 3.6–5.5)
RBC # BLD AUTO: 3.97 M/UL (ref 4.2–5.4)
SODIUM SERPL-SCNC: 134 MMOL/L (ref 135–145)
SODIUM SERPL-SCNC: 137 MMOL/L (ref 135–145)
WBC # BLD AUTO: 8 K/UL (ref 4.8–10.8)

## 2022-09-03 PROCEDURE — A9270 NON-COVERED ITEM OR SERVICE: HCPCS | Performed by: INTERNAL MEDICINE

## 2022-09-03 PROCEDURE — A9270 NON-COVERED ITEM OR SERVICE: HCPCS | Performed by: STUDENT IN AN ORGANIZED HEALTH CARE EDUCATION/TRAINING PROGRAM

## 2022-09-03 PROCEDURE — 700102 HCHG RX REV CODE 250 W/ 637 OVERRIDE(OP): Performed by: STUDENT IN AN ORGANIZED HEALTH CARE EDUCATION/TRAINING PROGRAM

## 2022-09-03 PROCEDURE — 80048 BASIC METABOLIC PNL TOTAL CA: CPT

## 2022-09-03 PROCEDURE — 99239 HOSP IP/OBS DSCHRG MGMT >30: CPT | Performed by: INTERNAL MEDICINE

## 2022-09-03 PROCEDURE — 700102 HCHG RX REV CODE 250 W/ 637 OVERRIDE(OP): Performed by: INTERNAL MEDICINE

## 2022-09-03 PROCEDURE — 85025 COMPLETE CBC W/AUTO DIFF WBC: CPT

## 2022-09-03 RX ADMIN — LEVOTHYROXINE SODIUM 75 MCG: 0.07 TABLET ORAL at 04:07

## 2022-09-03 RX ADMIN — DESMOPRESSIN ACETATE 150 MCG: 0.1 TABLET ORAL at 04:06

## 2022-09-03 ASSESSMENT — PAIN DESCRIPTION - PAIN TYPE
TYPE: ACUTE PAIN
TYPE: ACUTE PAIN

## 2022-09-03 ASSESSMENT — FIBROSIS 4 INDEX: FIB4 SCORE: 0.69

## 2022-09-03 NOTE — DISCHARGE INSTRUCTIONS
DISCHARGE INSTRUCTIONS PER Yuridia Champion M.D.    Diagnosis: Hyponatremia    Please take your medication as prescribed.  Please follow-up with your primary care provider as well as with endocrinologist.  Please avoid taking marijuana or any other street drugs.  For any medical emergency please go to the nearest emergency room or call 911.    Discharge Instructions    Discharged to home by car with friend. Discharged via walking, hospital escort: Refused.  Special equipment needed: Not Applicable    Be sure to schedule a follow-up appointment with your primary care doctor or any specialists as instructed.     Discharge Plan:   Diet Plan: Discussed (regular)  Activity Level: Discussed (activity as tolerated)  Confirmed Follow up Appointment: No (Comments)  Confirmed Symptoms Management: Discussed  Medication Reconciliation Updated: Yes    I understand that a diet low in cholesterol, fat, and sodium is recommended for good health. Unless I have been given specific instructions below for another diet, I accept this instruction as my diet prescription.   Other diet: regular diet    Special Instructions: None    -Is this patient being discharged with medication to prevent blood clots?  No    Is patient discharged on Warfarin / Coumadin?   No       Hyponatremia  Hyponatremia is when the amount of salt (sodium) in your blood is too low. When salt levels are low, your body may take in extra water. This can cause swelling throughout the body. The swelling often affects the brain.  What are the causes?  This condition may be caused by:  Certain medical problems or conditions.  Vomiting a lot.  Having watery poop (diarrhea) often.  Certain medicines or illegal drugs.  Not having enough water in the body (dehydration).  Drinking too much water.  Eating a diet that is low in salt.  Large burns on your body.  Too much sweating.  What increases the risk?  You are more likely to get this condition if you:  Have long-term  (chronic) kidney disease.  Have heart failure.  Have a medical condition that causes you to have watery poop often.  Do very hard exercises.  Take medicines that affect the amount of salt is in your blood.  What are the signs or symptoms?  Symptoms of this condition include:  Headache.  Feeling like you may vomit (nausea).  Vomiting.  Being very tired (lethargic).  Muscle weakness and cramps.  Not wanting to eat as much as normal (loss of appetite).  Feeling weak or light-headed.  Severe symptoms of this condition include:  Confusion.  Feeling restless (agitation).  Having a fast heart rate.  Passing out (fainting).  Seizures.  Coma.  How is this treated?  Treatment for this condition depends on the cause. Treatment may include:  Getting fluids through an IV tube that is put into one of your veins.  Taking medicines to fix the salt levels in your blood. If medicines are causing the problem, your medicines will need to be changed.  Limiting how much water or fluid you take in.  Monitoring in the hospital to watch your symptoms.  Follow these instructions at home:    Take over-the-counter and prescription medicines only as told by your doctor. Many medicines can make this condition worse. Talk with your doctor about any medicines that you are taking.  Eat and drink exactly as you are told by your doctor.  Eat only the foods you are told to eat.  Limit how much fluid you take.  Do not drink alcohol.  Keep all follow-up visits as told by your doctor. This is important.  Contact a doctor if:  You feel more like you may vomit.  You feel more tired.  Your headache gets worse.  You feel more confused.  You feel weaker.  Your symptoms go away and then they come back.  You have trouble following the diet instructions.  Get help right away if:  You have a seizure.  You pass out.  You keep having watery poop.  You keep vomiting.  Summary  Hyponatremia is when the amount of salt in your blood is too low.  When salt levels are  low, you can have swelling throughout the body. The swelling mostly affects the brain.  Treatment depends on the cause. Treatment may include getting IV fluids, medicines, or not drinking as much fluid.  This information is not intended to replace advice given to you by your health care provider. Make sure you discuss any questions you have with your health care provider.  Document Released: 08/29/2012 Document Revised: 03/05/2020 Document Reviewed: 11/21/2019  Elsevier Patient Education © 2020 [employer]

## 2022-09-03 NOTE — PROGRESS NOTES
Bedside report received and patient care assumed. Pt is resting in bed, A&O4, with no complaints of pain, and is on RA.  All fall precautions are in place, belongings at bedside table.  Pt was updated on POC, no questions or concerns. Pt educated on use of call light for assistance.

## 2022-09-03 NOTE — PROGRESS NOTES
Pt ready for discharge. Avs printed and discharge teaching done. Pt has lower BP. Pt walked around the room and I took her blood pressure standing up. 96/55 asymptomatic. Pt states that she feels much better when compared to yesterday. IV removed. Notified DR. Champion about the BP and lack of symptoms. Dr. Champion says Pt can be discharged home. Pt is waiting on her ride.

## 2022-09-03 NOTE — CARE PLAN
The patient is   Problem: Knowledge Deficit - Standard  Goal: Patient and family/care givers will demonstrate understanding of plan of care, disease process/condition, diagnostic tests and medications  Outcome: Progressing

## 2022-09-03 NOTE — CARE PLAN
The patient is Stable - Low risk of patient condition declining or worsening    Shift Goals  Clinical Goals: stable Na  Patient Goals: go home      Problem: Knowledge Deficit - Standard  Goal: Patient and family/care givers will demonstrate understanding of plan of care, disease process/condition, diagnostic tests and medications  Outcome: Progressing     Problem: Communication  Goal: The ability to communicate needs accurately and effectively will improve  Outcome: Progressing     Problem: Hemodynamics  Goal: Patient's hemodynamics, fluid balance and neurologic status will be stable or improve  Outcome: Progressing     Problem: Respiratory  Goal: Patient will achieve/maintain optimum respiratory ventilation and gas exchange  Outcome: Progressing     Problem: Urinary Elimination  Goal: Establish and maintain regular urinary output  Outcome: Progressing

## 2022-09-03 NOTE — DISCHARGE SUMMARY
Discharge Summary    CHIEF COMPLAINT ON ADMISSION  Chief Complaint   Patient presents with    N/V     Starting around 2000 last night    Shaking       Reason for Admission  N/V     Admission Date  9/2/2022    CODE STATUS  Prior    HPI & HOSPITAL COURSE    Mary Carmen Head is a 32 y.o. 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  She went to visit her endocrinologist yesterday and sodium level noted to be 129.  Patient states that she had consumed marijuana THC Gummies approximately 3 of them and had multiple episodes of nausea vomiting.   Her sodium was 129 on September 1 and repeat sodium in hospital came back 117. She admitted to [facility] for close monitoring and she was started on IV fluid.  Her sodium corrected to normal to 136.  As her baseline sodium was 129 is not overcorrection from her previous sodium.  I started her on DDAVP her outpatient medication as she has been having increased frequency of urination.  She reported her last dose of DDAVP was on Wednesday.  During her hospitalization her sodium and symptoms improved. Today she found to have sodium of 137 and repeat sodium came back 134. I evaluated and examined at the bedside. She expressed that she is feeling significantly better as compared to yesterday.  Her blood pressure was slightly low but she was asymptomatic. I recommended close follow-up with primary care provider as well as with endocrinologist.  I also recommended her not to take any street drugs including marijuana as it may cause nausea and vomiting that can also lead to hyponatremia. I also recommended her not to drink water and excessive amount as it can also decrease sodium level and she expressed understanding.    Today on the day of discharge she denies any acute complaints and she feels ready to be discharged.  I discussed plan of care with her and answered all her  questions.    Therefore, she is discharged in good and stable condition to home with close outpatient follow-up.    The patient recovered much more quickly than anticipated on admission.    Discharge Date  9/3/2022    FOLLOW UP ITEMS POST DISCHARGE    Primary care provider  Endocrinologist     DISCHARGE DIAGNOSES  Principal Problem (Resolved):    Hyponatremia POA: Yes  Active Problems:    Diabetes insipidus (HCC) POA: Yes    Acquired hypothyroidism POA: Yes      FOLLOW UP  Future Appointments   Date Time Provider Department Center   9/16/2022 11:20 AM EMMA David [facility]   3/1/2023  4:40 PM ROYCE Hampton     No follow-up provider specified.    MEDICATIONS ON DISCHARGE     Medication List        CONTINUE taking these medications        Instructions   ascorbic acid 500 MG Tabs  Commonly known as: ascorbic acid   Take 500 mg by mouth every day.  Dose: 500 mg     coenzyme Q-10 30 MG capsule   Take 60 mg by mouth every day.  Dose: 60 mg     desmopressin 0.1 MG Tabs  Commonly known as: DDAVP   TAKE 1.5 TABLETS BY MOUTH EVERY BEDTIME.     IRON PO      Loryna 3-0.02 MG per tablet  Generic drug: drospirenone-ethinyl estradiol   TAKE 1 TABLET BY MOUTH EVERY DAY     Magnesium 500 MG Caps      * Ocuvite-Lutein Tabs   Take 1 tablet by mouth every day.  Dose: 1 tablet      * Hair/Skin/Nails Tabs   Take 1 Tab by mouth every day.  Dose: 1 Tablet     PROBIOTIC PO   Take 1 Cap by mouth every day.  Dose: 1 Capsule     Synthroid 75 MCG Tabs  Generic drug: levothyroxine   TAKE 1 TABLET BY MOUTH EVERY DAY IN THE MORNING ON AN EMPTY STOMACH     VITAMIN B-12 PO   Take 1 Tablet by mouth every day.  Dose: 1 Tablet     vitamin D2 (Ergocalciferol) 1.25 MG (74979 UT) Caps capsule  Commonly known as: Drisdol   Take 1 Capsule by mouth every 14 days.  Dose: 50,000 Units     Vitamin D3 1.25 MG (33939 UT) Caps   TAKE 1 CAPSULE BY MOUTH EVERY 14 DAYS.           * This list has 2 medication(s) that are  the same as other medications prescribed for you. Read the directions carefully, and ask your doctor or other care provider to review them with you.                STOP taking these medications      nitrofurantoin 100 MG Caps  Commonly known as: MACROBID              Allergies  Allergies   Allergen Reactions    Claritin Palpitations     Heart palpitations     Epinephrine Palpitations       DIET  No orders of the defined types were placed in this encounter.      ACTIVITY  As tolerated.  Weight bearing as tolerated    CONSULTATIONS  None    PROCEDURES  None    LABORATORY  Lab Results   Component Value Date    SODIUM 134 (L) 09/03/2022    POTASSIUM 3.9 09/03/2022    CHLORIDE 102 09/03/2022    CO2 21 09/03/2022    GLUCOSE 88 09/03/2022    BUN 8 09/03/2022    CREATININE 0.69 09/03/2022        Lab Results   Component Value Date    WBC 8.0 09/03/2022    HEMOGLOBIN 12.4 09/03/2022    HEMATOCRIT 35.2 (L) 09/03/2022    PLATELETCT 136 (L) 09/03/2022      No orders to display         Total time of the discharge process exceeds 34 minutes.

## 2022-09-07 ENCOUNTER — TELEPHONE (OUTPATIENT)
Dept: ENDOCRINOLOGY | Facility: MEDICAL CENTER | Age: 32
End: 2022-09-07
Payer: COMMERCIAL

## 2022-09-07 NOTE — TELEPHONE ENCOUNTER
Received VM from patient stating she was hospitalized and needs a FV, she did message Dr FORD and he told her to call the office to schedule.. where should we schedule her, since there are no openings?

## 2022-09-09 ENCOUNTER — TELEMEDICINE (OUTPATIENT)
Dept: ENDOCRINOLOGY | Facility: MEDICAL CENTER | Age: 32
End: 2022-09-09
Attending: INTERNAL MEDICINE
Payer: COMMERCIAL

## 2022-09-09 DIAGNOSIS — E03.9 ACQUIRED HYPOTHYROIDISM: ICD-10-CM

## 2022-09-09 DIAGNOSIS — D69.6 THROMBOCYTOPENIA (HCC): ICD-10-CM

## 2022-09-09 DIAGNOSIS — E55.9 VITAMIN D DEFICIENCY: ICD-10-CM

## 2022-09-09 DIAGNOSIS — E23.2 DIABETES INSIPIDUS (HCC): ICD-10-CM

## 2022-09-09 PROCEDURE — 99214 OFFICE O/P EST MOD 30 MIN: CPT | Mod: 95 | Performed by: INTERNAL MEDICINE
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
## 2022-09-15 ENCOUNTER — HOSPITAL ENCOUNTER (OUTPATIENT)
Dept: LAB | Facility: MEDICAL CENTER | Age: 32
End: 2022-09-15
Attending: INTERNAL MEDICINE
Payer: COMMERCIAL

## 2022-09-15 DIAGNOSIS — E23.2 DIABETES INSIPIDUS (HCC): ICD-10-CM

## 2022-09-15 LAB
ALBUMIN SERPL BCP-MCNC: 4.2 G/DL (ref 3.2–4.9)
ALBUMIN/GLOB SERPL: 1.2 G/DL
ALP SERPL-CCNC: 44 U/L (ref 30–99)
ALT SERPL-CCNC: 21 U/L (ref 2–50)
ANION GAP SERPL CALC-SCNC: 11 MMOL/L (ref 7–16)
AST SERPL-CCNC: 19 U/L (ref 12–45)
BILIRUB SERPL-MCNC: 0.4 MG/DL (ref 0.1–1.5)
BUN SERPL-MCNC: 7 MG/DL (ref 8–22)
CALCIUM SERPL-MCNC: 9.3 MG/DL (ref 8.5–10.5)
CHLORIDE SERPL-SCNC: 100 MMOL/L (ref 96–112)
CO2 SERPL-SCNC: 23 MMOL/L (ref 20–33)
CREAT SERPL-MCNC: 0.69 MG/DL (ref 0.5–1.4)
GFR SERPLBLD CREATININE-BSD FMLA CKD-EPI: 119 ML/MIN/1.73 M 2
GLOBULIN SER CALC-MCNC: 3.4 G/DL (ref 1.9–3.5)
GLUCOSE SERPL-MCNC: 82 MG/DL (ref 65–99)
POTASSIUM SERPL-SCNC: 3.6 MMOL/L (ref 3.6–5.5)
PROT SERPL-MCNC: 7.6 G/DL (ref 6–8.2)
SODIUM SERPL-SCNC: 134 MMOL/L (ref 135–145)

## 2022-09-15 PROCEDURE — 36415 COLL VENOUS BLD VENIPUNCTURE: CPT

## 2022-09-15 PROCEDURE — 80053 COMPREHEN METABOLIC PANEL: CPT

## 2022-09-16 ENCOUNTER — APPOINTMENT (OUTPATIENT)
Dept: MEDICAL GROUP | Facility: MEDICAL CENTER | Age: 32
End: 2022-09-16
Payer: COMMERCIAL

## 2022-10-06 ENCOUNTER — APPOINTMENT (OUTPATIENT)
Dept: MEDICAL GROUP | Facility: MEDICAL CENTER | Age: 32
End: 2022-10-06
Payer: COMMERCIAL

## 2022-10-19 ENCOUNTER — APPOINTMENT (OUTPATIENT)
Dept: MEDICAL GROUP | Facility: MEDICAL CENTER | Age: 32
End: 2022-10-19
Payer: COMMERCIAL

## 2022-10-20 ENCOUNTER — TELEMEDICINE (OUTPATIENT)
Dept: MEDICAL GROUP | Facility: MEDICAL CENTER | Age: 32
End: 2022-10-20
Payer: COMMERCIAL

## 2022-10-20 VITALS — TEMPERATURE: 98.3 F | BODY MASS INDEX: 20.33 KG/M2 | HEIGHT: 65 IN | WEIGHT: 122 LBS

## 2022-10-20 DIAGNOSIS — E87.1 HYPONATREMIA: ICD-10-CM

## 2022-10-20 DIAGNOSIS — R23.3 EASY BRUISING: ICD-10-CM

## 2022-10-20 DIAGNOSIS — F41.9 ANXIETY: ICD-10-CM

## 2022-10-20 DIAGNOSIS — E23.2 DIABETES INSIPIDUS (HCC): ICD-10-CM

## 2022-10-20 DIAGNOSIS — D69.6 THROMBOCYTOPENIA (HCC): ICD-10-CM

## 2022-10-20 DIAGNOSIS — R74.8 ELEVATED VITAMIN B12 LEVEL: ICD-10-CM

## 2022-10-20 PROCEDURE — 99214 OFFICE O/P EST MOD 30 MIN: CPT | Mod: 95 | Performed by: NURSE PRACTITIONER

## 2022-10-20 ASSESSMENT — FIBROSIS 4 INDEX: FIB4 SCORE: 0.95

## 2022-10-20 NOTE — ASSESSMENT & PLAN NOTE
Chronic.  Recently worsened over the last few months.  Has been having a lot of excessive fear when people knock on her door, she will hide in her bathroom with a baseball bat and try to talk her self down from this.  Has failed some SSRIs in the past, not currently taking anything for anxiety or depression.  Not currently seeing behavioral health.

## 2022-10-20 NOTE — ASSESSMENT & PLAN NOTE
Hospitalized September for hyponatremia related to nausea and vomiting and DI. She has followed up with her endocrinologist who has lowered her DDAVP to 100 mcg nightly from 150 mcg nightly.     Continues to have brain fog, increased anxiety, more impulsive behavior.
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
## 2022-10-27 ENCOUNTER — HOSPITAL ENCOUNTER (OUTPATIENT)
Dept: LAB | Facility: MEDICAL CENTER | Age: 32
End: 2022-10-27
Attending: NURSE PRACTITIONER
Payer: COMMERCIAL

## 2022-10-27 DIAGNOSIS — E87.1 HYPONATREMIA: ICD-10-CM

## 2022-10-27 DIAGNOSIS — R74.8 ELEVATED VITAMIN B12 LEVEL: ICD-10-CM

## 2022-10-27 DIAGNOSIS — D69.6 THROMBOCYTOPENIA (HCC): ICD-10-CM

## 2022-10-27 DIAGNOSIS — R23.3 EASY BRUISING: ICD-10-CM

## 2022-10-27 LAB
ANION GAP SERPL CALC-SCNC: 9 MMOL/L (ref 7–16)
BASOPHILS # BLD AUTO: 1.1 % (ref 0–1.8)
BASOPHILS # BLD: 0.09 K/UL (ref 0–0.12)
BUN SERPL-MCNC: 7 MG/DL (ref 8–22)
CALCIUM SERPL-MCNC: 9.7 MG/DL (ref 8.5–10.5)
CHLORIDE SERPL-SCNC: 100 MMOL/L (ref 96–112)
CO2 SERPL-SCNC: 25 MMOL/L (ref 20–33)
CREAT SERPL-MCNC: 0.69 MG/DL (ref 0.5–1.4)
EOSINOPHIL # BLD AUTO: 0.36 K/UL (ref 0–0.51)
EOSINOPHIL NFR BLD: 4.5 % (ref 0–6.9)
ERYTHROCYTE [DISTWIDTH] IN BLOOD BY AUTOMATED COUNT: 39.2 FL (ref 35.9–50)
FERRITIN SERPL-MCNC: 152 NG/ML (ref 10–291)
GFR SERPLBLD CREATININE-BSD FMLA CKD-EPI: 118 ML/MIN/1.73 M 2
GLUCOSE SERPL-MCNC: 85 MG/DL (ref 65–99)
HCT VFR BLD AUTO: 39.8 % (ref 37–47)
HGB BLD-MCNC: 13.8 G/DL (ref 12–16)
IMM GRANULOCYTES # BLD AUTO: 0.02 K/UL (ref 0–0.11)
IMM GRANULOCYTES NFR BLD AUTO: 0.3 % (ref 0–0.9)
IRON SATN MFR SERPL: 44 % (ref 15–55)
IRON SERPL-MCNC: 150 UG/DL (ref 40–170)
LYMPHOCYTES # BLD AUTO: 2.9 K/UL (ref 1–4.8)
LYMPHOCYTES NFR BLD: 36.4 % (ref 22–41)
MCH RBC QN AUTO: 31.1 PG (ref 27–33)
MCHC RBC AUTO-ENTMCNC: 34.7 G/DL (ref 33.6–35)
MCV RBC AUTO: 89.6 FL (ref 81.4–97.8)
MONOCYTES # BLD AUTO: 0.6 K/UL (ref 0–0.85)
MONOCYTES NFR BLD AUTO: 7.5 % (ref 0–13.4)
NEUTROPHILS # BLD AUTO: 3.99 K/UL (ref 2–7.15)
NEUTROPHILS NFR BLD: 50.2 % (ref 44–72)
NRBC # BLD AUTO: 0 K/UL
NRBC BLD-RTO: 0 /100 WBC
PLATELET # BLD AUTO: 259 K/UL (ref 164–446)
PMV BLD AUTO: 9.9 FL (ref 9–12.9)
POTASSIUM SERPL-SCNC: 4.2 MMOL/L (ref 3.6–5.5)
RBC # BLD AUTO: 4.44 M/UL (ref 4.2–5.4)
SODIUM SERPL-SCNC: 134 MMOL/L (ref 135–145)
TIBC SERPL-MCNC: 343 UG/DL (ref 250–450)
UIBC SERPL-MCNC: 193 UG/DL (ref 110–370)
VIT B12 SERPL-MCNC: 1039 PG/ML (ref 211–911)
WBC # BLD AUTO: 8 K/UL (ref 4.8–10.8)

## 2022-10-27 PROCEDURE — 80048 BASIC METABOLIC PNL TOTAL CA: CPT

## 2022-10-27 PROCEDURE — 36415 COLL VENOUS BLD VENIPUNCTURE: CPT

## 2022-10-27 PROCEDURE — 82728 ASSAY OF FERRITIN: CPT

## 2022-10-27 PROCEDURE — 85025 COMPLETE CBC W/AUTO DIFF WBC: CPT

## 2022-10-27 PROCEDURE — 82607 VITAMIN B-12: CPT

## 2022-10-27 PROCEDURE — 83550 IRON BINDING TEST: CPT

## 2022-10-27 PROCEDURE — 83540 ASSAY OF IRON: CPT

## 2022-11-14 ENCOUNTER — OFFICE VISIT (OUTPATIENT)
Dept: URGENT CARE | Facility: CLINIC | Age: 32
End: 2022-11-14
Payer: COMMERCIAL

## 2022-11-14 DIAGNOSIS — R05.1 ACUTE COUGH: ICD-10-CM

## 2022-11-14 PROCEDURE — 99213 OFFICE O/P EST LOW 20 MIN: CPT | Performed by: PHYSICIAN ASSISTANT

## 2022-11-14 RX ORDER — BENZONATATE 100 MG/1
100 CAPSULE ORAL 3 TIMES DAILY PRN
Qty: 21 CAPSULE | Refills: 0 | Status: SHIPPED | OUTPATIENT
Start: 2022-11-14 | End: 2023-08-01

## 2022-11-14 ASSESSMENT — FIBROSIS 4 INDEX: FIB4 SCORE: 0.5

## 2022-11-15 NOTE — PROGRESS NOTES
Subjective:   Mayr Carmen Head is a 32 y.o. female who presents for Cough (Sx began over a week ago , congestion )      HPI  The patient presents to the Urgent Care with complaints of a cough onset 9 days ago.  Symptoms started after being outside in the rain in [place].  Cough is sometimes productive and sometimes not.  No hemoptysis.  Associated nasal congestion.  Chest congestion has improved. Denies any fever, chills, chest pain, SOB, vomiting, diarrhea. Did not receive COVID vaccine. Mother had similar symptoms.         Medications:    ascorbic acid Tabs  coenzyme Q-10  desmopressin Tabs  Hair/Skin/Nails Tabs  IRON PO  Loryna Tabs  Magnesium Caps  Ocuvite-Lutein Tabs  PROBIOTIC PO  Synthroid Tabs  VITAMIN B-12 PO  vitamin D2 (Ergocalciferol) Caps  Vitamin D3 Caps    Allergies: Claritin and Epinephrine    Problem List: Mary Carmen Head does not have any pertinent problems on file.    Surgical History:  Past Surgical History:   Procedure Laterality Date    CORNELL BY LAPAROSCOPY N/A 11/5/2021    Procedure: CHOLECYSTECTOMY, LAPAROSCOPIC;  Surgeon: Rigoberto Overton M.D.;  Location: SURGERY [facility];  Service: General    DENTAL EXTRACTION(S)      wisdom teeth       Past Social Hx: Mary Carmen Head  reports that she quit smoking about 4 years ago. Her smoking use included cigarettes. She has never used smokeless tobacco. She reports that she does not currently use alcohol. She reports current drug use. Drugs: Oral and Marijuana.     Past Family Hx:  Mary Carmen Head family history includes Heart Disease in her maternal grandfather, maternal grandmother, and paternal grandmother; Hypertension in her mother; Prostate cancer in her paternal grandfather; Scoliosis in her paternal grandmother; Thyroid in her mother.     Problem list, medications, and allergies reviewed by myself today in Epic.     Objective:     BP (!) (P) 98/64 (BP Location: Left arm, Patient Position: Sitting, BP  "Cuff Size: Adult)   Pulse (P) 67   Temp (P) 36.3 °C (97.3 °F) (Temporal)   Resp (P) 16   Ht (P) 1.651 m (5' 5\")   Wt (P) 59 kg (130 lb)   SpO2 (P) 100%   BMI (P) 21.63 kg/m²     Physical Exam  Vitals reviewed.   Constitutional:       General: She is not in acute distress.     Appearance: Normal appearance. She is not ill-appearing or toxic-appearing.   HENT:      Mouth/Throat:      Mouth: Mucous membranes are moist.      Pharynx: Oropharynx is clear. No oropharyngeal exudate or posterior oropharyngeal erythema.   Eyes:      Conjunctiva/sclera: Conjunctivae normal.      Pupils: Pupils are equal, round, and reactive to light.   Cardiovascular:      Rate and Rhythm: Normal rate and regular rhythm.      Heart sounds: Normal heart sounds.   Pulmonary:      Effort: Pulmonary effort is normal. No respiratory distress.      Breath sounds: Normal breath sounds. No wheezing, rhonchi or rales.   Musculoskeletal:      Cervical back: No rigidity.   Lymphadenopathy:      Cervical: No cervical adenopathy.   Skin:     General: Skin is warm and dry.   Neurological:      General: No focal deficit present.      Mental Status: She is alert and oriented to person, place, and time.   Psychiatric:         Mood and Affect: Mood normal.         Behavior: Behavior normal.     Diagnosis and associated orders:     1. Acute cough  - benzonatate (TESSALON) 100 MG Cap; Take 1 Capsule by mouth 3 times a day as needed for Cough.  Dispense: 21 Capsule; Refill: 0     Comments/MDM:     The patient's presenting symptoms and exam findings are consistent with a upper respiratory infection most likely viral etiology. They have a normal pulse oximetry on room air, afebrile, and a normal pulmonary exam. Overall, the patient is very well appearing. I do not feel that this patient would benefit from antibiotics at this time.   Recommended symptomatic and supportive care at this time that includes plenty of fluids, rest, Tylenol/Ibuprofen for " pain/fever, warm salt water gargles for sore throat, OTC cough and decongestant medication, Flonase, nasal saline washes.      I personally reviewed prior external notes and test results pertinent to today's visit. Pathogenesis of diagnosis discussed including typical length and natural progression. Supportive care, natural history, differential diagnoses, and indications for immediate follow-up discussed. Patient expresses understanding and agrees to plan. Patient denies any other questions or concerns.     Follow-up with the primary care physician for recheck, reevaluation, and consideration of further management.    Please note that this dictation was created using voice recognition software. I have made a reasonable attempt to correct obvious errors, but I expect that there are errors of grammar and possibly content that I did not discover before finalizing the note.    This note was electronically signed by Juan Miguel Ambrosio PA-C

## 2022-12-30 DIAGNOSIS — E03.9 ACQUIRED HYPOTHYROIDISM: ICD-10-CM

## 2023-01-05 ENCOUNTER — HOSPITAL ENCOUNTER (OUTPATIENT)
Dept: LAB | Facility: MEDICAL CENTER | Age: 33
End: 2023-01-05
Attending: INTERNAL MEDICINE
Payer: COMMERCIAL

## 2023-01-05 DIAGNOSIS — E55.9 VITAMIN D DEFICIENCY: ICD-10-CM

## 2023-01-05 DIAGNOSIS — E23.2 DIABETES INSIPIDUS (HCC): ICD-10-CM

## 2023-01-05 DIAGNOSIS — Z79.899 HIGH RISK MEDICATION USE: ICD-10-CM

## 2023-01-05 DIAGNOSIS — E03.9 ACQUIRED HYPOTHYROIDISM: ICD-10-CM

## 2023-01-05 LAB
25(OH)D3 SERPL-MCNC: 69 NG/ML (ref 30–100)
T3FREE SERPL-MCNC: 2.72 PG/ML (ref 2–4.4)
T4 FREE SERPL-MCNC: 1.4 NG/DL (ref 0.93–1.7)
TSH SERPL DL<=0.005 MIU/L-ACNC: 3.64 UIU/ML (ref 0.38–5.33)

## 2023-01-05 PROCEDURE — 84439 ASSAY OF FREE THYROXINE: CPT

## 2023-01-05 PROCEDURE — 84481 FREE ASSAY (FT-3): CPT

## 2023-01-05 PROCEDURE — 36415 COLL VENOUS BLD VENIPUNCTURE: CPT

## 2023-01-05 PROCEDURE — 84443 ASSAY THYROID STIM HORMONE: CPT

## 2023-01-05 PROCEDURE — 82306 VITAMIN D 25 HYDROXY: CPT

## 2023-01-11 ENCOUNTER — TELEMEDICINE (OUTPATIENT)
Dept: MEDICAL GROUP | Facility: MEDICAL CENTER | Age: 33
End: 2023-01-11
Payer: COMMERCIAL

## 2023-01-11 VITALS — HEIGHT: 65 IN | WEIGHT: 122 LBS | BODY MASS INDEX: 20.33 KG/M2 | TEMPERATURE: 98 F

## 2023-01-11 DIAGNOSIS — R45.86 MOOD CHANGES: ICD-10-CM

## 2023-01-11 DIAGNOSIS — F33.1 MODERATE EPISODE OF RECURRENT MAJOR DEPRESSIVE DISORDER (HCC): ICD-10-CM

## 2023-01-11 DIAGNOSIS — F43.10 POSTTRAUMATIC STRESS DISORDER: ICD-10-CM

## 2023-01-11 DIAGNOSIS — F41.9 ANXIETY: ICD-10-CM

## 2023-01-11 DIAGNOSIS — E23.2 DIABETES INSIPIDUS (HCC): ICD-10-CM

## 2023-01-11 PROCEDURE — 99214 OFFICE O/P EST MOD 30 MIN: CPT | Mod: 25,95 | Performed by: NURSE PRACTITIONER

## 2023-01-11 PROCEDURE — 96127 BRIEF EMOTIONAL/BEHAV ASSMT: CPT | Mod: 95 | Performed by: NURSE PRACTITIONER

## 2023-01-11 RX ORDER — SERTRALINE HYDROCHLORIDE 25 MG/1
25 TABLET, FILM COATED ORAL EVERY EVENING
Qty: 30 TABLET | Refills: 5 | Status: SHIPPED | OUTPATIENT
Start: 2023-01-11 | End: 2023-02-08

## 2023-01-11 ASSESSMENT — ANXIETY QUESTIONNAIRES
6. BECOMING EASILY ANNOYED OR IRRITABLE: NEARLY EVERY DAY
3. WORRYING TOO MUCH ABOUT DIFFERENT THINGS: NEARLY EVERY DAY
IF YOU CHECKED OFF ANY PROBLEMS ON THIS QUESTIONNAIRE, HOW DIFFICULT HAVE THESE PROBLEMS MADE IT FOR YOU TO DO YOUR WORK, TAKE CARE OF THINGS AT HOME, OR GET ALONG WITH OTHER PEOPLE: VERY DIFFICULT
1. FEELING NERVOUS, ANXIOUS, OR ON EDGE: NEARLY EVERY DAY
2. NOT BEING ABLE TO STOP OR CONTROL WORRYING: NEARLY EVERY DAY
GAD7 TOTAL SCORE: 20
4. TROUBLE RELAXING: NEARLY EVERY DAY
7. FEELING AFRAID AS IF SOMETHING AWFUL MIGHT HAPPEN: NEARLY EVERY DAY
5. BEING SO RESTLESS THAT IT IS HARD TO SIT STILL: MORE THAN HALF THE DAYS

## 2023-01-11 ASSESSMENT — PATIENT HEALTH QUESTIONNAIRE - PHQ9
SUM OF ALL RESPONSES TO PHQ QUESTIONS 1-9: 22
CLINICAL INTERPRETATION OF PHQ2 SCORE: 5
5. POOR APPETITE OR OVEREATING: 3 - NEARLY EVERY DAY

## 2023-01-11 ASSESSMENT — FIBROSIS 4 INDEX: FIB4 SCORE: 0.5

## 2023-01-11 NOTE — ASSESSMENT & PLAN NOTE
Reports significant brain fog, irritability/constant anger, unable to enjoy normal things, return of intrusive/dark thoughts and depression. Worse after she eats.

## 2023-01-12 NOTE — ASSESSMENT & PLAN NOTE
Chronic.  Recently worsened over the last few months.  Has been having a lot of excessive fear when people knock on her door, she will hide in her bathroom with a baseball bat and try to talk her self down from this.  Has failed some SSRIs in the past, not currently taking anything for anxiety or depression.  Not currently seeing behavioral health.         1/11/2023   LOUISA 7   LOUISA-7 Total Score 20       Multiple values from one day are sorted in reverse-chronological order       Interpretation of LOUISA 7 Total Score   Score Severity:  15-21 Severe Anxiety
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
## 2023-01-12 NOTE — ASSESSMENT & PLAN NOTE
Chronic problem, ongoing for several years.  Significantly worsened over the past few months.  Failed fluoxetine and effexor in the past. PMDD symptoms are well controlled with COCP. No SI/HI. No longer seeing therapy.     Reports significant brain fog, irritability/constant anger, unable to enjoy normal things in life, return of intrusive/dark thoughts and depression.        3/2/2022 9/2/2022 1/11/2023   Depression Screen (PHQ-2/PHQ-9)   PHQ-2 Total Score 2 0    0    PHQ-2 Total Score   5   PHQ-9 Total Score 10     PHQ-9 Total Score   22       Multiple values from one day are sorted in reverse-chronological order       Interpretation of PHQ-9 Total Score   Score Severity   20-27 Severe Depression

## 2023-01-30 ENCOUNTER — TELEPHONE (OUTPATIENT)
Dept: MEDICAL GROUP | Facility: MEDICAL CENTER | Age: 33
End: 2023-01-30
Payer: COMMERCIAL

## 2023-01-30 DIAGNOSIS — E87.1 HYPONATREMIA: ICD-10-CM

## 2023-02-09 ENCOUNTER — TELEMEDICINE (OUTPATIENT)
Dept: MEDICAL GROUP | Facility: MEDICAL CENTER | Age: 33
End: 2023-02-09
Payer: COMMERCIAL

## 2023-02-09 VITALS — TEMPERATURE: 98 F | BODY MASS INDEX: 21.66 KG/M2 | WEIGHT: 130 LBS | HEIGHT: 65 IN

## 2023-02-09 DIAGNOSIS — F41.9 ANXIETY: ICD-10-CM

## 2023-02-09 PROCEDURE — 99214 OFFICE O/P EST MOD 30 MIN: CPT | Mod: 95 | Performed by: NURSE PRACTITIONER

## 2023-02-09 RX ORDER — VENLAFAXINE HYDROCHLORIDE 37.5 MG/1
37.5 CAPSULE, EXTENDED RELEASE ORAL DAILY
Qty: 90 CAPSULE | Refills: 3 | Status: SHIPPED | OUTPATIENT
Start: 2023-02-09 | End: 2023-03-10 | Stop reason: SDUPTHER

## 2023-02-09 ASSESSMENT — FIBROSIS 4 INDEX: FIB4 SCORE: 0.5

## 2023-02-09 NOTE — ASSESSMENT & PLAN NOTE
Started sertraline 25 mg daily about 1 month ago. Was having some side effects like throat tightness.     Since starting she has had worsening insomnia as well as significantly worsening agoraphobia.     Has also been having breakthrough menstrual bleeding since starting this.     Has not done repeat CMP yet due to agoraphobia
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
## 2023-03-01 ENCOUNTER — TELEMEDICINE (OUTPATIENT)
Dept: ENDOCRINOLOGY | Facility: MEDICAL CENTER | Age: 33
End: 2023-03-01
Attending: INTERNAL MEDICINE
Payer: COMMERCIAL

## 2023-03-01 DIAGNOSIS — E55.9 VITAMIN D DEFICIENCY: ICD-10-CM

## 2023-03-01 DIAGNOSIS — Z79.899 HIGH RISK MEDICATION USE: ICD-10-CM

## 2023-03-01 DIAGNOSIS — E03.9 ACQUIRED HYPOTHYROIDISM: ICD-10-CM

## 2023-03-01 DIAGNOSIS — E23.2 DIABETES INSIPIDUS (HCC): ICD-10-CM

## 2023-03-01 PROCEDURE — 99214 OFFICE O/P EST MOD 30 MIN: CPT | Mod: 95 | Performed by: INTERNAL MEDICINE

## 2023-03-09 ENCOUNTER — APPOINTMENT (OUTPATIENT)
Dept: MEDICAL GROUP | Facility: MEDICAL CENTER | Age: 33
End: 2023-03-09
Payer: COMMERCIAL

## 2023-03-13 ENCOUNTER — HOSPITAL ENCOUNTER (OUTPATIENT)
Dept: LAB | Facility: MEDICAL CENTER | Age: 33
End: 2023-03-13
Attending: NURSE PRACTITIONER
Payer: COMMERCIAL

## 2023-03-13 DIAGNOSIS — E03.9 ACQUIRED HYPOTHYROIDISM: ICD-10-CM

## 2023-03-13 DIAGNOSIS — E87.1 HYPONATREMIA: ICD-10-CM

## 2023-03-13 LAB
ANION GAP SERPL CALC-SCNC: 11 MMOL/L (ref 7–16)
BUN SERPL-MCNC: 8 MG/DL (ref 8–22)
CALCIUM SERPL-MCNC: 9.2 MG/DL (ref 8.5–10.5)
CHLORIDE SERPL-SCNC: 100 MMOL/L (ref 96–112)
CO2 SERPL-SCNC: 23 MMOL/L (ref 20–33)
CREAT SERPL-MCNC: 0.66 MG/DL (ref 0.5–1.4)
GFR SERPLBLD CREATININE-BSD FMLA CKD-EPI: 119 ML/MIN/1.73 M 2
GLUCOSE SERPL-MCNC: 85 MG/DL (ref 65–99)
POTASSIUM SERPL-SCNC: 4.4 MMOL/L (ref 3.6–5.5)
SODIUM SERPL-SCNC: 134 MMOL/L (ref 135–145)

## 2023-03-13 PROCEDURE — 36415 COLL VENOUS BLD VENIPUNCTURE: CPT

## 2023-03-13 PROCEDURE — 80048 BASIC METABOLIC PNL TOTAL CA: CPT

## 2023-03-13 RX ORDER — LEVOTHYROXINE SODIUM 75 MCG
TABLET ORAL
Qty: 90 TABLET | Refills: 2 | Status: SHIPPED | OUTPATIENT
Start: 2023-03-13 | End: 2023-08-01 | Stop reason: SDUPTHER

## 2023-04-20 ENCOUNTER — TELEMEDICINE (OUTPATIENT)
Dept: MEDICAL GROUP | Facility: MEDICAL CENTER | Age: 33
End: 2023-04-20
Payer: COMMERCIAL

## 2023-04-20 DIAGNOSIS — F41.9 ANXIETY: ICD-10-CM

## 2023-04-20 DIAGNOSIS — F33.1 MODERATE EPISODE OF RECURRENT MAJOR DEPRESSIVE DISORDER (HCC): ICD-10-CM

## 2023-04-20 DIAGNOSIS — E87.1 HYPONATREMIA: ICD-10-CM

## 2023-04-20 PROCEDURE — 99214 OFFICE O/P EST MOD 30 MIN: CPT | Mod: 95 | Performed by: NURSE PRACTITIONER

## 2023-04-20 RX ORDER — ESCITALOPRAM OXALATE 10 MG/1
10 TABLET ORAL DAILY
Qty: 30 TABLET | Refills: 3 | Status: SHIPPED | OUTPATIENT
Start: 2023-04-20 | End: 2023-05-15 | Stop reason: SDUPTHER

## 2023-04-20 NOTE — ASSESSMENT & PLAN NOTE
Chronic. Failed sertraline due to worsening insomnia as well as significantly worsening agoraphobia.     Started on effexor 37.5 mg daily, increased dose to 75 mg daily. Having significant fatigue and sexual side effects with medication, more difficult to orgasm. Having low motiovation to get things done. Having more mood swings     Has not done repeat CMP yet due to agoraphobia
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
## 2023-04-28 DIAGNOSIS — F32.81 PMDD (PREMENSTRUAL DYSPHORIC DISORDER): ICD-10-CM

## 2023-04-29 DIAGNOSIS — F32.81 PMDD (PREMENSTRUAL DYSPHORIC DISORDER): ICD-10-CM

## 2023-05-02 RX ORDER — DROSPIRENONE AND ETHINYL ESTRADIOL 0.02-3(28)
1 KIT ORAL
Qty: 84 TABLET | Refills: 3 | OUTPATIENT
Start: 2023-05-02

## 2023-05-02 RX ORDER — DROSPIRENONE AND ETHINYL ESTRADIOL TABLETS 0.02-3(28)
KIT ORAL
Qty: 84 TABLET | Refills: 3 | Status: SHIPPED | OUTPATIENT
Start: 2023-05-02

## 2023-05-15 ENCOUNTER — HOSPITAL ENCOUNTER (OUTPATIENT)
Facility: MEDICAL CENTER | Age: 33
End: 2023-05-15
Attending: NURSE PRACTITIONER
Payer: COMMERCIAL

## 2023-05-15 ENCOUNTER — OFFICE VISIT (OUTPATIENT)
Dept: MEDICAL GROUP | Facility: MEDICAL CENTER | Age: 33
End: 2023-05-15
Payer: COMMERCIAL

## 2023-05-15 VITALS
DIASTOLIC BLOOD PRESSURE: 60 MMHG | SYSTOLIC BLOOD PRESSURE: 100 MMHG | RESPIRATION RATE: 16 BRPM | OXYGEN SATURATION: 96 % | HEIGHT: 65 IN | TEMPERATURE: 97.1 F | BODY MASS INDEX: 20.49 KG/M2 | WEIGHT: 123 LBS | HEART RATE: 58 BPM

## 2023-05-15 DIAGNOSIS — Z87.448 HISTORY OF HEMATURIA: ICD-10-CM

## 2023-05-15 DIAGNOSIS — R10.2 VAGINAL PAIN: ICD-10-CM

## 2023-05-15 DIAGNOSIS — F41.9 ANXIETY: ICD-10-CM

## 2023-05-15 DIAGNOSIS — F33.1 MODERATE EPISODE OF RECURRENT MAJOR DEPRESSIVE DISORDER (HCC): ICD-10-CM

## 2023-05-15 LAB
APPEARANCE UR: CLEAR
BILIRUB UR STRIP-MCNC: NEGATIVE MG/DL
COLOR UR AUTO: YELLOW
GLUCOSE UR STRIP.AUTO-MCNC: NEGATIVE MG/DL
KETONES UR STRIP.AUTO-MCNC: NEGATIVE MG/DL
LEUKOCYTE ESTERASE UR QL STRIP.AUTO: NEGATIVE
NITRITE UR QL STRIP.AUTO: NEGATIVE
PH UR STRIP.AUTO: 7 [PH] (ref 5–8)
PROT UR QL STRIP: NEGATIVE MG/DL
RBC UR QL AUTO: NORMAL
SP GR UR STRIP.AUTO: 1.01
UROBILINOGEN UR STRIP-MCNC: NORMAL MG/DL

## 2023-05-15 PROCEDURE — 81003 URINALYSIS AUTO W/O SCOPE: CPT | Mod: 59 | Performed by: NURSE PRACTITIONER

## 2023-05-15 PROCEDURE — 87086 URINE CULTURE/COLONY COUNT: CPT

## 2023-05-15 PROCEDURE — 87591 N.GONORRHOEAE DNA AMP PROB: CPT

## 2023-05-15 PROCEDURE — 3078F DIAST BP <80 MM HG: CPT | Performed by: NURSE PRACTITIONER

## 2023-05-15 PROCEDURE — 99214 OFFICE O/P EST MOD 30 MIN: CPT | Mod: 25 | Performed by: NURSE PRACTITIONER

## 2023-05-15 PROCEDURE — 87480 CANDIDA DNA DIR PROBE: CPT

## 2023-05-15 PROCEDURE — 81002 URINALYSIS NONAUTO W/O SCOPE: CPT | Performed by: NURSE PRACTITIONER

## 2023-05-15 PROCEDURE — 87510 GARDNER VAG DNA DIR PROBE: CPT

## 2023-05-15 PROCEDURE — 87660 TRICHOMONAS VAGIN DIR PROBE: CPT

## 2023-05-15 PROCEDURE — 87491 CHLMYD TRACH DNA AMP PROBE: CPT

## 2023-05-15 PROCEDURE — 3074F SYST BP LT 130 MM HG: CPT | Performed by: NURSE PRACTITIONER

## 2023-05-15 RX ORDER — ESCITALOPRAM OXALATE 20 MG/1
20 TABLET ORAL DAILY
Qty: 90 TABLET | Refills: 3 | Status: SHIPPED | OUTPATIENT
Start: 2023-05-15 | End: 2023-06-15

## 2023-05-15 RX ORDER — OMEPRAZOLE 40 MG/1
CAPSULE, DELAYED RELEASE ORAL
COMMUNITY
End: 2023-06-15

## 2023-05-15 RX ORDER — SUCRALFATE 1 G/1
TABLET ORAL
COMMUNITY
End: 2023-06-15

## 2023-05-15 RX ORDER — ESCITALOPRAM OXALATE 10 MG/1
10 TABLET ORAL DAILY
Qty: 30 TABLET | Refills: 3 | Status: SHIPPED | OUTPATIENT
Start: 2023-05-15 | End: 2023-05-15

## 2023-05-15 ASSESSMENT — FIBROSIS 4 INDEX: FIB4 SCORE: 0.51

## 2023-05-15 NOTE — PROGRESS NOTES
Subjective:   Mary Carmen Head is a 33 y.o. female here today for depression/anxiety     Moderate episode of recurrent major depressive disorder (HCC)  Chronic. Failed sertraline due to worsening insomnia as well as significantly worsening agoraphobia.     Failed effexor due to significant fatigue and sexual side effects, more difficult to orgasm, visual changes    Started on lexapro 10 mg daily. Vision has improved. Has noticed increase in appetite. Has had some improvement in mood/motivation, unsure if this is medication or improvement in weather. Hasn't noticed significant improvement in anxiety/agoraphobia.     Vaginal pain  Ongoing for several months, located right inside vaginal opening, can be on right or left sides, will change. Worsens with arousal. No dysuria, itching, excessive discharge. Has not been sexually active for 10 years. Not inserting anything into vaginal canal.        Current medicines (including changes today)  Current Outpatient Medications   Medication Sig Dispense Refill    escitalopram (LEXAPRO) 20 MG tablet Take 1 Tablet by mouth every day. 90 Tablet 3    Multiple Vitamins-Minerals (MULTIVITAMIN ADULT EXTRA C PO) Multivitamin      omeprazole (PRILOSEC) 40 MG delayed-release capsule Oral for 30      sucralfate (CARAFATE) 1 GM Tab Oral for 30      LORYNA 3-0.02 MG per tablet TAKE 1 TABLET BY MOUTH EVERY DAY 84 Tablet 3    SYNTHROID 75 MCG Tab TAKE 1 TABLET BY MOUTH EVERY DAY IN THE MORNING ON AN EMPTY STOMACH 90 Tablet 2    buPROPion (WELLBUTRIN XL) 150 MG XL tablet Take 1 Tablet by mouth every morning. 90 Tablet 3    benzonatate (TESSALON) 100 MG Cap Take 1 Capsule by mouth 3 times a day as needed for Cough. 21 Capsule 0    vitamin D2, Ergocalciferol, (DRISDOL) 1.25 MG (55756 UT) Cap capsule Take 1 Capsule by mouth every 14 days. 12 Capsule 1    desmopressin (DDAVP) 0.1 MG Tab TAKE 1.5 TABLETS BY MOUTH EVERY BEDTIME. 135 Tablet 2    Cholecalciferol (VITAMIN D3) 1.25 MG (49966  "UT) Cap TAKE 1 CAPSULE BY MOUTH EVERY 14 DAYS. 6 Capsule 3    Ferrous Sulfate (IRON PO)       Magnesium 500 MG Cap       Cyanocobalamin (VITAMIN B-12 PO) Take 1 Tablet by mouth every day.      ascorbic acid (ASCORBIC ACID) 500 MG Tab Take 1 Tablet by mouth every day.      Multiple Vitamins-Minerals (OCUVITE-LUTEIN) Tab Take 1 tablet by mouth every day.      Multiple Vitamins-Minerals (HAIR/SKIN/NAILS) Tab Take 1 Tab by mouth every day.      coenzyme Q-10 30 MG capsule Take 2 Capsules by mouth every day.      Probiotic Product (PROBIOTIC PO) Take 1 Cap by mouth every day.       No current facility-administered medications for this visit.     She  has a past medical history of Breath shortness (11/03/2021), Diabetes insipidus (HCC), Heart burn, History of IBS, Hypothyroidism, Indigestion, PMDD (premenstrual dysphoric disorder), and Vitamin D deficiency (12/12/2018).    ROS   No chest pain, no shortness of breath, no abdominal pain  Positive ROS as per HPI.  All other systems reviewed and are negative.     Objective:     /60 (Patient Position: Sitting)   Pulse (!) 58   Temp 36.2 °C (97.1 °F) (Temporal)   Resp 16   Ht 1.651 m (5' 5\")   Wt 55.8 kg (123 lb)   SpO2 96%  Body mass index is 20.47 kg/m².     Physical Exam:  Constitutional: Alert, no distress.  Skin: Warm, dry, good turgor, no rashes in visible areas.  Eye: Equal, round and reactive, conjunctiva clear, lids normal.  ENMT: Lips without lesions, good dentition  Respiratory: Unlabored respiratory effort  Psych: Alert and oriented x3, normal affect and mood.  Pelvic Exam -  Normal external genitalia with no lesions. Vaginal Mucosa:  mild erythema bilateral vaginal walls, normal discharge , speculum used with warm water as lubrication, Cervix well visualized with no discharge/friability/bleeding, Cervix with no visible lesions. vaginal swab is obtained and specimen(s) sent to lab        Assessment and Plan:   The following treatment plan was " discussed    1. Anxiety  Unstable  Increase lexapro to 20 mg daily  - escitalopram (LEXAPRO) 20 MG tablet; Take 1 Tablet by mouth every day.  Dispense: 90 Tablet; Refill: 3    2. Moderate episode of recurrent major depressive disorder (HCC)  Unstable  Increase lexapro to 20 mg daily  - escitalopram (LEXAPRO) 20 MG tablet; Take 1 Tablet by mouth every day.  Dispense: 90 Tablet; Refill: 3    3. Vaginal pain  Unstable  UA positive for small blood  Check affirm, GC  - POCT Urinalysis  - VAGINAL PATHOGENS DNA PANEL; Future  - Chlamydia/GC, PCR (Genital/Anal swab); Future    4. History of hematuria  - POCT Urinalysis      Followup: Return in about 4 weeks (around 6/12/2023).    The MA is performing the above orders under the direction of Dr. Mack    Please note that this dictation was created using voice recognition software. I have made every reasonable attempt to correct obvious errors, but I expect that there are errors of grammar and possibly content that I did not discover before finalizing the note.

## 2023-05-15 NOTE — ASSESSMENT & PLAN NOTE
Ongoing for several months, located right inside vaginal opening, can be on right or left sides, will change. Worsens with arousal. No dysuria, itching, excessive discharge. Has not been sexually active for 10 years. Not inserting anything into vaginal canal.

## 2023-05-15 NOTE — ASSESSMENT & PLAN NOTE
Chronic. Failed sertraline due to worsening insomnia as well as significantly worsening agoraphobia.     Failed effexor due to significant fatigue and sexual side effects, more difficult to orgasm, visual changes    Started on lexapro 10 mg daily. Vision has improved. Has noticed increase in appetite. Has had some improvement in mood/motivation, unsure if this is medication or improvement in weather. Hasn't noticed significant improvement in anxiety/agoraphobia.

## 2023-05-16 LAB
C TRACH DNA GENITAL QL NAA+PROBE: NEGATIVE
CANDIDA DNA VAG QL PROBE+SIG AMP: NEGATIVE
G VAGINALIS DNA VAG QL PROBE+SIG AMP: NEGATIVE
N GONORRHOEA DNA GENITAL QL NAA+PROBE: NEGATIVE
SPECIMEN SOURCE: NORMAL
T VAGINALIS DNA VAG QL PROBE+SIG AMP: NEGATIVE

## 2023-05-18 LAB
BACTERIA UR CULT: NORMAL
SIGNIFICANT IND 70042: NORMAL
SITE SITE: NORMAL
SOURCE SOURCE: NORMAL

## 2023-05-18 RX ORDER — ESCITALOPRAM OXALATE 10 MG/1
10 TABLET ORAL DAILY
Qty: 30 TABLET | Refills: 3 | OUTPATIENT
Start: 2023-05-18

## 2023-05-30 ENCOUNTER — HOSPITAL ENCOUNTER (OUTPATIENT)
Dept: LAB | Facility: MEDICAL CENTER | Age: 33
End: 2023-05-30
Attending: NURSE PRACTITIONER
Payer: COMMERCIAL

## 2023-05-30 ENCOUNTER — HOSPITAL ENCOUNTER (OUTPATIENT)
Dept: LAB | Facility: MEDICAL CENTER | Age: 33
End: 2023-05-30
Attending: INTERNAL MEDICINE
Payer: COMMERCIAL

## 2023-05-30 DIAGNOSIS — E23.2 DIABETES INSIPIDUS (HCC): ICD-10-CM

## 2023-05-30 DIAGNOSIS — E03.9 ACQUIRED HYPOTHYROIDISM: ICD-10-CM

## 2023-05-30 DIAGNOSIS — E55.9 VITAMIN D DEFICIENCY: ICD-10-CM

## 2023-05-30 DIAGNOSIS — E87.1 HYPONATREMIA: ICD-10-CM

## 2023-05-30 LAB
25(OH)D3 SERPL-MCNC: 62 NG/ML (ref 30–100)
ALBUMIN SERPL BCP-MCNC: 4 G/DL (ref 3.2–4.9)
ALBUMIN/GLOB SERPL: 1.3 G/DL
ALP SERPL-CCNC: 46 U/L (ref 30–99)
ALT SERPL-CCNC: 20 U/L (ref 2–50)
ANION GAP SERPL CALC-SCNC: 10 MMOL/L (ref 7–16)
ANION GAP SERPL CALC-SCNC: 10 MMOL/L (ref 7–16)
AST SERPL-CCNC: 15 U/L (ref 12–45)
BILIRUB SERPL-MCNC: 0.2 MG/DL (ref 0.1–1.5)
BUN SERPL-MCNC: 6 MG/DL (ref 8–22)
BUN SERPL-MCNC: 6 MG/DL (ref 8–22)
CALCIUM ALBUM COR SERPL-MCNC: 9.1 MG/DL (ref 8.5–10.5)
CALCIUM SERPL-MCNC: 9 MG/DL (ref 8.5–10.5)
CALCIUM SERPL-MCNC: 9.1 MG/DL (ref 8.5–10.5)
CHLORIDE SERPL-SCNC: 99 MMOL/L (ref 96–112)
CHLORIDE SERPL-SCNC: 99 MMOL/L (ref 96–112)
CO2 SERPL-SCNC: 24 MMOL/L (ref 20–33)
CO2 SERPL-SCNC: 24 MMOL/L (ref 20–33)
CREAT SERPL-MCNC: 0.67 MG/DL (ref 0.5–1.4)
CREAT SERPL-MCNC: 0.67 MG/DL (ref 0.5–1.4)
GFR SERPLBLD CREATININE-BSD FMLA CKD-EPI: 119 ML/MIN/1.73 M 2
GFR SERPLBLD CREATININE-BSD FMLA CKD-EPI: 119 ML/MIN/1.73 M 2
GLOBULIN SER CALC-MCNC: 3.2 G/DL (ref 1.9–3.5)
GLUCOSE SERPL-MCNC: 85 MG/DL (ref 65–99)
GLUCOSE SERPL-MCNC: 86 MG/DL (ref 65–99)
POTASSIUM SERPL-SCNC: 4.2 MMOL/L (ref 3.6–5.5)
POTASSIUM SERPL-SCNC: 4.3 MMOL/L (ref 3.6–5.5)
PROT SERPL-MCNC: 7.2 G/DL (ref 6–8.2)
SODIUM SERPL-SCNC: 133 MMOL/L (ref 135–145)
SODIUM SERPL-SCNC: 133 MMOL/L (ref 135–145)
T4 FREE SERPL-MCNC: 1.61 NG/DL (ref 0.93–1.7)
TSH SERPL DL<=0.005 MIU/L-ACNC: 0.19 UIU/ML (ref 0.38–5.33)

## 2023-05-30 PROCEDURE — 82306 VITAMIN D 25 HYDROXY: CPT

## 2023-05-30 PROCEDURE — 84439 ASSAY OF FREE THYROXINE: CPT

## 2023-05-30 PROCEDURE — 80053 COMPREHEN METABOLIC PANEL: CPT

## 2023-05-30 PROCEDURE — 36415 COLL VENOUS BLD VENIPUNCTURE: CPT

## 2023-05-30 PROCEDURE — 80048 BASIC METABOLIC PNL TOTAL CA: CPT

## 2023-05-30 PROCEDURE — 84443 ASSAY THYROID STIM HORMONE: CPT

## 2023-06-15 ENCOUNTER — TELEMEDICINE (OUTPATIENT)
Dept: MEDICAL GROUP | Facility: MEDICAL CENTER | Age: 33
End: 2023-06-15
Payer: COMMERCIAL

## 2023-06-15 DIAGNOSIS — F33.1 MODERATE EPISODE OF RECURRENT MAJOR DEPRESSIVE DISORDER (HCC): ICD-10-CM

## 2023-06-15 DIAGNOSIS — F41.9 ANXIETY: ICD-10-CM

## 2023-06-15 DIAGNOSIS — K29.00 ACUTE GASTRITIS WITHOUT HEMORRHAGE, UNSPECIFIED GASTRITIS TYPE: ICD-10-CM

## 2023-06-15 PROCEDURE — 99214 OFFICE O/P EST MOD 30 MIN: CPT | Mod: 95 | Performed by: NURSE PRACTITIONER

## 2023-06-15 RX ORDER — OMEPRAZOLE 20 MG/1
20 CAPSULE, DELAYED RELEASE ORAL DAILY
Qty: 30 CAPSULE | Refills: 0 | Status: SHIPPED | OUTPATIENT
Start: 2023-06-15 | End: 2023-07-25

## 2023-06-15 RX ORDER — ESCITALOPRAM OXALATE 10 MG/1
10 TABLET ORAL DAILY
Qty: 90 TABLET | Refills: 3 | Status: SHIPPED | OUTPATIENT
Start: 2023-06-15

## 2023-06-15 RX ORDER — SUCRALFATE ORAL 1 G/10ML
1 SUSPENSION ORAL 4 TIMES DAILY
Qty: 1200 ML | Refills: 0 | Status: SHIPPED | OUTPATIENT
Start: 2023-06-15 | End: 2023-07-15

## 2023-06-15 NOTE — ASSESSMENT & PLAN NOTE
Patient reports persistent stomach pain, decreased appetite, increased acid reflux. Has been taking omeprazole as needed which does help.     No alcohol, NSAIDs.
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
## 2023-06-18 DIAGNOSIS — E23.2 DIABETES INSIPIDUS (HCC): ICD-10-CM

## 2023-06-19 RX ORDER — DESMOPRESSIN ACETATE 0.1 MG/1
TABLET ORAL
Qty: 135 TABLET | Refills: 2 | Status: SHIPPED | OUTPATIENT
Start: 2023-06-19 | End: 2023-08-01 | Stop reason: SDUPTHER

## 2023-07-15 DIAGNOSIS — K29.00 ACUTE GASTRITIS WITHOUT HEMORRHAGE, UNSPECIFIED GASTRITIS TYPE: ICD-10-CM

## 2023-07-15 NOTE — TELEPHONE ENCOUNTER
Received request via: Pharmacy    Was the patient seen in the last year in this department? Yes    Does the patient have an active prescription (recently filled or refills available) for medication(s) requested?  yes    Does the patient have [facility] and need 100 day supply (blood pressure, diabetes and cholesterol meds only)? Patient does not have SCP   Requested Prescriptions     Pending Prescriptions Disp Refills    sucralfate (CARAFATE) 1 GM/10ML Suspension [Pharmacy Med Name: SUCRALFATE 1 GM/10 ML SUSP] 840 mL 1     Sig: TAKE 10 ML BY MOUTH 4 TIMES A DAY FOR 30 DAYS.

## 2023-07-17 RX ORDER — SUCRALFATE ORAL 1 G/10ML
1 SUSPENSION ORAL 4 TIMES DAILY
Qty: 840 ML | Refills: 1 | OUTPATIENT
Start: 2023-07-17 | End: 2023-08-16

## 2023-07-20 DIAGNOSIS — K29.00 ACUTE GASTRITIS WITHOUT HEMORRHAGE, UNSPECIFIED GASTRITIS TYPE: ICD-10-CM

## 2023-07-20 NOTE — TELEPHONE ENCOUNTER
Received request via: Pharmacy    Was the patient seen in the last year in this department? Yes    Does the patient have an active prescription (recently filled or refills available) for medication(s) requested?  yes    Does the patient have [facility] Plus and need 100 day supply (blood pressure, diabetes and cholesterol meds only)? Patient does not have SCP   Requested Prescriptions     Pending Prescriptions Disp Refills    omeprazole (PRILOSEC) 20 MG delayed-release capsule [Pharmacy Med Name: OMEPRAZOLE DR 20 MG CAPSULE] 30 Capsule 0     Sig: TAKE 1 CAPSULE BY MOUTH EVERY DAY

## 2023-07-25 ENCOUNTER — TELEPHONE (OUTPATIENT)
Dept: ENDOCRINOLOGY | Facility: MEDICAL CENTER | Age: 33
End: 2023-07-25
Payer: COMMERCIAL

## 2023-07-25 RX ORDER — OMEPRAZOLE 20 MG/1
20 CAPSULE, DELAYED RELEASE ORAL DAILY
Qty: 30 CAPSULE | Refills: 0 | Status: SHIPPED | OUTPATIENT
Start: 2023-07-25 | End: 2023-08-09

## 2023-07-31 ENCOUNTER — HOSPITAL ENCOUNTER (OUTPATIENT)
Dept: LAB | Facility: MEDICAL CENTER | Age: 33
End: 2023-07-31
Attending: INTERNAL MEDICINE
Payer: COMMERCIAL

## 2023-07-31 ENCOUNTER — TELEMEDICINE (OUTPATIENT)
Dept: MEDICAL GROUP | Facility: MEDICAL CENTER | Age: 33
End: 2023-07-31
Payer: COMMERCIAL

## 2023-07-31 DIAGNOSIS — Z79.899 HIGH RISK MEDICATION USE: ICD-10-CM

## 2023-07-31 DIAGNOSIS — E03.9 ACQUIRED HYPOTHYROIDISM: ICD-10-CM

## 2023-07-31 DIAGNOSIS — E23.2 DIABETES INSIPIDUS (HCC): ICD-10-CM

## 2023-07-31 DIAGNOSIS — E55.9 VITAMIN D DEFICIENCY: ICD-10-CM

## 2023-07-31 DIAGNOSIS — F33.1 MODERATE EPISODE OF RECURRENT MAJOR DEPRESSIVE DISORDER (HCC): ICD-10-CM

## 2023-07-31 LAB
25(OH)D3 SERPL-MCNC: 71 NG/ML (ref 30–100)
ALBUMIN SERPL BCP-MCNC: 4.3 G/DL (ref 3.2–4.9)
ALBUMIN/GLOB SERPL: 1.1 G/DL
ALP SERPL-CCNC: 47 U/L (ref 30–99)
ALT SERPL-CCNC: 23 U/L (ref 2–50)
ANION GAP SERPL CALC-SCNC: 12 MMOL/L (ref 7–16)
AST SERPL-CCNC: 22 U/L (ref 12–45)
BILIRUB SERPL-MCNC: 0.4 MG/DL (ref 0.1–1.5)
BUN SERPL-MCNC: 9 MG/DL (ref 8–22)
CALCIUM ALBUM COR SERPL-MCNC: 9.6 MG/DL (ref 8.5–10.5)
CALCIUM SERPL-MCNC: 9.8 MG/DL (ref 8.5–10.5)
CHLORIDE SERPL-SCNC: 103 MMOL/L (ref 96–112)
CO2 SERPL-SCNC: 24 MMOL/L (ref 20–33)
CREAT SERPL-MCNC: 0.71 MG/DL (ref 0.5–1.4)
GFR SERPLBLD CREATININE-BSD FMLA CKD-EPI: 116 ML/MIN/1.73 M 2
GLOBULIN SER CALC-MCNC: 3.9 G/DL (ref 1.9–3.5)
GLUCOSE SERPL-MCNC: 82 MG/DL (ref 65–99)
POTASSIUM SERPL-SCNC: 4.3 MMOL/L (ref 3.6–5.5)
PROT SERPL-MCNC: 8.2 G/DL (ref 6–8.2)
SODIUM SERPL-SCNC: 139 MMOL/L (ref 135–145)
T4 FREE SERPL-MCNC: 1.38 NG/DL (ref 0.93–1.7)
TSH SERPL DL<=0.005 MIU/L-ACNC: 0.68 UIU/ML (ref 0.38–5.33)

## 2023-07-31 PROCEDURE — 80053 COMPREHEN METABOLIC PANEL: CPT

## 2023-07-31 PROCEDURE — 82306 VITAMIN D 25 HYDROXY: CPT

## 2023-07-31 PROCEDURE — 36415 COLL VENOUS BLD VENIPUNCTURE: CPT

## 2023-07-31 PROCEDURE — 84443 ASSAY THYROID STIM HORMONE: CPT

## 2023-07-31 PROCEDURE — 99214 OFFICE O/P EST MOD 30 MIN: CPT | Mod: 95 | Performed by: NURSE PRACTITIONER

## 2023-07-31 PROCEDURE — 84439 ASSAY OF FREE THYROXINE: CPT

## 2023-08-01 ENCOUNTER — OFFICE VISIT (OUTPATIENT)
Dept: ENDOCRINOLOGY | Facility: MEDICAL CENTER | Age: 33
End: 2023-08-01
Attending: INTERNAL MEDICINE
Payer: COMMERCIAL

## 2023-08-01 VITALS
SYSTOLIC BLOOD PRESSURE: 102 MMHG | BODY MASS INDEX: 21.96 KG/M2 | RESPIRATION RATE: 20 BRPM | HEIGHT: 65 IN | DIASTOLIC BLOOD PRESSURE: 62 MMHG | WEIGHT: 131.8 LBS | OXYGEN SATURATION: 96 % | HEART RATE: 71 BPM

## 2023-08-01 DIAGNOSIS — E55.9 VITAMIN D DEFICIENCY: ICD-10-CM

## 2023-08-01 DIAGNOSIS — E03.9 ACQUIRED HYPOTHYROIDISM: ICD-10-CM

## 2023-08-01 DIAGNOSIS — Z79.899 HIGH RISK MEDICATION USE: ICD-10-CM

## 2023-08-01 DIAGNOSIS — E23.2 DIABETES INSIPIDUS (HCC): ICD-10-CM

## 2023-08-01 PROCEDURE — 99214 OFFICE O/P EST MOD 30 MIN: CPT | Performed by: INTERNAL MEDICINE

## 2023-08-01 PROCEDURE — 3074F SYST BP LT 130 MM HG: CPT | Performed by: INTERNAL MEDICINE

## 2023-08-01 PROCEDURE — 99211 OFF/OP EST MAY X REQ PHY/QHP: CPT | Performed by: INTERNAL MEDICINE

## 2023-08-01 PROCEDURE — 3078F DIAST BP <80 MM HG: CPT | Performed by: INTERNAL MEDICINE

## 2023-08-01 RX ORDER — LEVOTHYROXINE SODIUM 75 MCG
TABLET ORAL
Qty: 94 TABLET | Refills: 3 | Status: SHIPPED | OUTPATIENT
Start: 2023-08-01

## 2023-08-01 RX ORDER — DESMOPRESSIN ACETATE 0.1 MG/1
0.1 TABLET ORAL DAILY
Qty: 90 TABLET | Refills: 3 | Status: SHIPPED | OUTPATIENT
Start: 2023-08-01

## 2023-08-01 RX ORDER — ERGOCALCIFEROL 1.25 MG/1
50000 CAPSULE ORAL
Qty: 12 CAPSULE | Refills: 3 | Status: SHIPPED | OUTPATIENT
Start: 2023-08-01

## 2023-08-01 ASSESSMENT — FIBROSIS 4 INDEX: FIB4 SCORE: 0.57
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
## 2023-08-01 NOTE — ASSESSMENT & PLAN NOTE
Chronic. Failed sertraline due to worsening insomnia as well as significantly worsening agoraphobia.     Failed effexor due to significant fatigue and sexual side effects, more difficult to orgasm, visual changes    Started on lexapro 10 mg daily. Vision has improved. Has noticed increase in appetite. Has had some improvement in mood/motivation, unsure if this is medication or improvement in weather. Hasn't noticed significant improvement in anxiety/agoraphobia.     Increased dose to 20 mg which hasn't improved other symptoms and sex drive worsened. She has not yet taken the wellbutrin yet.     Doing better on lexapro 10 mg daily. Doesn't want to start Wellbutrin yet, is going to be moving at the end of the month.

## 2023-08-09 DIAGNOSIS — K29.00 ACUTE GASTRITIS WITHOUT HEMORRHAGE, UNSPECIFIED GASTRITIS TYPE: ICD-10-CM

## 2023-08-09 RX ORDER — OMEPRAZOLE 20 MG/1
20 CAPSULE, DELAYED RELEASE ORAL DAILY
Qty: 90 CAPSULE | Refills: 1 | Status: SHIPPED | OUTPATIENT
Start: 2023-08-09

## 2023-08-09 NOTE — TELEPHONE ENCOUNTER
Received request via: Pharmacy    Was the patient seen in the last year in this department? Yes    Does the patient have an active prescription (recently filled or refills available) for medication(s) requested? yes    Does the patient have [facility] and need 100 day supply (blood pressure, diabetes and cholesterol meds only)? Patient does not have SCP   Requested Prescriptions     Pending Prescriptions Disp Refills    omeprazole (PRILOSEC) 20 MG delayed-release capsule [Pharmacy Med Name: OMEPRAZOLE DR 20 MG CAPSULE] 90 Capsule 1     Sig: TAKE 1 CAPSULE BY MOUTH EVERY DAY

## 2023-08-21 ENCOUNTER — OFFICE VISIT (OUTPATIENT)
Dept: MEDICAL GROUP | Facility: MEDICAL CENTER | Age: 33
End: 2023-08-21
Payer: COMMERCIAL

## 2023-08-21 VITALS
SYSTOLIC BLOOD PRESSURE: 98 MMHG | DIASTOLIC BLOOD PRESSURE: 64 MMHG | OXYGEN SATURATION: 96 % | HEART RATE: 64 BPM | TEMPERATURE: 98.6 F | WEIGHT: 132.8 LBS | BODY MASS INDEX: 22.13 KG/M2 | HEIGHT: 65 IN

## 2023-08-21 DIAGNOSIS — F33.1 MODERATE EPISODE OF RECURRENT MAJOR DEPRESSIVE DISORDER (HCC): ICD-10-CM

## 2023-08-21 PROCEDURE — 3078F DIAST BP <80 MM HG: CPT | Performed by: NURSE PRACTITIONER

## 2023-08-21 PROCEDURE — 99214 OFFICE O/P EST MOD 30 MIN: CPT | Performed by: NURSE PRACTITIONER

## 2023-08-21 PROCEDURE — 3074F SYST BP LT 130 MM HG: CPT | Performed by: NURSE PRACTITIONER

## 2023-08-21 ASSESSMENT — FIBROSIS 4 INDEX: FIB4 SCORE: 0.57

## 2023-08-21 NOTE — PROGRESS NOTES
Subjective:   Mary Carmen Head is a 33 y.o. female here today for ***    Moderate episode of recurrent major depressive disorder (HCC)  Chronic. Failed sertraline due to worsening insomnia as well as significantly worsening agoraphobia.     Failed effexor due to significant fatigue and sexual side effects, more difficult to orgasm, visual changes    Started on lexapro 10 mg daily. Vision has improved. Has noticed increase in appetite. Has had some improvement in mood/motivation, unsure if this is medication or improvement in weather. Hasn't noticed significant improvement in anxiety/agoraphobia.     Increased dose to 20 mg which hasn't improved other symptoms and sex drive worsened. She has not yet taken the wellbutrin yet.     Doing better on lexapro 10 mg daily. Doesn't want to start Wellbutrin yet, is going to be moving at the end of the month.    ***    Current medicines (including changes today)  Current Outpatient Medications   Medication Sig Dispense Refill    omeprazole (PRILOSEC) 20 MG delayed-release capsule TAKE 1 CAPSULE BY MOUTH EVERY DAY 90 Capsule 1    vitamin D2, Ergocalciferol, (DRISDOL) 1.25 MG (06285 UT) Cap capsule Take 1 Capsule by mouth every 14 days. 12 Capsule 3    desmopressin (DDAVP) 0.1 MG Tab Take 1 Tablet by mouth every day. 90 Tablet 3    SYNTHROID 75 MCG Tab Take 1 tablet daily plus an extra one a day a week 94 Tablet 3    escitalopram (LEXAPRO) 10 MG Tab Take 1 Tablet by mouth every day. 90 Tablet 3    LORYNA 3-0.02 MG per tablet TAKE 1 TABLET BY MOUTH EVERY DAY 84 Tablet 3    buPROPion (WELLBUTRIN XL) 150 MG XL tablet Take 1 Tablet by mouth every morning. 90 Tablet 3    Ferrous Sulfate (IRON PO)       Magnesium 500 MG Cap       ascorbic acid (ASCORBIC ACID) 500 MG Tab Take 1 Tablet by mouth every day.      coenzyme Q-10 30 MG capsule Take 2 Capsules by mouth every day.      Probiotic Product (PROBIOTIC PO) Take 1 Cap by mouth every day.       No current  facility-administered medications for this visit.     She  has a past medical history of Breath shortness (11/03/2021), Diabetes insipidus (HCC), Heart burn, History of IBS, Hypothyroidism, Indigestion, PMDD (premenstrual dysphoric disorder), and Vitamin D deficiency (12/12/2018).    ROS ***  No chest pain, no shortness of breath, no abdominal pain  Positive ROS as per HPI.  All other systems reviewed and are negative.     Objective:     There were no vitals taken for this visit. There is no height or weight on file to calculate BMI. ***  Physical Exam:  Constitutional: Alert, no distress.  Skin: Warm, dry, good turgor, no rashes in visible areas.  Eye: Equal, round and reactive, conjunctiva clear, lids normal.  ENMT: Lips without lesions, good dentition, oropharynx clear.  Neck: Trachea midline, no masses, no thyromegaly. No cervical or supraclavicular lymphadenopathy  Respiratory: Unlabored respiratory effort, lungs clear to auscultation, no wheezes, no ronchi.  Cardiovascular: Normal S1, S2, no murmur, no edema.  Abdomen: Soft, non-tender, no masses, no hepatosplenomegaly.  Psych: Alert and oriented x3, normal affect and mood.        Assessment and Plan:   The following treatment plan was discussed    1. Moderate episode of recurrent major depressive disorder (HCC)  ***      Followup: No follow-ups on file.    The MA is performing the above orders under the direction of Dr. Mack    Please note that this dictation was created using voice recognition software. I have made every reasonable attempt to correct obvious errors, but I expect that there are errors of grammar and possibly content that I did not discover before finalizing the note.
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
## 2023-09-26 NOTE — PROGRESS NOTES
Subjective:   Mary Carmen Head is a 33 y.o. female here today for depression follow up    Moderate episode of recurrent major depressive disorder (HCC)  Chronic. Failed sertraline due to worsening insomnia as well as significantly worsening agoraphobia.     Failed effexor due to significant fatigue and sexual side effects, more difficult to orgasm, visual changes    Started on lexapro 10 mg daily. Vision has improved. Has noticed increase in appetite. Has had some improvement in mood/motivation, unsure if this is medication or improvement in weather. Hasn't noticed significant improvement in anxiety/agoraphobia.     Increased dose to 20 mg which hasn't improved other symptoms and sex drive worsened. She has not yet taken the wellbutrin yet.     Doing better on lexapro 10 mg daily. Doesn't want to start Wellbutrin yet, is going to be moving at the end of the month.      Current medicines (including changes today)  Current Outpatient Medications   Medication Sig Dispense Refill    omeprazole (PRILOSEC) 20 MG delayed-release capsule TAKE 1 CAPSULE BY MOUTH EVERY DAY 90 Capsule 1    vitamin D2, Ergocalciferol, (DRISDOL) 1.25 MG (47967 UT) Cap capsule Take 1 Capsule by mouth every 14 days. 12 Capsule 3    desmopressin (DDAVP) 0.1 MG Tab Take 1 Tablet by mouth every day. 90 Tablet 3    SYNTHROID 75 MCG Tab Take 1 tablet daily plus an extra one a day a week 94 Tablet 3    escitalopram (LEXAPRO) 10 MG Tab Take 1 Tablet by mouth every day. 90 Tablet 3    LORYNA 3-0.02 MG per tablet TAKE 1 TABLET BY MOUTH EVERY DAY 84 Tablet 3    buPROPion (WELLBUTRIN XL) 150 MG XL tablet Take 1 Tablet by mouth every morning. 90 Tablet 3    Ferrous Sulfate (IRON PO)       Magnesium 500 MG Cap       ascorbic acid (ASCORBIC ACID) 500 MG Tab Take 1 Tablet by mouth every day.      coenzyme Q-10 30 MG capsule Take 2 Capsules by mouth every day.      Probiotic Product (PROBIOTIC PO) Take 1 Cap by mouth every day.       No current  "facility-administered medications for this visit.     She  has a past medical history of Breath shortness (11/03/2021), Diabetes insipidus (HCC), Heart burn, History of IBS, Hypothyroidism, Indigestion, PMDD (premenstrual dysphoric disorder), and Vitamin D deficiency (12/12/2018).    ROS   No chest pain, no shortness of breath, no abdominal pain  Positive ROS as per HPI.  All other systems reviewed and are negative.     Objective:     BP 98/64   Pulse 64   Temp 37 °C (98.6 °F) (Temporal)   Ht 1.651 m (5' 5\")   Wt 60.2 kg (132 lb 12.8 oz)   SpO2 96%  Body mass index is 22.1 kg/m².     Physical Exam:  Constitutional: Alert, no distress.  Skin: Warm, dry, good turgor, no rashes in visible areas.  Eye: Equal, round and reactive, conjunctiva clear, lids normal.  ENMT: Lips without lesions, good dentition  Respiratory: Unlabored respiratory effort  Psych: Alert and oriented x3, normal affect and mood.        Assessment and Plan:   The following treatment plan was discussed    1. Moderate episode of recurrent major depressive disorder (HCC)  Unstable, improving  Continue lexapro 10 mg daily  Not starting wellbutrin until she moves and is established with new PCP to be able to follow up on this      Followup: Return for Care As Needed.    The MA is performing the above orders under the direction of Dr. Mack    Please note that this dictation was created using voice recognition software. I have made every reasonable attempt to correct obvious errors, but I expect that there are errors of grammar and possibly content that I did not discover before finalizing the note.               "

## 2024-02-22 NOTE — TELEPHONE ENCOUNTER
Continue with nicotine patch  Encourage cessation   Referral for ophthalmology placed per patient request.     ADEBAYO David.

## 2024-07-14 DIAGNOSIS — F33.1 MODERATE EPISODE OF RECURRENT MAJOR DEPRESSIVE DISORDER (HCC): ICD-10-CM

## 2024-07-14 DIAGNOSIS — F41.9 ANXIETY: ICD-10-CM

## 2024-07-17 RX ORDER — ESCITALOPRAM OXALATE 10 MG/1
10 TABLET ORAL DAILY
Qty: 30 TABLET | Refills: 1 | Status: SHIPPED | OUTPATIENT
Start: 2024-07-17

## 2024-07-24 DIAGNOSIS — E03.9 ACQUIRED HYPOTHYROIDISM: ICD-10-CM

## 2024-07-25 RX ORDER — LEVOTHYROXINE SODIUM 0.07 MG/1
TABLET ORAL
Qty: 94 TABLET | Refills: 0 | Status: SHIPPED | OUTPATIENT
Start: 2024-07-25

## 2024-09-22 DIAGNOSIS — F41.9 ANXIETY: ICD-10-CM

## 2024-09-22 DIAGNOSIS — F33.1 MODERATE EPISODE OF RECURRENT MAJOR DEPRESSIVE DISORDER (HCC): ICD-10-CM

## 2024-09-24 RX ORDER — ESCITALOPRAM OXALATE 10 MG/1
10 TABLET ORAL DAILY
Qty: 30 TABLET | Refills: 1 | OUTPATIENT
Start: 2024-09-24

## 2024-09-24 NOTE — TELEPHONE ENCOUNTER
Received request via: Pharmacy    Was the patient seen in the last year in this department? No    Does the patient have an active prescription (recently filled or refills available) for medication(s) requested? No    Pharmacy Name: [facility]    Does the patient have assisted Plus and need 100-day supply? (This applies to ALL medications) Patient does not have SCP

## (undated) DEVICE — TUBING CLEARLINK DUO-VENT - C-FLO (48EA/CA)

## (undated) DEVICE — DRESSING SURGICAL NUKNIT 6X9 (10EA/BX)

## (undated) DEVICE — SUTURE 0 VICRYL PLUS UR-6 - 27 INCH (36/BX)

## (undated) DEVICE — CANISTER SUCTION 3000ML MECHANICAL FILTER AUTO SHUTOFF MEDI-VAC NONSTERILE LF DISP  (40EA/CA)

## (undated) DEVICE — MASK ANESTHESIA ADULT  - (100/CA)

## (undated) DEVICE — GLOVE BIOGEL PI INDICATOR SZ 7.5 SURGICAL PF LF -(50/BX 4BX/CA)

## (undated) DEVICE — PACK LAP CHOLE OR - (2EA/CA)

## (undated) DEVICE — SODIUM CHL IRRIGATION 0.9% 1000ML (12EA/CA)

## (undated) DEVICE — STERI STRIP COMPOUND BENZOIN - TINCTURE 0.6ML WITH APPLICATOR (40EA/BX)

## (undated) DEVICE — TROCAR Z THREAD12MM OPTICAL - NON BLADED (6/BX)

## (undated) DEVICE — SCISSORS 5MM CVD (6EA/BX)

## (undated) DEVICE — CLIP APPLIER 10MM ENDO LARGE (3EA/BX)

## (undated) DEVICE — CHLORAPREP 26 ML APPLICATOR - ORANGE TINT(25/CA)

## (undated) DEVICE — SUCTION INSTRUMENT YANKAUER BULBOUS TIP W/O VENT (50EA/CA)

## (undated) DEVICE — ELECTRODE DUAL RETURN W/ CORD - (50/PK)

## (undated) DEVICE — KIT ANESTHESIA W/CIRCUIT & 3/LT BAG W/FILTER (20EA/CA)

## (undated) DEVICE — BANDAID SHEER STRIP 3/4 IN (100EA/BX 12BX/CA)

## (undated) DEVICE — SYRINGE DISP. 50CC LS - (40/BX)

## (undated) DEVICE — NEPTUNE 4 PORT MANIFOLD - (20/PK)

## (undated) DEVICE — GOWN WARMING STANDARD FLEX - (30/CA)

## (undated) DEVICE — TOWEL STOP TIMEOUT SAFETY FLAG (40EA/CA)

## (undated) DEVICE — SUTURE GENERAL

## (undated) DEVICE — ELECTRODE 850 FOAM ADHESIVE - HYDROGEL RADIOTRNSPRNT (50/PK)

## (undated) DEVICE — SET TUBING PNEUMOCLEAR HIGH FLOW SMOKE EVACUATION (10EA/BX)

## (undated) DEVICE — CANNULA W/SEAL 5X100 Z-THRE - ADED KII (12/BX)

## (undated) DEVICE — GLOVE SZ 7 BIOGEL PI MICRO - PF LF (50PR/BX 4BX/CA)

## (undated) DEVICE — SENSOR SPO2 NEO LNCS ADHESIVE (20/BX) SEE USER NOTES

## (undated) DEVICE — SUTURE 4-0 VICRYL PLUSFS-1 - 27 INCH (36/BX)

## (undated) DEVICE — SYRINGE DISP. 60 CC LL - (30/BX, 12BX/CA)**WHEN THESE ARE GONE ORDER #500206**

## (undated) DEVICE — LACTATED RINGERS INJ 1000 ML - (14EA/CA 60CA/PF)

## (undated) DEVICE — SET EXTENSION WITH 2 PORTS (48EA/CA) ***PART #2C8610 IS A SUBSTITUTE*****

## (undated) DEVICE — SLEEVE, VASO, THIGH, MED

## (undated) DEVICE — BAG RETRIEVAL 10ML (10EA/BX)

## (undated) DEVICE — CLOSURE SKIN STRIP 1/2 X 4 IN - (STERI STRIP) (50/BX 4BX/CA)

## (undated) DEVICE — TROCAR 5X100 NON BLADED Z-TH - READ KII (6/BX)

## (undated) DEVICE — SET SUCTION/IRRIGATION WITH DISPOSABLE TIP (6/CA )PART #0250-070-520 IS A SUB

## (undated) DEVICE — PROTECTOR ULNA NERVE - (36PR/CA)

## (undated) DEVICE — HEAD HOLDER JUNIOR/ADULT

## (undated) DEVICE — DRAPESURG STERI-DRAPE LONG - (10/BX 4BX/CA)

## (undated) DEVICE — SET LEADWIRE 5 LEAD BEDSIDE DISPOSABLE ECG (1SET OF 5/EA)

## (undated) DEVICE — TUBE CONNECT SUCTION CLEAR 120 X 1/4" (50EA/CA)"